# Patient Record
Sex: MALE | Race: BLACK OR AFRICAN AMERICAN | NOT HISPANIC OR LATINO | Employment: FULL TIME | URBAN - METROPOLITAN AREA
[De-identification: names, ages, dates, MRNs, and addresses within clinical notes are randomized per-mention and may not be internally consistent; named-entity substitution may affect disease eponyms.]

---

## 2017-01-31 DIAGNOSIS — G35 MS (MULTIPLE SCLEROSIS): ICD-10-CM

## 2017-02-02 RX ORDER — DIMETHYL FUMARATE 240 MG/1
CAPSULE ORAL
Qty: 60 CAPSULE | Refills: 1 | Status: CANCELLED | OUTPATIENT
Start: 2017-02-02

## 2017-02-04 ENCOUNTER — LAB VISIT (OUTPATIENT)
Dept: LAB | Facility: HOSPITAL | Age: 47
End: 2017-02-04
Attending: PSYCHIATRY & NEUROLOGY
Payer: COMMERCIAL

## 2017-02-04 DIAGNOSIS — G35 MULTIPLE SCLEROSIS: ICD-10-CM

## 2017-02-04 LAB
BASOPHILS # BLD AUTO: 0.03 K/UL
BASOPHILS NFR BLD: 1.2 %
DIFFERENTIAL METHOD: ABNORMAL
EOSINOPHIL # BLD AUTO: 0.2 K/UL
EOSINOPHIL NFR BLD: 5.8 %
ERYTHROCYTE [DISTWIDTH] IN BLOOD BY AUTOMATED COUNT: 12.5 %
HCT VFR BLD AUTO: 44.3 %
HGB BLD-MCNC: 14.6 G/DL
LYMPHOCYTES # BLD AUTO: 0.9 K/UL
LYMPHOCYTES NFR BLD: 33.6 %
MCH RBC QN AUTO: 29 PG
MCHC RBC AUTO-ENTMCNC: 33 %
MCV RBC AUTO: 88 FL
MONOCYTES # BLD AUTO: 0.4 K/UL
MONOCYTES NFR BLD: 15.1 %
NEUTROPHILS # BLD AUTO: 1.1 K/UL
NEUTROPHILS NFR BLD: 43.9 %
PLATELET # BLD AUTO: 265 K/UL
PMV BLD AUTO: 9.3 FL
RBC # BLD AUTO: 5.03 M/UL
WBC # BLD AUTO: 2.59 K/UL

## 2017-02-04 PROCEDURE — 36415 COLL VENOUS BLD VENIPUNCTURE: CPT

## 2017-02-04 PROCEDURE — 86360 T CELL ABSOLUTE COUNT/RATIO: CPT

## 2017-02-04 PROCEDURE — 86359 T CELLS TOTAL COUNT: CPT

## 2017-02-04 PROCEDURE — 85025 COMPLETE CBC W/AUTO DIFF WBC: CPT

## 2017-02-06 LAB
ABSOLUTE CD3: 501 CELLS/UL (ref 700–2100)
ABSOLUTE CD8: 129 CELLS/UL (ref 200–900)
CD3%: 56.6 % (ref 55–83)
CD3+CD4+ CELLS # BLD: 369 CELLS/UL (ref 300–1400)
CD3+CD4+ CELLS NFR BLD: 41.7 % (ref 28–57)
CD4/CD8 RATIO: 2.87 (ref 0.9–3.6)
CD8 % SUPPRESSOR T CELL: 14.6 % (ref 10–39)

## 2017-02-07 DIAGNOSIS — G35 MS (MULTIPLE SCLEROSIS): ICD-10-CM

## 2017-02-07 RX ORDER — DIMETHYL FUMARATE 240 MG/1
240 CAPSULE ORAL 2 TIMES DAILY
Qty: 180 CAPSULE | Refills: 0 | Status: SHIPPED | OUTPATIENT
Start: 2017-02-07 | End: 2017-05-02 | Stop reason: SDUPTHER

## 2017-02-07 NOTE — TELEPHONE ENCOUNTER
----- Message from Radha Silveira PA-C sent at 2/7/2017  8:49 AM CST -----  Tecfidera as DMT  CD8-129--was 120, 142  ALC-870--was 840, 790  Stable--continue to monitor q3

## 2017-02-10 DIAGNOSIS — G35 MS (MULTIPLE SCLEROSIS): ICD-10-CM

## 2017-02-10 RX ORDER — DIMETHYL FUMARATE 240 MG/1
CAPSULE ORAL
Qty: 60 CAPSULE | Refills: 1 | OUTPATIENT
Start: 2017-02-10

## 2017-03-03 ENCOUNTER — OFFICE VISIT (OUTPATIENT)
Dept: NEUROLOGY | Facility: CLINIC | Age: 47
End: 2017-03-03
Payer: COMMERCIAL

## 2017-03-03 VITALS
SYSTOLIC BLOOD PRESSURE: 113 MMHG | DIASTOLIC BLOOD PRESSURE: 74 MMHG | HEART RATE: 79 BPM | HEIGHT: 69 IN | WEIGHT: 150 LBS | BODY MASS INDEX: 22.22 KG/M2

## 2017-03-03 DIAGNOSIS — G35 MULTIPLE SCLEROSIS: Primary | ICD-10-CM

## 2017-03-03 DIAGNOSIS — R68.89 HEAT SENSITIVITY: ICD-10-CM

## 2017-03-03 DIAGNOSIS — R53.83 FATIGUE, UNSPECIFIED TYPE: ICD-10-CM

## 2017-03-03 DIAGNOSIS — Z71.89 COUNSELING REGARDING GOALS OF CARE: ICD-10-CM

## 2017-03-03 DIAGNOSIS — Z79.899 ENCOUNTER FOR LONG-TERM (CURRENT) USE OF HIGH-RISK MEDICATION: ICD-10-CM

## 2017-03-03 PROCEDURE — 1160F RVW MEDS BY RX/DR IN RCRD: CPT | Mod: S$GLB,,, | Performed by: PHYSICIAN ASSISTANT

## 2017-03-03 PROCEDURE — 99214 OFFICE O/P EST MOD 30 MIN: CPT | Mod: S$GLB,,, | Performed by: PHYSICIAN ASSISTANT

## 2017-03-03 PROCEDURE — 99999 PR PBB SHADOW E&M-EST. PATIENT-LVL III: CPT | Mod: PBBFAC,,, | Performed by: PHYSICIAN ASSISTANT

## 2017-03-03 NOTE — PROGRESS NOTES
Subjective:       Patient ID: Helio Nolasco is a 46 y.o. male who presents today for a routine clinic visit for MS.    MS HPI:  · DMT: Tecfidera  · Side effects from DMT? No  · Taking vitamin D3 as recommended? Yes -  Dose: 5,000 + IU Mon-Thu  · Feels great    SOCIAL HISTORY  Social History   Substance Use Topics    Smoking status: Current Some Day Smoker     Types: Cigars    Smokeless tobacco: None      Comment: smokes a cigar once a week    Alcohol use 0.0 oz/week     0 Standard drinks or equivalent per week      Comment: socially     Living arrangements - the patient lives with his family.  Employment [employer]--working well at night    MS ROS:  · Fatigue: takes B-12 and feels well  · Sleep Disturbance: No  · Bladder Dysfunction: No  · Bowel Dysfunction: No  · Spasticity: No--tends to get them more in August with the heat  · Visual Symptoms: Yes - just got new glasses  · Cognitive: No  · Mood Disorder: Yes - stable with Lexapro  · Gait Disturbance: No  · Falls: No  · Hand Dysfunction: No  · Pain: No  · Sexual Dysfunction: No  · Skin Breakdown: No  · Tremors: No  · Dysphagia:  No  · Dysarthria:  No  · Heat sensitivity:  Yes - as above  · Any un-met adaptive needs? No  · Copay Assist?  No--0$  · Clinical Trial candidate? No        Objective:        1. 25 foot timed walk:  Timed 25 Foot Walk: 9/7/2016 3/3/2017   Did patient wear an AFO? No No   Was assistive device used? No No   Time for 25 Foot Walk (seconds) 3.5 3.5   Time for 25 Foot Walk (seconds) 3.5 3.2       Neurologic Exam     Mental Status   Oriented to person, place, and time.   Follows 3 step commands.   Speech: speech is normal   Level of consciousness: alert  Normal comprehension.     Cranial Nerves     CN II   Visual acuity: normal with correction (20/20 OD; 20/25 OS with Snellen hand held chart at 6 ft)    CN III, IV, VI   Pupils are equal, round, and reactive to light.  Extraocular motions are normal.     CN V   Facial  sensation intact.     CN VII   Facial expression full, symmetric.     CN VIII   Hearing: intact (finger rub)    CN IX, X   Palate: symmetric    CN XI   CN XI normal.     CN XII   Tongue deviation: none    Motor Exam   Muscle bulk: normal  Overall muscle tone: normal    Strength   Right deltoid: 5/5  Left deltoid: 5/5  Right triceps: 5/5  Left triceps: 5/5  Right wrist extension: 5/5  Left wrist extension: 5/5  Right interossei: 5/5  Left interossei: 5/5  Right iliopsoas: 5/5  Left iliopsoas: 5/5  Right hamstrin/5  Left hamstrin/5  Right anterior tibial: 5/5  Left anterior tibial: 5/5  Right peroneal: 5/5  Left peroneal: 5/5    Sensory Exam   Light touch normal.   Right arm vibration: decreased from fingers  Left arm vibration: decreased from fingers  Right leg vibration: decreased from toes  Left leg vibration: decreased from toes    Gait, Coordination, and Reflexes     Gait  Gait: normal    Coordination   Finger to nose coordination: normal  Tandem walking coordination: normal    Tremor   Resting tremor: absent  Action tremor: absent    Reflexes   Right brachioradialis: 3+  Left brachioradialis: 3+  Right biceps: 3+  Left biceps: 3+  Right triceps: 3+  Left triceps: 3+  Right patellar: 3+  Left patellar: 3+  Right achilles: 3+  Left achilles: 3+  Left : 3+  Right plantar: normal  Left plantar: normal  Right ankle clonus: absent  Left ankle clonus: absent  Right pendular knee jerk: absent  Left pendular knee jerk: absent       Normal Heel/toe walk  Normal RSM         Imaging:   MRI unable to be done secondary to financial constraits    Labs:     Lab Results   Component Value Date    RKNAYBLR52CZ 82 11/15/2016    OMZRISKC46DB >96 (A) 2013    SUEMTFTO78FP 96 2013     No results found for: JCVINDEX, JCVANTIBODY  Lab Results   Component Value Date    GI7BWWDL 56.6 2017    ABSOLUTECD3 501 (L) 2017    QV0ZVUFV 14.6 2017    ABSOLUTECD8 129 (L) 2017    VZ7SYSCM 41.7 2017     ABSOLUTECD4 369 02/04/2017    LABCD48 2.87 02/04/2017     Lab Results   Component Value Date    WBC 2.59 (L) 02/04/2017    RBC 5.03 02/04/2017    HGB 14.6 02/04/2017    HCT 44.3 02/04/2017    MCV 88 02/04/2017    MCH 29.0 02/04/2017    MCHC 33.0 02/04/2017    RDW 12.5 02/04/2017     02/04/2017    MPV 9.3 02/04/2017    GRAN 1.1 (L) 02/04/2017    GRAN 43.9 02/04/2017    LYMPH 0.9 (L) 02/04/2017    LYMPH 33.6 02/04/2017    MONO 0.4 02/04/2017    MONO 15.1 (H) 02/04/2017    EOS 0.2 02/04/2017    BASO 0.03 02/04/2017    EOSINOPHIL 5.8 02/04/2017    BASOPHIL 1.2 02/04/2017     ALC-870  Diagnosis/Assessment/Plan:    1. Multiple Sclerosis  · Assessment:Patient is clinically stable on Tecfidera.   · Imaging:He has been unable to get MRI's done secondary to financial issues. He did not qualify for assistance through [employer]. I have advised him to seek assistance through [facility] Patient assistance fund---he states he will do this  · Disease Modifying Therapies: Continue Tecfidera and high dose Vit D(4 days a week). His labs show mild lymphopenia(Grade 1); however, CD8 count is stable. We will continue to monitor approximately every 3 months.     2. MS Symptom Assessment / Management  · Fatigue: Continue B-12 supplementation(5d/wk)-will check level with next Tecfidera labs  ·   Over 50% of this 30 minute visit was spent in direct face to face counseling of the patient regarding his current symptoms and management of same.  Follow up in 6 months with Dr. Junior  Patient agreed to POC today.    Attending, Dr. Junior, was available during today's encounter. Any change to plan along with cosign to appear in the EMR.     Radha Silveira PA-C  [facility]      Multiple sclerosis    Encounter for long-term (current) use of high-risk medication    Counseling regarding goals of care    Heat sensitivity    Fatigue, unspecified type

## 2017-03-07 ENCOUNTER — TELEPHONE (OUTPATIENT)
Dept: NEUROLOGY | Facility: CLINIC | Age: 47
End: 2017-03-07

## 2017-03-07 DIAGNOSIS — G35 MULTIPLE SCLEROSIS: Primary | ICD-10-CM

## 2017-03-07 NOTE — TELEPHONE ENCOUNTER
----- Message from Radha Silveira PA-C sent at 3/3/2017  2:34 PM CST -----  Lets continue to check labs every 3 months---next time please add B-12 to his labs  Thanks

## 2017-05-01 ENCOUNTER — LAB VISIT (OUTPATIENT)
Dept: LAB | Facility: HOSPITAL | Age: 47
End: 2017-05-01
Attending: PSYCHIATRY & NEUROLOGY
Payer: COMMERCIAL

## 2017-05-01 DIAGNOSIS — G35 MULTIPLE SCLEROSIS: ICD-10-CM

## 2017-05-01 LAB
ABSOLUTE CD3: 438 CELLS/UL (ref 700–2100)
ABSOLUTE CD8: 101 CELLS/UL (ref 200–900)
BASOPHILS # BLD AUTO: 0.02 K/UL
BASOPHILS NFR BLD: 0.9 %
CD3%: 55.7 % (ref 55–83)
CD3+CD4+ CELLS # BLD: 336 CELLS/UL (ref 300–1400)
CD3+CD4+ CELLS NFR BLD: 42.7 % (ref 28–57)
CD4/CD8 RATIO: 3.34 (ref 0.9–3.6)
CD8 % SUPPRESSOR T CELL: 12.8 % (ref 10–39)
DIFFERENTIAL METHOD: ABNORMAL
EOSINOPHIL # BLD AUTO: 0.1 K/UL
EOSINOPHIL NFR BLD: 2.6 %
ERYTHROCYTE [DISTWIDTH] IN BLOOD BY AUTOMATED COUNT: 12.2 %
HCT VFR BLD AUTO: 40.4 %
HGB BLD-MCNC: 13.9 G/DL
LYMPHOCYTES # BLD AUTO: 0.8 K/UL
LYMPHOCYTES NFR BLD: 32.6 %
MCH RBC QN AUTO: 29.8 PG
MCHC RBC AUTO-ENTMCNC: 34.4 %
MCV RBC AUTO: 87 FL
MONOCYTES # BLD AUTO: 0.2 K/UL
MONOCYTES NFR BLD: 10.4 %
NEUTROPHILS # BLD AUTO: 1.2 K/UL
NEUTROPHILS NFR BLD: 53.5 %
PLATELET # BLD AUTO: 208 K/UL
PMV BLD AUTO: 9.3 FL
RBC # BLD AUTO: 4.67 M/UL
VIT B12 SERPL-MCNC: 1653 PG/ML
WBC # BLD AUTO: 2.3 K/UL

## 2017-05-01 PROCEDURE — 85025 COMPLETE CBC W/AUTO DIFF WBC: CPT

## 2017-05-01 PROCEDURE — 86360 T CELL ABSOLUTE COUNT/RATIO: CPT

## 2017-05-01 PROCEDURE — 82607 VITAMIN B-12: CPT

## 2017-05-01 PROCEDURE — 86359 T CELLS TOTAL COUNT: CPT

## 2017-05-01 PROCEDURE — 36415 COLL VENOUS BLD VENIPUNCTURE: CPT

## 2017-05-02 DIAGNOSIS — G35 MS (MULTIPLE SCLEROSIS): ICD-10-CM

## 2017-05-03 ENCOUNTER — DOCUMENTATION ONLY (OUTPATIENT)
Dept: NEUROLOGY | Facility: CLINIC | Age: 47
End: 2017-05-03

## 2017-05-04 ENCOUNTER — TELEPHONE (OUTPATIENT)
Dept: NEUROLOGY | Facility: CLINIC | Age: 47
End: 2017-05-04

## 2017-05-04 DIAGNOSIS — G35 MULTIPLE SCLEROSIS: Primary | ICD-10-CM

## 2017-05-04 RX ORDER — DIMETHYL FUMARATE 240 MG/1
CAPSULE ORAL
Qty: 60 CAPSULE | Refills: 1 | Status: SHIPPED | OUTPATIENT
Start: 2017-05-04 | End: 2017-05-12 | Stop reason: SDUPTHER

## 2017-05-04 NOTE — TELEPHONE ENCOUNTER
----- Message from Radha Silveira PA-C sent at 5/4/2017 10:19 AM CDT -----  ALC-749  CD8-101  Would like to increase safety labs to monthly for the next 3 months

## 2017-05-04 NOTE — TELEPHONE ENCOUNTER
Pt aware of lab results. Informed patient we need monthly labs. Labs scheduled on 6/3, 7/1 and 7/29. [facility].

## 2017-05-11 ENCOUNTER — DOCUMENTATION ONLY (OUTPATIENT)
Dept: NEUROLOGY | Facility: CLINIC | Age: 47
End: 2017-05-11

## 2017-05-12 DIAGNOSIS — G35 MS (MULTIPLE SCLEROSIS): ICD-10-CM

## 2017-05-12 RX ORDER — DIMETHYL FUMARATE 240 MG/1
CAPSULE ORAL
Qty: 60 CAPSULE | Refills: 2 | Status: SHIPPED | OUTPATIENT
Start: 2017-05-12 | End: 2017-06-09 | Stop reason: SDUPTHER

## 2017-06-03 ENCOUNTER — LAB VISIT (OUTPATIENT)
Dept: LAB | Facility: HOSPITAL | Age: 47
End: 2017-06-03
Attending: PSYCHIATRY & NEUROLOGY
Payer: COMMERCIAL

## 2017-06-03 DIAGNOSIS — G35 MULTIPLE SCLEROSIS: ICD-10-CM

## 2017-06-03 LAB
BASOPHILS # BLD AUTO: 0.03 K/UL
BASOPHILS NFR BLD: 1.1 %
DIFFERENTIAL METHOD: ABNORMAL
EOSINOPHIL # BLD AUTO: 0.1 K/UL
EOSINOPHIL NFR BLD: 4 %
ERYTHROCYTE [DISTWIDTH] IN BLOOD BY AUTOMATED COUNT: 12.7 %
HCT VFR BLD AUTO: 44.7 %
HGB BLD-MCNC: 14.7 G/DL
LYMPHOCYTES # BLD AUTO: 0.9 K/UL
LYMPHOCYTES NFR BLD: 33.9 %
MCH RBC QN AUTO: 29.6 PG
MCHC RBC AUTO-ENTMCNC: 32.9 %
MCV RBC AUTO: 90 FL
MONOCYTES # BLD AUTO: 0.3 K/UL
MONOCYTES NFR BLD: 9.5 %
NEUTROPHILS # BLD AUTO: 1.4 K/UL
NEUTROPHILS NFR BLD: 51.5 %
PLATELET # BLD AUTO: 236 K/UL
PMV BLD AUTO: 9.5 FL
RBC # BLD AUTO: 4.97 M/UL
WBC # BLD AUTO: 2.74 K/UL

## 2017-06-03 PROCEDURE — 86359 T CELLS TOTAL COUNT: CPT

## 2017-06-03 PROCEDURE — 36415 COLL VENOUS BLD VENIPUNCTURE: CPT

## 2017-06-03 PROCEDURE — 86360 T CELL ABSOLUTE COUNT/RATIO: CPT

## 2017-06-03 PROCEDURE — 85025 COMPLETE CBC W/AUTO DIFF WBC: CPT

## 2017-06-05 LAB
ABSOLUTE CD3: 594 CELLS/UL (ref 700–2100)
ABSOLUTE CD8: 152 CELLS/UL (ref 200–900)
CD3%: 66 % (ref 55–83)
CD3+CD4+ CELLS # BLD: 442 CELLS/UL (ref 300–1400)
CD3+CD4+ CELLS NFR BLD: 49.1 % (ref 28–57)
CD4/CD8 RATIO: 2.9 (ref 0.9–3.6)
CD8 % SUPPRESSOR T CELL: 16.9 % (ref 10–39)

## 2017-06-07 ENCOUNTER — PATIENT MESSAGE (OUTPATIENT)
Dept: ORTHOPEDICS | Facility: CLINIC | Age: 47
End: 2017-06-07

## 2017-06-08 ENCOUNTER — PATIENT MESSAGE (OUTPATIENT)
Dept: NEUROLOGY | Facility: CLINIC | Age: 47
End: 2017-06-08

## 2017-06-09 DIAGNOSIS — G35 MS (MULTIPLE SCLEROSIS): ICD-10-CM

## 2017-06-09 RX ORDER — DIMETHYL FUMARATE 240 MG/1
240 CAPSULE ORAL 2 TIMES DAILY
Qty: 90 CAPSULE | Refills: 0 | Status: SHIPPED | OUTPATIENT
Start: 2017-06-09 | End: 2017-08-08 | Stop reason: SDUPTHER

## 2017-06-12 ENCOUNTER — TELEPHONE (OUTPATIENT)
Dept: NEUROLOGY | Facility: CLINIC | Age: 47
End: 2017-06-12

## 2017-06-12 NOTE — TELEPHONE ENCOUNTER
Spoke Eddie and clarified prescription should be for a qty of 180 capsules with zero refills. Eddie verbalized understanding.

## 2017-06-12 NOTE — TELEPHONE ENCOUNTER
----- Message from Karlee Linder sent at 6/12/2017 10:39 AM CDT -----  Contact: Jonathon/Heber Choudharyer is attempting to clarify the quanity for the following medication:    dimethyl fumarate (TECFIDERA) 240 mg CpDR    Please call [phone].    Reference # [id number]

## 2017-07-01 ENCOUNTER — LAB VISIT (OUTPATIENT)
Dept: LAB | Facility: HOSPITAL | Age: 47
End: 2017-07-01
Attending: PSYCHIATRY & NEUROLOGY
Payer: COMMERCIAL

## 2017-07-01 DIAGNOSIS — G35 MULTIPLE SCLEROSIS: ICD-10-CM

## 2017-07-01 LAB
BASOPHILS # BLD AUTO: 0.02 K/UL
BASOPHILS NFR BLD: 0.7 %
DIFFERENTIAL METHOD: ABNORMAL
EOSINOPHIL # BLD AUTO: 0.1 K/UL
EOSINOPHIL NFR BLD: 3.3 %
ERYTHROCYTE [DISTWIDTH] IN BLOOD BY AUTOMATED COUNT: 12.6 %
HCT VFR BLD AUTO: 45.1 %
HGB BLD-MCNC: 14.6 G/DL
LYMPHOCYTES # BLD AUTO: 0.8 K/UL
LYMPHOCYTES NFR BLD: 30.1 %
MCH RBC QN AUTO: 29 PG
MCHC RBC AUTO-ENTMCNC: 32.4 %
MCV RBC AUTO: 90 FL
MONOCYTES # BLD AUTO: 0.3 K/UL
MONOCYTES NFR BLD: 11.2 %
NEUTROPHILS # BLD AUTO: 1.5 K/UL
NEUTROPHILS NFR BLD: 54.7 %
PLATELET # BLD AUTO: 218 K/UL
PMV BLD AUTO: 9.7 FL
RBC # BLD AUTO: 5.03 M/UL
WBC # BLD AUTO: 2.76 K/UL

## 2017-07-01 PROCEDURE — 36415 COLL VENOUS BLD VENIPUNCTURE: CPT

## 2017-07-01 PROCEDURE — 86359 T CELLS TOTAL COUNT: CPT

## 2017-07-01 PROCEDURE — 85025 COMPLETE CBC W/AUTO DIFF WBC: CPT

## 2017-07-01 PROCEDURE — 86360 T CELL ABSOLUTE COUNT/RATIO: CPT

## 2017-07-03 ENCOUNTER — TELEPHONE (OUTPATIENT)
Dept: NEUROLOGY | Facility: CLINIC | Age: 47
End: 2017-07-03

## 2017-07-03 LAB
ABSOLUTE CD3: 501 CELLS/UL (ref 700–2100)
ABSOLUTE CD8: 130 CELLS/UL (ref 200–900)
CD3%: 58.3 % (ref 55–83)
CD3+CD4+ CELLS # BLD: 368 CELLS/UL (ref 300–1400)
CD3+CD4+ CELLS NFR BLD: 42.8 % (ref 28–57)
CD4/CD8 RATIO: 2.83 (ref 0.9–3.6)
CD8 % SUPPRESSOR T CELL: 15.1 % (ref 10–39)

## 2017-07-29 ENCOUNTER — LAB VISIT (OUTPATIENT)
Dept: LAB | Facility: HOSPITAL | Age: 47
End: 2017-07-29
Attending: PSYCHIATRY & NEUROLOGY
Payer: COMMERCIAL

## 2017-07-29 DIAGNOSIS — G35 MULTIPLE SCLEROSIS: ICD-10-CM

## 2017-07-29 LAB
BASOPHILS # BLD AUTO: 0.03 K/UL
BASOPHILS NFR BLD: 1.1 %
DIFFERENTIAL METHOD: ABNORMAL
EOSINOPHIL # BLD AUTO: 0.1 K/UL
EOSINOPHIL NFR BLD: 5.2 %
ERYTHROCYTE [DISTWIDTH] IN BLOOD BY AUTOMATED COUNT: 12.6 %
HCT VFR BLD AUTO: 43.1 %
HGB BLD-MCNC: 14.3 G/DL
LYMPHOCYTES # BLD AUTO: 0.9 K/UL
LYMPHOCYTES NFR BLD: 31.4 %
MCH RBC QN AUTO: 29.4 PG
MCHC RBC AUTO-ENTMCNC: 33.2 G/DL
MCV RBC AUTO: 89 FL
MONOCYTES # BLD AUTO: 0.3 K/UL
MONOCYTES NFR BLD: 12.2 %
NEUTROPHILS # BLD AUTO: 1.4 K/UL
NEUTROPHILS NFR BLD: 50.1 %
PLATELET # BLD AUTO: 240 K/UL
PMV BLD AUTO: 9.7 FL
RBC # BLD AUTO: 4.86 M/UL
WBC # BLD AUTO: 2.71 K/UL

## 2017-07-29 PROCEDURE — 86360 T CELL ABSOLUTE COUNT/RATIO: CPT

## 2017-07-29 PROCEDURE — 36415 COLL VENOUS BLD VENIPUNCTURE: CPT

## 2017-07-29 PROCEDURE — 86359 T CELLS TOTAL COUNT: CPT

## 2017-07-29 PROCEDURE — 85025 COMPLETE CBC W/AUTO DIFF WBC: CPT

## 2017-07-31 LAB
ABSOLUTE CD3: 562 CELLS/UL (ref 700–2100)
ABSOLUTE CD8: 144 CELLS/UL (ref 200–900)
CD3%: 59.1 % (ref 55–83)
CD3+CD4+ CELLS # BLD: 414 CELLS/UL (ref 300–1400)
CD3+CD4+ CELLS NFR BLD: 43.5 % (ref 28–57)
CD4/CD8 RATIO: 2.87 (ref 0.9–3.6)
CD8 % SUPPRESSOR T CELL: 15.2 % (ref 10–39)

## 2017-08-08 DIAGNOSIS — G35 MS (MULTIPLE SCLEROSIS): ICD-10-CM

## 2017-08-08 RX ORDER — DIMETHYL FUMARATE 240 MG/1
240 CAPSULE ORAL 2 TIMES DAILY
Qty: 60 CAPSULE | Refills: 2 | Status: SHIPPED | OUTPATIENT
Start: 2017-08-08 | End: 2017-08-18 | Stop reason: SDUPTHER

## 2017-08-08 RX ORDER — DIMETHYL FUMARATE 240 MG/1
CAPSULE ORAL
Qty: 60 CAPSULE | Refills: 1 | Status: SHIPPED | OUTPATIENT
Start: 2017-08-08 | End: 2017-08-08 | Stop reason: SDUPTHER

## 2017-08-18 DIAGNOSIS — G35 MS (MULTIPLE SCLEROSIS): ICD-10-CM

## 2017-08-18 RX ORDER — DIMETHYL FUMARATE 240 MG/1
CAPSULE ORAL
Qty: 60 CAPSULE | Refills: 1 | Status: SHIPPED | OUTPATIENT
Start: 2017-08-18 | End: 2017-10-04 | Stop reason: SDUPTHER

## 2017-09-07 ENCOUNTER — PATIENT MESSAGE (OUTPATIENT)
Dept: NEUROLOGY | Facility: CLINIC | Age: 47
End: 2017-09-07

## 2017-10-04 ENCOUNTER — OFFICE VISIT (OUTPATIENT)
Dept: NEUROLOGY | Facility: CLINIC | Age: 47
End: 2017-10-04
Payer: COMMERCIAL

## 2017-10-04 ENCOUNTER — LAB VISIT (OUTPATIENT)
Dept: LAB | Facility: HOSPITAL | Age: 47
End: 2017-10-04
Attending: PSYCHIATRY & NEUROLOGY
Payer: COMMERCIAL

## 2017-10-04 ENCOUNTER — TELEPHONE (OUTPATIENT)
Dept: NEUROLOGY | Facility: CLINIC | Age: 47
End: 2017-10-04

## 2017-10-04 VITALS
WEIGHT: 147.13 LBS | SYSTOLIC BLOOD PRESSURE: 129 MMHG | HEIGHT: 69 IN | DIASTOLIC BLOOD PRESSURE: 78 MMHG | HEART RATE: 84 BPM | BODY MASS INDEX: 21.79 KG/M2

## 2017-10-04 DIAGNOSIS — Z29.89 PROPHYLACTIC IMMUNOTHERAPY: ICD-10-CM

## 2017-10-04 DIAGNOSIS — G35 MS (MULTIPLE SCLEROSIS): ICD-10-CM

## 2017-10-04 DIAGNOSIS — Z79.899 ENCOUNTER FOR LONG-TERM (CURRENT) USE OF HIGH-RISK MEDICATION: ICD-10-CM

## 2017-10-04 DIAGNOSIS — G35 MS (MULTIPLE SCLEROSIS): Primary | ICD-10-CM

## 2017-10-04 DIAGNOSIS — Z71.89 COUNSELING REGARDING GOALS OF CARE: ICD-10-CM

## 2017-10-04 LAB
BASOPHILS # BLD AUTO: 0.02 K/UL
BASOPHILS NFR BLD: 0.4 %
DIFFERENTIAL METHOD: NORMAL
EOSINOPHIL # BLD AUTO: 0.1 K/UL
EOSINOPHIL NFR BLD: 1.1 %
ERYTHROCYTE [DISTWIDTH] IN BLOOD BY AUTOMATED COUNT: 12.6 %
HCT VFR BLD AUTO: 42.7 %
HGB BLD-MCNC: 14.3 G/DL
LYMPHOCYTES # BLD AUTO: 1.2 K/UL
LYMPHOCYTES NFR BLD: 25.5 %
MCH RBC QN AUTO: 29 PG
MCHC RBC AUTO-ENTMCNC: 33.5 G/DL
MCV RBC AUTO: 87 FL
MONOCYTES # BLD AUTO: 0.4 K/UL
MONOCYTES NFR BLD: 8.1 %
NEUTROPHILS # BLD AUTO: 3.1 K/UL
NEUTROPHILS NFR BLD: 64.7 %
PLATELET # BLD AUTO: 243 K/UL
PMV BLD AUTO: 9.3 FL
RBC # BLD AUTO: 4.93 M/UL
WBC # BLD AUTO: 4.71 K/UL

## 2017-10-04 PROCEDURE — 86359 T CELLS TOTAL COUNT: CPT

## 2017-10-04 PROCEDURE — 36415 COLL VENOUS BLD VENIPUNCTURE: CPT

## 2017-10-04 PROCEDURE — 99215 OFFICE O/P EST HI 40 MIN: CPT | Mod: S$GLB,,, | Performed by: PSYCHIATRY & NEUROLOGY

## 2017-10-04 PROCEDURE — 99999 PR PBB SHADOW E&M-EST. PATIENT-LVL II: CPT | Mod: PBBFAC,,, | Performed by: PSYCHIATRY & NEUROLOGY

## 2017-10-04 PROCEDURE — 85025 COMPLETE CBC W/AUTO DIFF WBC: CPT

## 2017-10-04 PROCEDURE — 86360 T CELL ABSOLUTE COUNT/RATIO: CPT

## 2017-10-04 NOTE — Clinical Note
FYI, if pt does not get MRI in next couple of months, then we'll have to change DMT; don't feel comfortable continuing Ty without MRI x 2 years;   I informed him today and ordered MRI

## 2017-10-04 NOTE — Clinical Note
ARTURO, pt concerned about cost of MRI; reluctant to pay deductible $500.  No MRI in 2 years; makes to much to qualify for MSAA. He gets copay assist from [employer] so I'm wondering if deductible has already been met.    I advised him to call insurance and ask :). In meantime I ordered MRI.  Nothing for you to do actually--just a head up;

## 2017-10-04 NOTE — PROGRESS NOTES
Subjective:       Patient ID: Helio Nolasco is a 46 y.o. male who presents today for a routine clinic visit for MS.    MS HPI:  · DMT: Tecfidera  · Side effects from DMT? No  · Taking vitamin D3 as recommended? Yes -  Dose: 5,000 IU daily  · Overall he is felling stable; no new neurologic symptoms;     SOCIAL HISTORY  Social History   Substance Use Topics    Smoking status: Current Some Day Smoker     Types: Cigars    Smokeless tobacco: Not on file      Comment: smokes a cigar once a week    Alcohol use 0.0 oz/week      Comment: socially       Employment [employer]--full time    MS ROS:  · Fatigue: No  · Sleep Disturbance: No  · Bladder Dysfunction: No  · Bowel Dysfunction: No  · Spasticity: No  · Visual Symptoms: No  · Cognitive: No  · Mood Disorder: Yes - on Lexapro; stable;  · Gait Disturbance: No  · Falls: No  · Hand Dysfunction: No  · Pain: No  · Sexual Dysfunction: No  · Skin Breakdown: No  · Tremors: No  · Dysphagia:  No  · Dysarthria:  No  · Heat sensitivity:  Yes - tries to limit outdoor exposure  · Any un-met adaptive needs? No  · Copay Assist?  Yes - $0  ·       Objective:       25 foot timed walk:  3.0 seconds without assist;   Timed 25 Foot Walk: 9/7/2016 3/3/2017   Did patient wear an AFO? No No   Was assistive device used? No No   Time for 25 Foot Walk (seconds) 3.5 3.5   Time for 25 Foot Walk (seconds) 3.5 3.2     Neurologic Exam    MENTAL STATUS:  Grossly intact.                                                                                                                             CRANIAL NERVE EXAM:  His extraocular muscles are intact.  Pupils are equal,  round and reactive to light.  No facial asymmetry.                                                                                                          MOTOR EXAM:  Shows some increased tone in the lower extremities and slow      rapid sequential movements in the right hand.                                                                                                                REFLEXES:  3+ and symmetric throughout.  Toes are silent.                                                                                                   SENSORY EXAM:  Shows no evidence of vibration loss.                                                                                                         COORDINATION:  Shows mild dystaxia on finger-to-nose.                                                                                                       GAIT:  Slightly wide based, but steady.        Imaging:     Results for orders placed during the hospital encounter of 04/06/15   MRI Brain W WO Contrast    Impression Findings consistent with patient's history of multiple sclerosis with multiple T2/flair signal abnormalities involving the supratentorial periventricular white matter and right cerebellar hemisphere.  The overall plaque burden is mild and largely stable   although there is a new lesion in the left frontal white matter.  There are no enhancing lesions to suggest active demyelination.       ______________________________________     Electronically signed by resident: Alejandro Hines  Date:     04/07/15  Time:    17:58          As the supervising and teaching physician, I personally reviewed the images and resident's interpretation and I agree with the findings.        Electronically signed by: REMBERTO LIU MD  Date:     04/07/15  Time:    18:03      Results for orders placed during the hospital encounter of 04/06/15   MRI Cervical Spine W WO Cont    Impression Stable findings in this patient with multiple sclerosis.  There are two discrete areas of abnormal cord signal located at C2-3 on the left and C6 posteriorly.  No new lesions are seen, and there is no evidence of enhancement to suggest an active plaque     Mild degenerative cervical spondylosis       Electronically signed by: REMBERTO LIU MD  Date:      04/07/15  Time:    13:33      Results for orders placed in visit on 10/03/11   MRI Thoracic Spine W WO Cont         Labs:       Lab Results   Component Value Date    TNVKWZOO68GV 82 11/15/2016    XZVZLYMY01PN >96 (A) 12/11/2013    RQHQEICA96HS 96 06/26/2013     No results found for: JCVINDEX, JCVANTIBODY  Lab Results   Component Value Date    QT5DULSO 62.5 10/04/2017    ABSOLUTECD3 697 (L) 10/04/2017    KC4JOQZS 16.0 10/04/2017    ABSOLUTECD8 178 (L) 10/04/2017    MG6GQINS 45.9 10/04/2017    ABSOLUTECD4 512 10/04/2017    LABCD48 2.88 10/04/2017     Lab Results   Component Value Date    WBC 4.71 10/04/2017    HGB 14.3 10/04/2017    HCT 42.7 10/04/2017    MCV 87 10/04/2017     10/04/2017     Sodium   Date Value Ref Range Status   09/28/2014 143 136 - 145 mmol/L Final     Potassium   Date Value Ref Range Status   09/28/2014 4.2 3.5 - 5.1 mmol/L Final     Chloride   Date Value Ref Range Status   09/28/2014 109 95 - 110 mmol/L Final     CO2   Date Value Ref Range Status   09/28/2014 25 23 - 29 mmol/L Final     Glucose   Date Value Ref Range Status   09/28/2014 100 70 - 110 mg/dL Final     BUN, Bld   Date Value Ref Range Status   09/28/2014 15 6 - 20 mg/dL Final     Creatinine   Date Value Ref Range Status   09/28/2014 0.9 0.5 - 1.4 mg/dL Final     Calcium   Date Value Ref Range Status   09/28/2014 9.5 8.7 - 10.5 mg/dL Final     Total Protein   Date Value Ref Range Status   06/13/2014 8.0 6.0 - 8.4 g/dL Final     Albumin   Date Value Ref Range Status   06/13/2014 4.2 3.5 - 5.2 g/dL Final     Total Bilirubin   Date Value Ref Range Status   06/13/2014 0.4 0.1 - 1.0 mg/dL Final     Comment:     For infants and newborns, interpretation of results should be based  on gestational age, weight and in agreement with clinical  observations.  Premature Infant recommended reference ranges:  Up to 24 hours.............<8.0 mg/dL  Up to 48 hours............<12.0 mg/dL  3-5 days..................<15.0 mg/dL  6-29  days.................<15.0 mg/dL       Alkaline Phosphatase   Date Value Ref Range Status   06/13/2014 74 55 - 135 U/L Final     AST   Date Value Ref Range Status   06/13/2014 28 10 - 40 U/L Final     ALT   Date Value Ref Range Status   06/13/2014 29 10 - 44 U/L Final     Anion Gap   Date Value Ref Range Status   09/28/2014 9 8 - 16 mmol/L Final     eGFR if    Date Value Ref Range Status   09/28/2014 >60 >60 mL/min/1.73 m^2 Final     eGFR if non    Date Value Ref Range Status   09/28/2014 >60 >60 mL/min/1.73 m^2 Final     Comment:     Calculation used to obtain the estimated glomerular filtration  rate (eGFR) is the CKD-EPI equation. Since race is unknown   in our information system, the eGFR values for   -American and Non--American patients are given   for each creatinine result.               Diagnosis/Assessment/Plan:    1. Multiple Sclerosis  · Assessment: Pt is clinically stable on Tecfidera;   · Imaging: MRI brain planned now; I explained to him that if he cannot get brain MRI (and we will do whatever we can to help him find financial support for this) we will have to change his DMT medication; Tecfidera carries remote risk of PML so periodic MRI is standard of care; pt expressed understanding  · Disease Modifying Therapies: continue Tecfidera for now; will check labs today to screen for lymphopenia;     2. MS Symptom Assessment / Management  · No other changes to regimen described in ROS above      Over 50% of this 40 minute visit was spent in direct face to face counseling of the patient about MS, DMT considerations, and MS symptom management.     MS (multiple sclerosis)  -     MRI Brain W WO Contrast; Future; Expected date: 10/04/2017  -     CBC auto differential; Future; Expected date: 10/04/2017  -     Bullard-Suppressor Ratio; Future; Expected date: 10/04/2017

## 2017-10-04 NOTE — TELEPHONE ENCOUNTER
----- Message from Carmenza Junior MD sent at 10/4/2017  3:14 PM CDT -----  FYI, if pt does not get MRI in next couple of months, then we'll have to change DMT; don't feel comfortable continuing Ty without MRI x 2 years;   I informed him today and ordered MRI

## 2017-10-05 LAB
ABSOLUTE CD3: 697 CELLS/UL (ref 700–2100)
ABSOLUTE CD8: 178 CELLS/UL (ref 200–900)
CD3%: 62.5 % (ref 55–83)
CD3+CD4+ CELLS # BLD: 512 CELLS/UL (ref 300–1400)
CD3+CD4+ CELLS NFR BLD: 45.9 % (ref 28–57)
CD4/CD8 RATIO: 2.88 (ref 0.9–3.6)
CD8 % SUPPRESSOR T CELL: 16 % (ref 10–39)

## 2017-10-05 RX ORDER — DIMETHYL FUMARATE 240 MG/1
CAPSULE ORAL
Qty: 60 CAPSULE | Refills: 3 | Status: SHIPPED | OUTPATIENT
Start: 2017-10-05 | End: 2017-10-14 | Stop reason: SDUPTHER

## 2017-10-12 ENCOUNTER — TELEPHONE (OUTPATIENT)
Dept: NEUROLOGY | Facility: CLINIC | Age: 47
End: 2017-10-12

## 2017-10-12 NOTE — TELEPHONE ENCOUNTER
----- Message from Carmenza Junior MD sent at 10/4/2017  3:13 PM CDT -----  FYI, pt concerned about cost of MRI; reluctant to pay deductible $500.  No MRI in 2 years; makes to much to qualify for MSAA. He gets copay assist from [employer] so I'm wondering if deductible has already been met.     I advised him to call insurance and ask :). In meantime I ordered MRI.  Nothing for you to do actually--just a head up;

## 2017-10-12 NOTE — TELEPHONE ENCOUNTER
Phoned pt to check in regarding MRI issues and any needs.  Left voicemail offering assistance and providing my contact information.

## 2017-10-14 DIAGNOSIS — G35 MS (MULTIPLE SCLEROSIS): ICD-10-CM

## 2017-10-17 RX ORDER — DIMETHYL FUMARATE 240 MG/1
CAPSULE ORAL
Qty: 60 CAPSULE | Refills: 1 | Status: SHIPPED | OUTPATIENT
Start: 2017-10-17 | End: 2017-12-13 | Stop reason: SDUPTHER

## 2017-11-02 ENCOUNTER — HOSPITAL ENCOUNTER (OUTPATIENT)
Dept: RADIOLOGY | Facility: HOSPITAL | Age: 47
Discharge: HOME | End: 2017-11-02
Attending: PSYCHIATRY & NEUROLOGY
Payer: COMMERCIAL

## 2017-11-02 DIAGNOSIS — G35 MS (MULTIPLE SCLEROSIS): ICD-10-CM

## 2017-11-02 PROCEDURE — 25500020 PHARM REV CODE 255: Performed by: PSYCHIATRY & NEUROLOGY

## 2017-11-02 PROCEDURE — 70553 MRI BRAIN STEM W/O & W/DYE: CPT | Mod: TC

## 2017-11-02 PROCEDURE — A9585 GADOBUTROL INJECTION: HCPCS | Performed by: PSYCHIATRY & NEUROLOGY

## 2017-11-02 PROCEDURE — 70553 MRI BRAIN STEM W/O & W/DYE: CPT | Mod: 26,,, | Performed by: RADIOLOGY

## 2017-11-02 RX ORDER — GADOBUTROL 604.72 MG/ML
7 INJECTION INTRAVENOUS
Status: COMPLETED | OUTPATIENT
Start: 2017-11-02 | End: 2017-11-02

## 2017-11-02 RX ADMIN — GADOBUTROL 7 ML: 604.72 INJECTION INTRAVENOUS at 08:11

## 2017-11-08 ENCOUNTER — DOCUMENTATION ONLY (OUTPATIENT)
Dept: NEUROLOGY | Facility: CLINIC | Age: 47
End: 2017-11-08

## 2017-12-13 DIAGNOSIS — G35 MS (MULTIPLE SCLEROSIS): ICD-10-CM

## 2017-12-13 RX ORDER — DIMETHYL FUMARATE 240 MG/1
CAPSULE ORAL
Qty: 60 CAPSULE | Refills: 3 | Status: SHIPPED | OUTPATIENT
Start: 2017-12-13 | End: 2018-04-10 | Stop reason: SDUPTHER

## 2017-12-15 ENCOUNTER — TELEPHONE (OUTPATIENT)
Dept: NEUROLOGY | Facility: CLINIC | Age: 47
End: 2017-12-15

## 2017-12-15 NOTE — TELEPHONE ENCOUNTER
----- Message from Luis Eduardo Herron sent at 12/15/2017  9:44 AM CST -----  Contact: Razia from [employer]  X_  1st Request  _  2nd Request  _  3rd Request        Who: Razia from [facility]    Why: Following up on fax sent over for patient's TECFIDERA 240 mg CpDR    What Number to Call Back: [phone]    When to Expect a call back: (Within 24 hours)    Please return the call at earliest convenience. Thanks!

## 2018-04-10 DIAGNOSIS — G35 MS (MULTIPLE SCLEROSIS): ICD-10-CM

## 2018-04-10 RX ORDER — DIMETHYL FUMARATE 240 MG/1
CAPSULE ORAL
Qty: 60 CAPSULE | Refills: 0 | Status: SHIPPED | OUTPATIENT
Start: 2018-04-10 | End: 2018-05-11 | Stop reason: DRUGHIGH

## 2018-04-10 NOTE — TELEPHONE ENCOUNTER
Pt has scheduled himself for 5/1/18 , no labs in the system to schedule, so I'm guessing labs will be done day of visit?

## 2018-05-01 ENCOUNTER — LAB VISIT (OUTPATIENT)
Dept: LAB | Facility: HOSPITAL | Age: 48
End: 2018-05-01
Attending: PSYCHIATRY & NEUROLOGY
Payer: COMMERCIAL

## 2018-05-01 ENCOUNTER — TELEPHONE (OUTPATIENT)
Dept: RESEARCH | Facility: HOSPITAL | Age: 48
End: 2018-05-01

## 2018-05-01 ENCOUNTER — OFFICE VISIT (OUTPATIENT)
Dept: NEUROLOGY | Facility: CLINIC | Age: 48
End: 2018-05-01
Payer: COMMERCIAL

## 2018-05-01 VITALS
HEART RATE: 58 BPM | SYSTOLIC BLOOD PRESSURE: 115 MMHG | HEIGHT: 69 IN | DIASTOLIC BLOOD PRESSURE: 75 MMHG | WEIGHT: 148 LBS | BODY MASS INDEX: 21.92 KG/M2

## 2018-05-01 DIAGNOSIS — R53.82 CHRONIC FATIGUE: ICD-10-CM

## 2018-05-01 DIAGNOSIS — G35 MULTIPLE SCLEROSIS: ICD-10-CM

## 2018-05-01 DIAGNOSIS — Z00.6 RESEARCH EXAM: Primary | ICD-10-CM

## 2018-05-01 DIAGNOSIS — Z00.6 RESEARCH EXAM: ICD-10-CM

## 2018-05-01 DIAGNOSIS — Z71.89 COUNSELING REGARDING GOALS OF CARE: ICD-10-CM

## 2018-05-01 DIAGNOSIS — G35 MULTIPLE SCLEROSIS: Primary | ICD-10-CM

## 2018-05-01 DIAGNOSIS — E55.9 VITAMIN D INSUFFICIENCY: ICD-10-CM

## 2018-05-01 DIAGNOSIS — Z79.899 ENCOUNTER FOR LONG-TERM (CURRENT) USE OF HIGH-RISK MEDICATION: ICD-10-CM

## 2018-05-01 LAB — RESEARCH LAB: NORMAL

## 2018-05-01 PROCEDURE — 99999 PR PBB SHADOW E&M-EST. PATIENT-LVL III: CPT | Mod: PBBFAC,,, | Performed by: PHYSICIAN ASSISTANT

## 2018-05-01 PROCEDURE — 99214 OFFICE O/P EST MOD 30 MIN: CPT | Mod: S$GLB,,, | Performed by: PHYSICIAN ASSISTANT

## 2018-05-01 NOTE — TELEPHONE ENCOUNTER
Control of Clement-Barr Virus in Multiple Sclerosis  PI: Carmenza Junior MD  Sub-I: Radha Silveira PA-C    IRB #: [id number]  IRB approval date: 11/9/2017    Helio Nolasco was approached about study today in clinic after routine visit with Radha Silveira PA-C.  He wishes to participate.       Informed Consent Process:  Present for discussion: Subject  Is LAR Consenting for Subject: no     Prior to the Informed Consent (IC) being signed, or any protocol required testing, procedure, or intervention being performed, the following was done or discussed:  · Purpose of the Study, Qualifications to Participate: yes  · Study Design, Schedule and Procedures: yes  · Risks, Benefits, Alternative Treatments, Compensation and Costs: yes  · Confidentiality and HIPAA Authorization for Release of Medical Records for the research trial/subject's right/study related injury: yes  · Study related contact information: yes  · Voluntary Participation and Withdrawal from the research trial at any time: yes  · Optional samples/procedures (if applicable): yes  · Patient has been offered the opportunity to ask questions regarding the study and all questions were answered satisfactorily: yes  · Patient verbalizes understanding of the study/procedures and agrees to participate: yes  · CRC and PI contact information given to patient: yes  · Signed copy given to patient: yes  · Copy in patient's chart and original uploaded to Epic: yes     Person Obtaining Consent: LUNA Marquez    Inclusion / Exclusion:     MS Participants (Arm A) Control Participants (Arm B)   Inclusion Criteria · Age 18 - 60  · Gender: male or female  · Meet the 2010 [place] Criteria for diagnosis of MS · Age 18 - 60  · Gender: male or female  · Scheduled lumbar puncture   Exclusion Criteria · Current or recent (within 2 weeks) use of oral antiplatelet or anticoagulant agents  · Pregnancy  · History of cognitive dysfunction from MS that might impair capacity to  consent  · Diagnosis of dementia, delirium or confusional state  · Suspected alcohol or drug toxicity acutely  · Suspected increased intracranial pressure  · HIV infection · Diagnosis of MS  · Current or recent (within 2 weeks) use of oral antiplatelet or anticoagulant agents  · Pregnancy  · Diagnosis of dementia, delirium or confusional state  · Suspected alcohol or drug toxicity acutely  · Suspected increased intracranial pressure  · HIV infection     The subject meets all of the inclusion criteria and none of the exclusion criteria for the MS participant group.    Verified by PI, Dr. Junior, on 1 MAY 2018    For MS participants only:  Date of MS diagnosis: 2006    Current DMT: Tecfidera    Study Procedures:   Blood Draw: yes  Performed by: [facility]  Date: 5/1/2018  Amount collected: 3 tubes    Lumbar Puncture: no      Collected sample(s) were immediately processed and frozen per protocol.  The samples will remain frozen in liquid nitrogen until shipment on dry ice to lab collaborators at [facility] in [place], [facility].    Compensation:  The subject was issued a [employer] and will receive $10 for his donation of blood.    Helio Nolasco was thanked for his participation and reminded to call CRC should he have any questions.

## 2018-05-01 NOTE — PROGRESS NOTES
Subjective:       Patient ID: Helio Nolasco is a 47 y.o. male who presents today for a routine clinic visit for MS.  Last seen October 2017 by Dr. Junior    MS HPI:  · DMT: Tecfidera  · Side effects from DMT? No  · Taking vitamin D3 as recommended? Yes - 5,000IU 4x/wk Dose:   · Feeling well    SOCIAL HISTORY  Social History   Substance Use Topics    Smoking status: Current Some Day Smoker     Types: Cigars    Smokeless tobacco: Not on file      Comment: smokes a cigar once a week    Alcohol use 0.0 oz/week      Comment: socially     Employment [employer]--full time    MS ROS:  · Clinical Trial candidate? Yes - EBV  · Fatigue: No--B12 help  · Sleep Disturbance: No  · Bladder Dysfunction: No  · Bowel Dysfunction: No  · Spasticity: No  · Visual Symptoms: No  · Cognitive: No  · Mood Disorder: Yes - on Lexapro; stable;  · Gait Disturbance: No  · Falls: No  · Hand Dysfunction: No  · Pain: No  · Sexual Dysfunction: No  · Skin Breakdown: No  · Tremors: No  · Dysphagia:  No  · Dysarthria:  No  · Heat sensitivity:  Yes - tries to limit outdoor exposure  · Any un-met adaptive needs? No  · Copay Assist?  Yes - $0        Objective:        1. 25 foot timed walk:3.4s today without assist  Timed 25 Foot Walk: 9/7/2016 3/3/2017   Did patient wear an AFO? No No   Was assistive device used? No No   Time for 25 Foot Walk (seconds) 3.5 3.5   Time for 25 Foot Walk (seconds) 3.5 3.2       Neurologic Exam     Mental Status   Oriented to person, place, and time.   Follows 3 step commands.   Speech: speech is normal   Level of consciousness: alert  Normal comprehension.     Cranial Nerves     CN II   Visual acuity: (20/30  OD; 20/20 OS with Snellen hand held chart at 6 ft)    CN III, IV, VI   Pupils are equal, round, and reactive to light.  Extraocular motions are normal.     CN V   Facial sensation intact.     CN VII   Facial expression full, symmetric.     CN VIII   Hearing: intact (finger rub)    CN IX, X   Palate:  symmetric    CN XI   CN XI normal.     CN XII   Tongue deviation: none    Motor Exam   Muscle bulk: normal  Overall muscle tone: normal    Strength   Right deltoid: 5/5  Left deltoid: 5/5  Right triceps: 5/5  Left triceps: 5/5  Right wrist extension: 5/5  Left wrist extension: 5/5  Right interossei: 5/5  Left interossei: 5/5  Right iliopsoas: 5/5  Left iliopsoas: 5/5  Right hamstrin/5  Left hamstrin/5  Right anterior tibial: 5/5  Left anterior tibial: 5/5  Right peroneal: 5/5  Left peroneal: 5/5    Sensory Exam   Light touch normal.   Right arm vibration: normal  Left arm vibration: normal  Right leg vibration: decreased from toes  Left leg vibration: decreased from toes    Gait, Coordination, and Reflexes     Gait  Gait: normal    Coordination   Finger to nose coordination: normal  Tandem walking coordination: normal (no loss of balance, but slightly unsteady)    Tremor   Resting tremor: absent  Action tremor: absent    Reflexes   Right brachioradialis: 3+  Left brachioradialis: 3+  Right biceps: 3+  Left biceps: 3+  Right triceps: 3+  Left triceps: 3+  Right patellar: 3+  Left patellar: 3+  Right achilles: 3+  Left achilles: 3+  Right plantar: normal  Left plantar: normal  Right ankle clonus: absent  Left ankle clonus: absent  Right pendular knee jerk: absent  Left pendular knee jerk: absentNormal Heel/toe walk  Normal RSM         Imaging:     Results for orders placed during the hospital encounter of 17   MRI Brain W WO Contrast    Impression No significant change from prior. Continued few scattered foci of T2 flair signal hyperintense lesions supratentorial parenchyma remains most compatible with mild degree of prior demyelinating plaque burden.    There is no definite new lesion or enhancing lesion to suggest interval or active demyelination.     Clinical correlation and continued followup advised      Electronically signed by: JOSÉ CARSON DO  Date:     17  Time:    09:06      Results for  orders placed during the hospital encounter of 04/06/15   MRI Cervical Spine W WO Cont    Impression Stable findings in this patient with multiple sclerosis.  There are two discrete areas of abnormal cord signal located at C2-3 on the left and C6 posteriorly.  No new lesions are seen, and there is no evidence of enhancement to suggest an active plaque     Mild degenerative cervical spondylosis       Electronically signed by: REMBERTO LIU MD  Date:     04/07/15  Time:    13:33      Results for orders placed in visit on 10/03/11   MRI Thoracic Spine W WO Cont         Labs:     Lab Results   Component Value Date    DGBSIIIJ68UQ 51 05/01/2018    OVYKAIZI76SY 82 11/15/2016    GZUFOXUQ42SR >96 (A) 12/11/2013     No results found for: JCVINDEX, JCVANTIBODY  Lab Results   Component Value Date    KL7GWPVF 62.5 10/04/2017    ABSOLUTECD3 697 (L) 10/04/2017    MW6PYWJA 16.0 10/04/2017    ABSOLUTECD8 178 (L) 10/04/2017    KJ2NYCOW 45.9 10/04/2017    ABSOLUTECD4 512 10/04/2017    LABCD48 2.88 10/04/2017     Lab Results   Component Value Date    WBC 2.30 (L) 05/01/2018    RBC 4.76 05/01/2018    HGB 14.1 05/01/2018    HCT 43.1 05/01/2018    MCV 91 05/01/2018    MCH 29.6 05/01/2018    MCHC 32.7 05/01/2018    RDW 12.5 05/01/2018     05/01/2018    MPV 9.3 05/01/2018    GRAN 1.2 (L) 05/01/2018    GRAN 51.0 05/01/2018    LYMPH 0.8 (L) 05/01/2018    LYMPH 32.6 05/01/2018    MONO 0.2 (L) 05/01/2018    MONO 10.4 05/01/2018    EOS 0.1 05/01/2018    BASO 0.03 05/01/2018    EOSINOPHIL 4.3 05/01/2018    BASOPHIL 1.3 05/01/2018     Sodium   Date Value Ref Range Status   09/28/2014 143 136 - 145 mmol/L Final     Potassium   Date Value Ref Range Status   09/28/2014 4.2 3.5 - 5.1 mmol/L Final     Chloride   Date Value Ref Range Status   09/28/2014 109 95 - 110 mmol/L Final     CO2   Date Value Ref Range Status   09/28/2014 25 23 - 29 mmol/L Final     Glucose   Date Value Ref Range Status   09/28/2014 100 70 - 110 mg/dL Final     BUN, Bld    Date Value Ref Range Status   09/28/2014 15 6 - 20 mg/dL Final     Creatinine   Date Value Ref Range Status   09/28/2014 0.9 0.5 - 1.4 mg/dL Final     Calcium   Date Value Ref Range Status   09/28/2014 9.5 8.7 - 10.5 mg/dL Final     Total Protein   Date Value Ref Range Status   06/13/2014 8.0 6.0 - 8.4 g/dL Final     Albumin   Date Value Ref Range Status   06/13/2014 4.2 3.5 - 5.2 g/dL Final     Total Bilirubin   Date Value Ref Range Status   06/13/2014 0.4 0.1 - 1.0 mg/dL Final     Comment:     For infants and newborns, interpretation of results should be based  on gestational age, weight and in agreement with clinical  observations.  Premature Infant recommended reference ranges:  Up to 24 hours.............<8.0 mg/dL  Up to 48 hours............<12.0 mg/dL  3-5 days..................<15.0 mg/dL  6-29 days.................<15.0 mg/dL       Alkaline Phosphatase   Date Value Ref Range Status   06/13/2014 74 55 - 135 U/L Final     AST   Date Value Ref Range Status   06/13/2014 28 10 - 40 U/L Final     ALT   Date Value Ref Range Status   06/13/2014 29 10 - 44 U/L Final     Anion Gap   Date Value Ref Range Status   09/28/2014 9 8 - 16 mmol/L Final     eGFR if    Date Value Ref Range Status   09/28/2014 >60 >60 mL/min/1.73 m^2 Final     eGFR if non    Date Value Ref Range Status   09/28/2014 >60 >60 mL/min/1.73 m^2 Final     Comment:     Calculation used to obtain the estimated glomerular filtration  rate (eGFR) is the CKD-EPI equation. Since race is unknown   in our information system, the eGFR values for   -American and Non--American patients are given   for each creatinine result.         Diagnosis/Assessment/Plan:    1. Multiple Sclerosis  · Assessment: Patient is clinically stable on Tecfidera  · Imaging: MRI done fall of 2017 and stable --MRI are cost prohibitive for patient but he understands with PML risk that they are necessary--requests every other year.    · Disease Modifying Therapies: Continue Tecfidera and high dose Vit D3. Will check safety labs today and then refill Tecfidera if acceptable.  Will check Vit D level today as well.     2. MS Symptom Assessment / Management  · Clinical Trial: Patient has agreed to participate in EBV clinical trial and has met with [facility]- Stacy Morrell for consent and serum collection.        Over 50% of this 30 minute visit was spent in direct face to face counseling of the patient about MS, DMT considerations, and MS symptom management.   Follow-up in about 6 months (around 11/1/2018). with me  Patient agreed to POC today.    Attending, Dr. Junior, was available during today's encounter. Any change to plan along with cosign to appear in the EMR.     Radha Silveira PA-C  [facility]        Multiple sclerosis  -     CBC auto differential; Future; Expected date: 05/01/2018  -     Winthrop-Suppressor Ratio; Future; Expected date: 05/01/2018  -     Vitamin D; Future    Counseling regarding goals of care    Encounter for long-term (current) use of high-risk medication  -     CBC auto differential; Future; Expected date: 05/01/2018  -     Winthrop-Suppressor Ratio; Future; Expected date: 05/01/2018    Vitamin D insufficiency  -     Vitamin D; Future    Chronic fatigue

## 2018-05-09 ENCOUNTER — PATIENT MESSAGE (OUTPATIENT)
Dept: NEUROLOGY | Facility: CLINIC | Age: 48
End: 2018-05-09

## 2018-05-11 DIAGNOSIS — G35 MULTIPLE SCLEROSIS: Primary | ICD-10-CM

## 2018-05-11 RX ORDER — DIMETHYL FUMARATE 120 MG/1
120 CAPSULE ORAL 2 TIMES DAILY
Qty: 180 CAPSULE | Refills: 0 | Status: SHIPPED | OUTPATIENT
Start: 2018-05-11 | End: 2018-08-07 | Stop reason: SDUPTHER

## 2018-05-15 DIAGNOSIS — G35 MS (MULTIPLE SCLEROSIS): ICD-10-CM

## 2018-05-21 ENCOUNTER — PATIENT MESSAGE (OUTPATIENT)
Dept: NEUROLOGY | Facility: CLINIC | Age: 48
End: 2018-05-21

## 2018-05-21 RX ORDER — DIMETHYL FUMARATE 240 MG/1
CAPSULE ORAL
Qty: 60 CAPSULE | Refills: 0 | OUTPATIENT
Start: 2018-05-21

## 2018-05-25 DIAGNOSIS — G35 MS (MULTIPLE SCLEROSIS): ICD-10-CM

## 2018-05-28 RX ORDER — DIMETHYL FUMARATE 240 MG/1
CAPSULE ORAL
Qty: 60 CAPSULE | Refills: 0 | OUTPATIENT
Start: 2018-05-28

## 2018-08-07 DIAGNOSIS — G35 MULTIPLE SCLEROSIS: ICD-10-CM

## 2018-08-07 DIAGNOSIS — Z79.899 ENCOUNTER FOR LONG-TERM (CURRENT) USE OF HIGH-RISK MEDICATION: ICD-10-CM

## 2018-08-07 DIAGNOSIS — Z79.899 ENCOUNTER FOR EYE EXAM DUE TO HIGH RISK MEDICATION: Primary | ICD-10-CM

## 2018-08-09 DIAGNOSIS — G35 MULTIPLE SCLEROSIS: Primary | ICD-10-CM

## 2018-08-11 ENCOUNTER — LAB VISIT (OUTPATIENT)
Dept: LAB | Facility: HOSPITAL | Age: 48
End: 2018-08-11
Attending: FAMILY MEDICINE
Payer: COMMERCIAL

## 2018-08-11 DIAGNOSIS — Z79.899 ENCOUNTER FOR LONG-TERM (CURRENT) USE OF HIGH-RISK MEDICATION: ICD-10-CM

## 2018-08-11 DIAGNOSIS — G35 MULTIPLE SCLEROSIS: ICD-10-CM

## 2018-08-11 LAB
ALBUMIN SERPL BCP-MCNC: 3.9 G/DL
ALP SERPL-CCNC: 63 U/L
ALT SERPL W/O P-5'-P-CCNC: 24 U/L
AST SERPL-CCNC: 19 U/L
BASOPHILS # BLD AUTO: 0.03 K/UL
BASOPHILS NFR BLD: 1.1 %
BILIRUB DIRECT SERPL-MCNC: 0.2 MG/DL
BILIRUB SERPL-MCNC: 0.5 MG/DL
DIFFERENTIAL METHOD: ABNORMAL
EOSINOPHIL # BLD AUTO: 0.2 K/UL
EOSINOPHIL NFR BLD: 7 %
ERYTHROCYTE [DISTWIDTH] IN BLOOD BY AUTOMATED COUNT: 12.2 %
HCT VFR BLD AUTO: 44 %
HGB BLD-MCNC: 14.6 G/DL
LYMPHOCYTES # BLD AUTO: 1 K/UL
LYMPHOCYTES NFR BLD: 34.9 %
MCH RBC QN AUTO: 29.7 PG
MCHC RBC AUTO-ENTMCNC: 33.2 G/DL
MCV RBC AUTO: 90 FL
MONOCYTES # BLD AUTO: 0.3 K/UL
MONOCYTES NFR BLD: 9.9 %
NEUTROPHILS # BLD AUTO: 1.3 K/UL
NEUTROPHILS NFR BLD: 46.7 %
PLATELET # BLD AUTO: 252 K/UL
PMV BLD AUTO: 9.4 FL
PROT SERPL-MCNC: 6.8 G/DL
RBC # BLD AUTO: 4.91 M/UL
WBC # BLD AUTO: 2.84 K/UL

## 2018-08-11 PROCEDURE — 36415 COLL VENOUS BLD VENIPUNCTURE: CPT

## 2018-08-11 PROCEDURE — 86359 T CELLS TOTAL COUNT: CPT

## 2018-08-11 PROCEDURE — 86360 T CELL ABSOLUTE COUNT/RATIO: CPT

## 2018-08-11 PROCEDURE — 85025 COMPLETE CBC W/AUTO DIFF WBC: CPT

## 2018-08-11 PROCEDURE — 80076 HEPATIC FUNCTION PANEL: CPT

## 2018-08-13 LAB
ABSOLUTE CD3: 663 CELLS/UL (ref 700–2100)
ABSOLUTE CD8: 152 CELLS/UL (ref 200–900)
CD3%: 63.2 % (ref 55–83)
CD3+CD4+ CELLS # BLD: 501 CELLS/UL (ref 300–1400)
CD3+CD4+ CELLS NFR BLD: 47.7 % (ref 28–57)
CD4/CD8 RATIO: 3.31 (ref 0.9–3.6)
CD8 % SUPPRESSOR T CELL: 14.4 % (ref 10–39)

## 2018-09-07 ENCOUNTER — PATIENT MESSAGE (OUTPATIENT)
Dept: NEUROLOGY | Facility: CLINIC | Age: 48
End: 2018-09-07

## 2018-09-07 RX ORDER — DIMETHYL FUMARATE 120 MG/1
CAPSULE ORAL
Qty: 180 CAPSULE | Refills: 1 | Status: SHIPPED | OUTPATIENT
Start: 2018-09-07 | End: 2019-01-29 | Stop reason: SDUPTHER

## 2019-01-08 ENCOUNTER — PATIENT MESSAGE (OUTPATIENT)
Dept: NEUROLOGY | Facility: CLINIC | Age: 49
End: 2019-01-08

## 2019-01-08 DIAGNOSIS — G35 MULTIPLE SCLEROSIS: Primary | ICD-10-CM

## 2019-01-08 DIAGNOSIS — Z79.899 HIGH RISK MEDICATION USE: ICD-10-CM

## 2019-01-19 ENCOUNTER — LAB VISIT (OUTPATIENT)
Dept: LAB | Facility: HOSPITAL | Age: 49
End: 2019-01-19
Payer: COMMERCIAL

## 2019-01-19 DIAGNOSIS — G35 MULTIPLE SCLEROSIS: ICD-10-CM

## 2019-01-19 DIAGNOSIS — Z79.899 HIGH RISK MEDICATION USE: ICD-10-CM

## 2019-01-19 LAB
ALBUMIN SERPL BCP-MCNC: 3.8 G/DL
ALP SERPL-CCNC: 62 U/L
ALT SERPL W/O P-5'-P-CCNC: 25 U/L
AST SERPL-CCNC: 22 U/L
BASOPHILS # BLD AUTO: 0.02 K/UL
BASOPHILS NFR BLD: 0.6 %
BILIRUB DIRECT SERPL-MCNC: 0.2 MG/DL
BILIRUB SERPL-MCNC: 0.4 MG/DL
DIFFERENTIAL METHOD: ABNORMAL
EOSINOPHIL # BLD AUTO: 0.2 K/UL
EOSINOPHIL NFR BLD: 5.7 %
ERYTHROCYTE [DISTWIDTH] IN BLOOD BY AUTOMATED COUNT: 12.6 %
HCT VFR BLD AUTO: 44 %
HGB BLD-MCNC: 13.9 G/DL
LYMPHOCYTES # BLD AUTO: 0.9 K/UL
LYMPHOCYTES NFR BLD: 27.1 %
MCH RBC QN AUTO: 28.8 PG
MCHC RBC AUTO-ENTMCNC: 31.6 G/DL
MCV RBC AUTO: 91 FL
MONOCYTES # BLD AUTO: 0.4 K/UL
MONOCYTES NFR BLD: 10.5 %
NEUTROPHILS # BLD AUTO: 1.9 K/UL
NEUTROPHILS NFR BLD: 56.1 %
PLATELET # BLD AUTO: 245 K/UL
PMV BLD AUTO: 9.4 FL
PROT SERPL-MCNC: 6.7 G/DL
RBC # BLD AUTO: 4.83 M/UL
WBC # BLD AUTO: 3.32 K/UL

## 2019-01-19 PROCEDURE — 85025 COMPLETE CBC W/AUTO DIFF WBC: CPT

## 2019-01-19 PROCEDURE — 80076 HEPATIC FUNCTION PANEL: CPT

## 2019-01-19 PROCEDURE — 36415 COLL VENOUS BLD VENIPUNCTURE: CPT

## 2019-01-21 ENCOUNTER — LAB VISIT (OUTPATIENT)
Dept: LAB | Facility: HOSPITAL | Age: 49
End: 2019-01-21
Attending: PHYSICIAN ASSISTANT
Payer: COMMERCIAL

## 2019-01-21 DIAGNOSIS — Z79.899 HIGH RISK MEDICATION USE: ICD-10-CM

## 2019-01-21 DIAGNOSIS — G35 MULTIPLE SCLEROSIS: ICD-10-CM

## 2019-01-21 PROCEDURE — 86356 MONONUCLEAR CELL ANTIGEN: CPT

## 2019-01-21 PROCEDURE — 36415 COLL VENOUS BLD VENIPUNCTURE: CPT

## 2019-01-22 ENCOUNTER — TELEPHONE (OUTPATIENT)
Dept: NEUROLOGY | Facility: CLINIC | Age: 49
End: 2019-01-22

## 2019-01-22 DIAGNOSIS — G35 MULTIPLE SCLEROSIS: ICD-10-CM

## 2019-01-22 DIAGNOSIS — Z79.899 ENCOUNTER FOR LONG-TERM (CURRENT) USE OF HIGH-RISK MEDICATION: ICD-10-CM

## 2019-01-22 DIAGNOSIS — G35 MULTIPLE SCLEROSIS: Primary | ICD-10-CM

## 2019-01-22 NOTE — TELEPHONE ENCOUNTER
----- Message from Radha Silveira PA-C sent at 1/22/2019 10:52 AM CST -----  Tecfidera-120mg dosing BID  LFT's normal  CBC-WBC-3.32, ALC-899.72

## 2019-01-25 LAB — CD20 CELLS NFR SPEC: NORMAL %

## 2019-01-26 ENCOUNTER — LAB VISIT (OUTPATIENT)
Dept: LAB | Facility: HOSPITAL | Age: 49
End: 2019-01-26
Payer: COMMERCIAL

## 2019-01-26 DIAGNOSIS — Z79.899 ENCOUNTER FOR LONG-TERM (CURRENT) USE OF HIGH-RISK MEDICATION: ICD-10-CM

## 2019-01-26 DIAGNOSIS — G35 MULTIPLE SCLEROSIS: ICD-10-CM

## 2019-01-26 PROCEDURE — 86360 T CELL ABSOLUTE COUNT/RATIO: CPT

## 2019-01-26 PROCEDURE — 86359 T CELLS TOTAL COUNT: CPT

## 2019-01-28 LAB
ABSOLUTE CD3: 600 CELLS/UL (ref 700–2100)
ABSOLUTE CD8: 142 CELLS/UL (ref 200–900)
CD3%: 60.8 % (ref 55–83)
CD3+CD4+ CELLS # BLD: 450 CELLS/UL (ref 300–1400)
CD3+CD4+ CELLS NFR BLD: 45.6 % (ref 28–57)
CD4/CD8 RATIO: 3.18 (ref 0.9–3.6)
CD8 % SUPPRESSOR T CELL: 14.4 % (ref 10–39)

## 2019-01-29 RX ORDER — DIMETHYL FUMARATE 120 MG/1
1 CAPSULE ORAL 2 TIMES DAILY
Qty: 180 CAPSULE | Refills: 1 | Status: SHIPPED | OUTPATIENT
Start: 2019-01-29 | End: 2019-06-26 | Stop reason: SDUPTHER

## 2019-02-19 ENCOUNTER — PATIENT MESSAGE (OUTPATIENT)
Dept: NEUROLOGY | Facility: CLINIC | Age: 49
End: 2019-02-19

## 2019-05-07 ENCOUNTER — OFFICE VISIT (OUTPATIENT)
Dept: NEUROLOGY | Facility: CLINIC | Age: 49
End: 2019-05-07
Payer: COMMERCIAL

## 2019-05-07 VITALS
HEIGHT: 69 IN | SYSTOLIC BLOOD PRESSURE: 114 MMHG | BODY MASS INDEX: 23.22 KG/M2 | HEART RATE: 53 BPM | WEIGHT: 156.75 LBS | DIASTOLIC BLOOD PRESSURE: 77 MMHG

## 2019-05-07 DIAGNOSIS — Z29.89 PROPHYLACTIC IMMUNOTHERAPY: ICD-10-CM

## 2019-05-07 DIAGNOSIS — Z79.899 HIGH RISK MEDICATION USE: ICD-10-CM

## 2019-05-07 DIAGNOSIS — Z71.89 COUNSELING REGARDING GOALS OF CARE: ICD-10-CM

## 2019-05-07 DIAGNOSIS — G35 MULTIPLE SCLEROSIS: Primary | ICD-10-CM

## 2019-05-07 DIAGNOSIS — R68.89 HEAT SENSITIVITY: ICD-10-CM

## 2019-05-07 PROCEDURE — 99999 PR PBB SHADOW E&M-EST. PATIENT-LVL III: CPT | Mod: PBBFAC,,, | Performed by: CLINICAL NURSE SPECIALIST

## 2019-05-07 PROCEDURE — 99214 PR OFFICE/OUTPT VISIT, EST, LEVL IV, 30-39 MIN: ICD-10-PCS | Mod: S$GLB,,, | Performed by: CLINICAL NURSE SPECIALIST

## 2019-05-07 PROCEDURE — 99214 OFFICE O/P EST MOD 30 MIN: CPT | Mod: S$GLB,,, | Performed by: CLINICAL NURSE SPECIALIST

## 2019-05-07 PROCEDURE — 99999 PR PBB SHADOW E&M-EST. PATIENT-LVL III: ICD-10-PCS | Mod: PBBFAC,,, | Performed by: CLINICAL NURSE SPECIALIST

## 2019-05-07 RX ORDER — LATANOPROST 50 UG/ML
1 SOLUTION/ DROPS OPHTHALMIC NIGHTLY
COMMUNITY

## 2019-05-07 NOTE — PROGRESS NOTES
"Subjective:       Patient ID: Helio Nolasco is a 48 y.o. male who presents today for a routine clinic visit for MS.  The history has been provided by the patient. He was last seen by DEVIN Espinoza in May 2018.     MS HPI:  · DMT: Tecfidera   · Side effects from DMT? No  · Taking vitamin D3 as recommended? Yes -  Dose: 5000 units 4 times a week   · He denies any significant new or different symptoms.   · He has seen his eye doctor recently and has been prescribed latanaprost drops once daily--his eye pressures were elevated.   · He does not do any formal exercise, but he has a very active job.     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     MS ROS:  · Fatigue: No-Energy level has been good, but his fatigue gets worse when it is hot.   · Sleep Disturbance: No  · Bladder Dysfunction: He has some urgency, so he tries to limit his caffeine intake, which helps. He denies any UTIs.   · Bowel Dysfunction: No  · Spasticity: None recently--he has some cramping and stiffness in the hands when it is hot outside  · Visual Symptoms: As above; he has a new prescription for his glasses--has bifocals now  · Cognitive: Nothing major; might forget why he walks into a room   · Mood Disorder: Yes - Mood has been "real good; " takes Lexapro  · Gait Disturbance: No issues lately; when he gets fatigue, he may feel off balance  · Falls: No  · Hand Dysfunction: Hands feel a bit tight lately--mostly at the end of the day  · Pain: No; occasional aches and pains   · Sexual Dysfunction: " No  · Skin Breakdown: No  · Tremors: Comes and goes; very intermittent   · Dysphagia:  No  · Dysarthria:  No  · Heat sensitivity:  Yes - tries to limit outdoor exposure; he uses cooling towels as needed   · Any un-met adaptive needs? No  · Copay Assist?  Yes - $0        Objective:        1. 25 foot timed walk: 3.5 seconds today without assist; was 3.4 seconds at last visit without assist   Timed 25 Foot Walk: 2016 3/3/2017   Did patient wear an AFO? No No   Was assistive device used? No No   Time for 25 Foot Walk (seconds) 3.5 3.5   Time for 25 Foot Walk (seconds) 3.5 3.2       Neurologic Exam     Mental Status   Oriented to person, place, and time.   Follows 3 step commands.   Attention: normal. Concentration: normal.   Speech: speech is normal   Level of consciousness: alert  Normal comprehension.     Cranial Nerves     CN III, IV, VI   Pupils are equal, round, and reactive to light.  Extraocular motions are normal.     CN V   Facial sensation intact.     CN VII   Facial expression full, symmetric.     CN VIII   Hearing: intact (finger rub)    CN IX, X   Palate: symmetric    CN XI   CN XI normal.     CN XII   Tongue deviation: none    Motor Exam   Muscle bulk: normal  Overall muscle tone: normal    Strength   Right neck flexion: 5/5  Left neck flexion: 5/5  Right neck extension: 5/5  Left neck extension: 5/5  Right deltoid: 5/5  Left deltoid: 5/5  Right biceps: 5/5  Left biceps: 5/5  Right triceps: 5/5  Left triceps: 5/5  Right wrist flexion: 5/5  Left wrist flexion: 5/5  Right wrist extension: 5/5  Left wrist extension: 5/5  Right interossei: 5/5  Left interossei: 5/5  Right iliopsoas: 5/5  Left iliopsoas: 5/5  Right quadriceps: 5/5  Left quadriceps: 5/5  Right hamstrin/5  Left hamstrin/5  Right anterior tibial: 5/5  Left anterior tibial: 5/5  Right peroneal: 5/5  Left peroneal: 5/5    Sensory Exam   Light touch normal.   Right arm vibration: normal  Left arm vibration: normal  Right leg vibration:  decreased from toes  Left leg vibration: decreased from toes    Gait, Coordination, and Reflexes     Gait  Gait: normal    Coordination   Romberg: negative  Finger to nose coordination: normal  Heel to shin coordination: normal  Tandem walking coordination: normal (no loss of balance, but slightly unsteady)    Tremor   Resting tremor: absent  Action tremor: absent    Reflexes   Right brachioradialis: 3+  Left brachioradialis: 3+  Right biceps: 3+  Left biceps: 3+  Right triceps: 3+  Left triceps: 3+  Right patellar: 3+  Left patellar: 3+  Right achilles: 3+  Left achilles: 3+  Right plantar: normal  Left plantar: normal  Right ankle clonus: absent  Left ankle clonus: absent  Right pendular knee jerk: absent  Left pendular knee jerk: absent  Normal Heel/toe walk  Normal RSM in upper and lower extremities  He can hop on each foot ten times             Imaging:     No new imaging to review today.     Labs:     Lab Results   Component Value Date    HOCMAQYA35YL 51 05/01/2018    COPQTNXC73UG 82 11/15/2016    ATUTTUKQ29RH >96 (A) 12/11/2013     Lab Results   Component Value Date    EF9EYYKV 60.8 01/26/2019    ABSOLUTECD3 600 (L) 01/26/2019    OC8LIBPC 14.4 01/26/2019    ABSOLUTECD8 142 (L) 01/26/2019    RI2YGURO 45.6 01/26/2019    ABSOLUTECD4 450 01/26/2019    LABCD48 3.18 01/26/2019    LABCD48 3.31 08/11/2018    LABCD48 3.36 05/01/2018     Lab Results   Component Value Date    WBC 3.32 (L) 01/19/2019    RBC 4.83 01/19/2019    HGB 13.9 (L) 01/19/2019    HCT 44.0 01/19/2019    MCV 91 01/19/2019    MCH 28.8 01/19/2019    MCHC 31.6 (L) 01/19/2019    RDW 12.6 01/19/2019     01/19/2019    MPV 9.4 01/19/2019    GRAN 1.9 01/19/2019    GRAN 56.1 01/19/2019    LYMPH 0.9 (L) 01/19/2019    LYMPH 27.1 01/19/2019    MONO 0.4 01/19/2019    MONO 10.5 01/19/2019    EOS 0.2 01/19/2019    BASO 0.02 01/19/2019    EOSINOPHIL 5.7 01/19/2019    BASOPHIL 0.6 01/19/2019     Sodium   Date Value Ref Range Status   09/28/2014 143 136 - 728  mmol/L Final     Potassium   Date Value Ref Range Status   09/28/2014 4.2 3.5 - 5.1 mmol/L Final     Chloride   Date Value Ref Range Status   09/28/2014 109 95 - 110 mmol/L Final     CO2   Date Value Ref Range Status   09/28/2014 25 23 - 29 mmol/L Final     Glucose   Date Value Ref Range Status   09/28/2014 100 70 - 110 mg/dL Final     BUN, Bld   Date Value Ref Range Status   09/28/2014 15 6 - 20 mg/dL Final     Creatinine   Date Value Ref Range Status   09/28/2014 0.9 0.5 - 1.4 mg/dL Final     Calcium   Date Value Ref Range Status   09/28/2014 9.5 8.7 - 10.5 mg/dL Final     Total Protein   Date Value Ref Range Status   01/19/2019 6.7 6.0 - 8.4 g/dL Final     Albumin   Date Value Ref Range Status   01/19/2019 3.8 3.5 - 5.2 g/dL Final     Total Bilirubin   Date Value Ref Range Status   01/19/2019 0.4 0.1 - 1.0 mg/dL Final     Comment:     For infants and newborns, interpretation of results should be based  on gestational age, weight and in agreement with clinical  observations.  Premature Infant recommended reference ranges:  Up to 24 hours.............<8.0 mg/dL  Up to 48 hours............<12.0 mg/dL  3-5 days..................<15.0 mg/dL  6-29 days.................<15.0 mg/dL       Alkaline Phosphatase   Date Value Ref Range Status   01/19/2019 62 55 - 135 U/L Final     AST   Date Value Ref Range Status   01/19/2019 22 10 - 40 U/L Final     ALT   Date Value Ref Range Status   01/19/2019 25 10 - 44 U/L Final     Anion Gap   Date Value Ref Range Status   09/28/2014 9 8 - 16 mmol/L Final     eGFR if    Date Value Ref Range Status   09/28/2014 >60 >60 mL/min/1.73 m^2 Final     eGFR if non    Date Value Ref Range Status   09/28/2014 >60 >60 mL/min/1.73 m^2 Final     Comment:     Calculation used to obtain the estimated glomerular filtration  rate (eGFR) is the CKD-EPI equation. Since race is unknown   in our information system, the eGFR values for   -American and  Non--American patients are given   for each creatinine result.           Diagnosis/Assessment/Plan:    1. Multiple Sclerosis  · Assessment: Helio is clinically stable today on Tecfidera.   · Imaging: MRI brain due this fall; ordered today  · Disease Modifying Therapies: Continue Tecfidera and Vitamin D. Will check CBC, Cd8, LFT, and Vitamin D on a Saturday in July.     2. MS Symptom Assessment / Management  · No change to symptom management plan today.     Over 50% of this 30 minute visit was spent in direct face to face counseling of the patient about MS, DMT considerations, and MS symptom management. The patient agrees with the plan of care. He will follow up with Dr. Junior after MRI in November.     Kendy Clark, [facility] HospitalNS-BC, MSCN      There are no diagnoses linked to this encounter.

## 2019-05-24 ENCOUNTER — PATIENT MESSAGE (OUTPATIENT)
Dept: NEUROLOGY | Facility: CLINIC | Age: 49
End: 2019-05-24

## 2019-05-28 ENCOUNTER — TELEPHONE (OUTPATIENT)
Dept: NEUROLOGY | Facility: CLINIC | Age: 49
End: 2019-05-28

## 2019-05-28 NOTE — TELEPHONE ENCOUNTER
----- Message from Chino Ross sent at 5/28/2019 12:31 PM CDT -----  Contact: [facility] @ [phone]   Authorization for (dimethyl fumarate (TECFIDERA) 120 mg CpDR ) was approved. The approval # is:  [id number]

## 2019-06-26 DIAGNOSIS — G35 MULTIPLE SCLEROSIS: ICD-10-CM

## 2019-06-27 RX ORDER — DIMETHYL FUMARATE 120 MG/1
CAPSULE ORAL
Qty: 60 CAPSULE | Refills: 0 | Status: SHIPPED | OUTPATIENT
Start: 2019-06-27 | End: 2019-11-26 | Stop reason: ALTCHOICE

## 2019-07-11 ENCOUNTER — PATIENT MESSAGE (OUTPATIENT)
Dept: NEUROLOGY | Facility: CLINIC | Age: 49
End: 2019-07-11

## 2019-07-20 ENCOUNTER — LAB VISIT (OUTPATIENT)
Dept: LAB | Facility: HOSPITAL | Age: 49
End: 2019-07-20
Payer: COMMERCIAL

## 2019-07-20 DIAGNOSIS — G35 MULTIPLE SCLEROSIS: ICD-10-CM

## 2019-07-20 LAB
25(OH)D3+25(OH)D2 SERPL-MCNC: 54 NG/ML (ref 30–96)
ALBUMIN SERPL BCP-MCNC: 4 G/DL (ref 3.5–5.2)
ALP SERPL-CCNC: 71 U/L (ref 55–135)
ALT SERPL W/O P-5'-P-CCNC: 24 U/L (ref 10–44)
AST SERPL-CCNC: 22 U/L (ref 10–40)
BASOPHILS # BLD AUTO: 0.04 K/UL (ref 0–0.2)
BASOPHILS NFR BLD: 1.5 % (ref 0–1.9)
BILIRUB DIRECT SERPL-MCNC: 0.2 MG/DL (ref 0.1–0.3)
BILIRUB SERPL-MCNC: 0.3 MG/DL (ref 0.1–1)
DIFFERENTIAL METHOD: ABNORMAL
EOSINOPHIL # BLD AUTO: 0.1 K/UL (ref 0–0.5)
EOSINOPHIL NFR BLD: 3.7 % (ref 0–8)
ERYTHROCYTE [DISTWIDTH] IN BLOOD BY AUTOMATED COUNT: 12.6 % (ref 11.5–14.5)
HCT VFR BLD AUTO: 43.1 % (ref 40–54)
HGB BLD-MCNC: 14.2 G/DL (ref 14–18)
LYMPHOCYTES # BLD AUTO: 0.7 K/UL (ref 1–4.8)
LYMPHOCYTES NFR BLD: 26.7 % (ref 18–48)
MCH RBC QN AUTO: 29.6 PG (ref 27–31)
MCHC RBC AUTO-ENTMCNC: 32.9 G/DL (ref 32–36)
MCV RBC AUTO: 90 FL (ref 82–98)
MONOCYTES # BLD AUTO: 0.4 K/UL (ref 0.3–1)
MONOCYTES NFR BLD: 13.9 % (ref 4–15)
NEUTROPHILS # BLD AUTO: 1.5 K/UL (ref 1.8–7.7)
NEUTROPHILS NFR BLD: 54.2 % (ref 38–73)
PLATELET # BLD AUTO: 239 K/UL (ref 150–350)
PMV BLD AUTO: 9.2 FL (ref 9.2–12.9)
PROT SERPL-MCNC: 6.9 G/DL (ref 6–8.4)
RBC # BLD AUTO: 4.8 M/UL (ref 4.6–6.2)
WBC # BLD AUTO: 2.73 K/UL (ref 3.9–12.7)

## 2019-07-20 PROCEDURE — 82306 VITAMIN D 25 HYDROXY: CPT

## 2019-07-20 PROCEDURE — 86359 T CELLS TOTAL COUNT: CPT

## 2019-07-20 PROCEDURE — 36415 COLL VENOUS BLD VENIPUNCTURE: CPT

## 2019-07-20 PROCEDURE — 86360 T CELL ABSOLUTE COUNT/RATIO: CPT

## 2019-07-20 PROCEDURE — 80076 HEPATIC FUNCTION PANEL: CPT

## 2019-07-20 PROCEDURE — 85025 COMPLETE CBC W/AUTO DIFF WBC: CPT

## 2019-07-22 ENCOUNTER — PATIENT MESSAGE (OUTPATIENT)
Dept: NEUROLOGY | Facility: CLINIC | Age: 49
End: 2019-07-22

## 2019-07-22 LAB
ABSOLUTE CD3: 433 CELLS/UL (ref 700–2100)
ABSOLUTE CD8: 103 CELLS/UL (ref 200–900)
CD3%: 56.1 % (ref 55–83)
CD3+CD4+ CELLS # BLD: 320 CELLS/UL (ref 300–1400)
CD3+CD4+ CELLS NFR BLD: 41.5 % (ref 28–57)
CD4/CD8 RATIO: 3.1 (ref 0.9–3.6)
CD8 % SUPPRESSOR T CELL: 13.4 % (ref 10–39)

## 2019-07-24 ENCOUNTER — PATIENT MESSAGE (OUTPATIENT)
Dept: NEUROLOGY | Facility: CLINIC | Age: 49
End: 2019-07-24

## 2019-08-09 ENCOUNTER — OFFICE VISIT (OUTPATIENT)
Dept: NEUROLOGY | Facility: CLINIC | Age: 49
End: 2019-08-09
Payer: COMMERCIAL

## 2019-08-09 ENCOUNTER — PATIENT MESSAGE (OUTPATIENT)
Dept: NEUROLOGY | Facility: CLINIC | Age: 49
End: 2019-08-09

## 2019-08-09 ENCOUNTER — TELEPHONE (OUTPATIENT)
Dept: NEUROLOGY | Facility: CLINIC | Age: 49
End: 2019-08-09

## 2019-08-09 VITALS
SYSTOLIC BLOOD PRESSURE: 108 MMHG | HEIGHT: 69 IN | WEIGHT: 155.13 LBS | BODY MASS INDEX: 22.98 KG/M2 | DIASTOLIC BLOOD PRESSURE: 71 MMHG | HEART RATE: 72 BPM

## 2019-08-09 DIAGNOSIS — Z79.899 ENCOUNTER FOR LONG-TERM (CURRENT) USE OF HIGH-RISK MEDICATION: ICD-10-CM

## 2019-08-09 DIAGNOSIS — G35 MULTIPLE SCLEROSIS: Primary | ICD-10-CM

## 2019-08-09 DIAGNOSIS — D72.810 LYMPHOPENIA: ICD-10-CM

## 2019-08-09 DIAGNOSIS — D84.9 IMMUNOSUPPRESSION: ICD-10-CM

## 2019-08-09 DIAGNOSIS — Z71.89 COUNSELING REGARDING GOALS OF CARE: ICD-10-CM

## 2019-08-09 PROCEDURE — 99999 PR PBB SHADOW E&M-EST. PATIENT-LVL III: CPT | Mod: PBBFAC,,, | Performed by: PHYSICIAN ASSISTANT

## 2019-08-09 PROCEDURE — 99215 PR OFFICE/OUTPT VISIT, EST, LEVL V, 40-54 MIN: ICD-10-PCS | Mod: S$GLB,,, | Performed by: PHYSICIAN ASSISTANT

## 2019-08-09 PROCEDURE — 99999 PR PBB SHADOW E&M-EST. PATIENT-LVL III: ICD-10-PCS | Mod: PBBFAC,,, | Performed by: PHYSICIAN ASSISTANT

## 2019-08-09 PROCEDURE — 99215 OFFICE O/P EST HI 40 MIN: CPT | Mod: S$GLB,,, | Performed by: PHYSICIAN ASSISTANT

## 2019-08-09 NOTE — Clinical Note
Switching to [employer]. He has stopped Tecfidera--hope is to switch in 4-6 weeks. His work is switching insurance very soon, so will need to wait to submit until he has new number.

## 2019-08-09 NOTE — PROGRESS NOTES
Subjective:       Patient ID: Helio Nolasco is a 48 y.o. male who presents today with his wife for a fit-in clinic visit discuss change in DMT      MS HPI:  · DMT: Tecfidera(stopped 2 weeks ago approx)  · Side effects from DMT? Yes - lymphopenia  · Taking vitamin D3 as recommended? Yes - 5,000IU 4x/wk   · Feeling well-no new neurological symptoms    SOCIAL HISTORY  Social History     Tobacco Use    Smoking status: Current Some Day Smoker     Types: Cigars    Smokeless tobacco: Never Used    Tobacco comment: smokes a cigar once a week   Substance Use Topics    Alcohol use: Yes     Alcohol/week: 0.0 oz     Comment: socially    Drug use: No     Living arrangements - the patient lives with their family.      MS ROS:  ·     MS REVIEW OF SYMPTOMS 8/6/2019   Do you feel abnormally tired on most days? No   Do you feel you generally sleep well? Yes   Do you have difficulty controlling your bladder?  No   Do you have difficulty controlling your bowels?  No   Do you have frequent muscle cramps, tightness or spasms in your limbs?  No   Do you have new visual symptoms?  No   Do you have worsening difficulty with your memory or thinking? No   Do you have worsening symptoms of anxiety or depression?  No   For patients who walk, Do you have more difficulty walking?  No   Have you fallen since your last visit?  No   For patients who use wheelchairs: Do you have any skin wounds or breakdown? Not Applicable   Do you have difficulty using your hands?  No   Do you have shooting or burning pain? No   Do you have difficulty with sexual function?  No   If you are sexually active, are you using birth control? Y/N  N/A Not Applicable   Do you often choke when swallowing liquids or solid food?  No   Do you experience worsening symptoms when overheated? Yes   Do you need any new equipment such as a wheelchair, walker or shower chair? No   Do you receive co-pay financial assistance for your principal MS medicine? Yes   Would you be  interested in participating in an MS research trial in the future? No   Do you feel you have adequate family/friend support?  Yes   Do you have health insurance?   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  Not Applicable   Do you use marijuana or cannabis products? No   Have you been diagnosed with a urinary tract infection since your last visit here? No   Have you been diagnosed with a respiratory tract infection since your last visit here? No   Have you been to the emergency room since your last visit here? No   Have you been hospitalized since your last visit here?  No     FSS SCORE & INTERPRETATION 8/6/2019   FSS SCORE  10   FSS SCORE INTERPRETATION May not be suffering from fatigue     MS AUDRA-D SCORE & INTERPRETATION 8/6/2019   AUDRA-D SCORE  12   AUDRA-D INTERPRETATION  No indication of Depression     MS RICARDO-7 SCORE & INTERPRETATION 8/6/2019   RICARDO-7 SCORE  0   RICARDO-7 SCORE INTERPRETATION Normal     PEQ MS MOS PAIN EFFECTS SCORE & INTERPRETATION 8/6/2019   PES SCORE 6   PES SCORE INTERPRETATION Scores can range from 6-30.  Items are scaled so that higher scores indicate a greater impact of pain on a patients mood and behavior.     PEQ MS SEXUAL SATISFACTION SCORE & INTERPRETATION 8/6/2019   SSS SCORE  13   SSS SCORE INTERPRETATION Scores can range from 4-24.  Higher scores indicate greater problems with sexual satisfaction.     MS BLADDER CONTROL SCORE & INTERPRETATION 8/6/2019   BLCS SCORE 0   BLCS SCORE INTERPRETATION  Scores can range from 0-22, with higher scores indicating greater bladder control problems.     MS BOWEL CONTROL SCORE & INTERPRETATION 8/6/2019   BWCS SCORE 0   BWCS SCORE INTERPRETATION Scores can range from 0-26, with higher scores indicating greater bowel control problems.     PEQ MS IMPACT OF VISUAL IMPAIRMENT SCORE & INTERPRETATION 8/6/2019   MANNY SCALE SCORE  0   MANNY SCORE INTERPRETATION Scores can range from 0-15, with higher  scores indicating greater impact of visual problems on daily activites.     MS PDQ SCORE & INTERPRETATION 8/6/2019   PDQ RETROSPECTIVE MEMORY SUBSCALE 6   PDQ ATTENTION/CONCENTRATION SUBSCALE 5   PDQ PROSPECTIVE MEMORY SUBSCALE 3   PDQ PLANNING/ORGANIZATION SUBSCALE 4   PDQ TOTAL SCORE 18   PDQ SCORE INTERPRETATION Scores can range from 0-80, with higher scores indicating greater perceived cognitive impairment.     MSSS SCORE & INTERPRETATION 8/6/2019   MSSS TANGIBLE SUPPORT SUBSCALE 93.75   MSSS EMOTIONAL/INFORMATIONAL SUPPORT SUBSCALE 100   MSSS AFFECTIONATE SUPPORT SUBSCALE 100   MSSS POSITIVE SOCIAL INTERACTION SUBSCALE 100   MSSS TOTAL SCORE 98.44   MSSS SCORE INTERPRETATION Scores can range from 0-100, with higher scores indicating greater perceived support.             Objective:        1. 25 foot timed walk:  Timed 25 Foot Walk: 9/7/2016 3/3/2017   Did patient wear an AFO? No No   Was assistive device used? No No   Time for 25 Foot Walk (seconds) 3.5 3.5   Time for 25 Foot Walk (seconds) 3.5 3.2     Neurologic Exam      Deferred today    Imaging:       Results for orders placed during the hospital encounter of 11/02/17   MRI Brain W WO Contrast    Narrative Procedure: MRI the brain with andwithout contrast.    Technique: Sagittal flair T2, axial T1, axial gradient and axial diffusion imaging of the whole brain pre-contrast.  Post contrast axial T2, axial T1, axial T2 flair and coronal thin sections oral gradient imaging of the whole brain. 7 mL of gadavist contrast was injected intravenously.            Comparison: 04/06/2015    Findings: Continue to several small sized foci of T2 flair signal hyperintensity within the supratentorial parenchyma most concentrated in the periventricular white matter along the margin of the lateral ventricles. There is a small lesion in the left anterior body of the corpus callosum. There is no corresponding diffusion restriction or enhancement these foci. There is no midline  shift or mass effect.    The ventricles are stable without hydrocephalus. There is no midline shift or mass effect. There is slight diffusion hyperintensity associated with the lesions within the margin of the splenium of the corpus callosum which are unchanged.    The ventricles are stable without hydrocephalus. There is no abnormal parenchymal susceptibility to suggest rectal hemorrhage.    There is no definite new lesion. No definite infratentorial lesions. Continued T2 flair signal hyperintensities along the undersurface of the splenium of the corpus callosum    The major intracranial T2 flow voids are present. No significant T1 signal hypointensity associated with T2 flair lesions.    Impression No significant change from prior. Continued few scattered foci of T2 flair signal hyperintense lesions supratentorial parenchyma remains most compatible with mild degree of prior demyelinating plaque burden.    There is no definite new lesion or enhancing lesion to suggest interval or active demyelination.     Clinical correlation and continued followup advised      Electronically signed by: JOSÉ CARSON DO  Date:     11/02/17  Time:    09:06        Labs:     Lab Results   Component Value Date    UCVHNEVC62EY 54 07/20/2019    TCNSUGHP90RI 51 05/01/2018    JUJCOVGQ45WK 82 11/15/2016     No results found for: JCVINDEX, JCVANTIBODY  Lab Results   Component Value Date    XG9BQCJP 56.1 07/20/2019    ABSOLUTECD3 433 (L) 07/20/2019    NH1XQRUV 13.4 07/20/2019    ABSOLUTECD8 103 (L) 07/20/2019    VA8ZCZLY 41.5 07/20/2019    ABSOLUTECD4 320 07/20/2019    LABCD48 3.10 07/20/2019     Lab Results   Component Value Date    WBC 2.73 (L) 07/20/2019    RBC 4.80 07/20/2019    HGB 14.2 07/20/2019    HCT 43.1 07/20/2019    MCV 90 07/20/2019    MCH 29.6 07/20/2019    MCHC 32.9 07/20/2019    RDW 12.6 07/20/2019     07/20/2019    MPV 9.2 07/20/2019    GRAN 1.5 (L) 07/20/2019    GRAN 54.2 07/20/2019    LYMPH 0.7 (L) 07/20/2019    LYMPH  26.7 07/20/2019    MONO 0.4 07/20/2019    MONO 13.9 07/20/2019    EOS 0.1 07/20/2019    BASO 0.04 07/20/2019    EOSINOPHIL 3.7 07/20/2019    BASOPHIL 1.5 07/20/2019     Sodium   Date Value Ref Range Status   09/28/2014 143 136 - 145 mmol/L Final     Potassium   Date Value Ref Range Status   09/28/2014 4.2 3.5 - 5.1 mmol/L Final     Chloride   Date Value Ref Range Status   09/28/2014 109 95 - 110 mmol/L Final     CO2   Date Value Ref Range Status   09/28/2014 25 23 - 29 mmol/L Final     Glucose   Date Value Ref Range Status   09/28/2014 100 70 - 110 mg/dL Final     BUN, Bld   Date Value Ref Range Status   09/28/2014 15 6 - 20 mg/dL Final     Creatinine   Date Value Ref Range Status   09/28/2014 0.9 0.5 - 1.4 mg/dL Final     Calcium   Date Value Ref Range Status   09/28/2014 9.5 8.7 - 10.5 mg/dL Final     Total Protein   Date Value Ref Range Status   07/20/2019 6.9 6.0 - 8.4 g/dL Final     Albumin   Date Value Ref Range Status   07/20/2019 4.0 3.5 - 5.2 g/dL Final     Total Bilirubin   Date Value Ref Range Status   07/20/2019 0.3 0.1 - 1.0 mg/dL Final     Comment:     For infants and newborns, interpretation of results should be based  on gestational age, weight and in agreement with clinical  observations.  Premature Infant recommended reference ranges:  Up to 24 hours.............<8.0 mg/dL  Up to 48 hours............<12.0 mg/dL  3-5 days..................<15.0 mg/dL  6-29 days.................<15.0 mg/dL       Alkaline Phosphatase   Date Value Ref Range Status   07/20/2019 71 55 - 135 U/L Final     AST   Date Value Ref Range Status   07/20/2019 22 10 - 40 U/L Final     ALT   Date Value Ref Range Status   07/20/2019 24 10 - 44 U/L Final     Anion Gap   Date Value Ref Range Status   09/28/2014 9 8 - 16 mmol/L Final     eGFR if    Date Value Ref Range Status   09/28/2014 >60 >60 mL/min/1.73 m^2 Final     eGFR if non    Date Value Ref Range Status   09/28/2014 >60 >60 mL/min/1.73 m^2  Final     Comment:     Calculation used to obtain the estimated glomerular filtration  rate (eGFR) is the CKD-EPI equation. Since race is unknown   in our information system, the eGFR values for   -American and Non--American patients are given   for each creatinine result.         Diagnosis/Assessment/Plan:    1. Multiple Sclerosis  · Assessment: Patient and his wife present today to discuss change in DMT due to lymphopenia(ALC-728, CD8-103)  · Imaging: will plan for 6 month interval MRI on new DMT   · Disease Modifying Therapies: Patient has been off DMT for two weeks now. Although clinically stable he does have lymphopenia and due to known PML risk we have discontinued Tecfidera. He was previously on Rebif which was discontinued due to necrosis of UE with injection. Would like to change to Ocrevus, a PARQ discussion was held concerning use of Ocrevus for RRMS and all questions/concerns were addressed to patient's and wife's  satisfaction. Screening labs ordered, and referral placed for ID to evaluate for appropriate immunizations. All forms signed today in clinic.       Our visit today lasted 40 minutes, and 100% of this time was spent face to face with the patient. Over 50% of this visit included discussion of the treatment plan/medication changes/symptom management/exam findings/imaging results/coordination of care. The patient agrees with the plan of care.     Follow up in about 3 months (around 11/9/2019) for follow up with me.  Patient agreed to POC today.    Attending, Dr. Junior, was available during today's encounter.     Radha Silveira PA-C  [facility]      Problem List Items Addressed This Visit        Neuro    Multiple sclerosis - Primary    Relevant Orders    CBC auto differential    Comprehensive metabolic panel    [place]-Suppressor Ratio    Hepatitis B core antibody, total    Hepatitis B surface antibody    Hepatitis B surface antigen    Hepatitis C antibody    HIV 1/2 Ag/Ab (4th Gen)     Quantiferon Gold TB    Varicella zoster antibody, IgG    RPR    Hepatitis A antibody, IgG    STRONGYLOIDES IGG ANTIBODIES    Ambulatory Referral to Infectious Disease       Other    Encounter for long-term (current) use of high-risk medication    Relevant Orders    CBC auto differential    Comprehensive metabolic panel    [place]-Suppressor Ratio    Hepatitis B core antibody, total    Hepatitis B surface antibody    Hepatitis B surface antigen    Hepatitis C antibody    HIV 1/2 Ag/Ab (4th Gen)    Quantiferon Gold TB    Varicella zoster antibody, IgG    RPR    Hepatitis A antibody, IgG    STRONGYLOIDES IGG ANTIBODIES    Ambulatory Referral to Infectious Disease    Counseling regarding goals of care      Other Visit Diagnoses     Immunosuppression        Relevant Orders    CBC auto differential    Comprehensive metabolic panel    [place]-Suppressor Ratio    Hepatitis B core antibody, total    Hepatitis B surface antibody    Hepatitis B surface antigen    Hepatitis C antibody    HIV 1/2 Ag/Ab (4th Gen)    Quantiferon Gold TB    Varicella zoster antibody, IgG    RPR    Hepatitis A antibody, IgG    STRONGYLOIDES IGG ANTIBODIES    Ambulatory Referral to Infectious Disease    Lymphopenia

## 2019-08-09 NOTE — TELEPHONE ENCOUNTER
----- Message from Radha Silveira PA-C sent at 8/9/2019  8:39 AM CDT -----  Switching to Ocrevus. He has stopped Tecfidera--hope is to switch in 4-6 weeks. His work is switching insurance very soon, so will need to wait to submit until he has new number.

## 2019-08-12 ENCOUNTER — LAB VISIT (OUTPATIENT)
Dept: LAB | Facility: HOSPITAL | Age: 49
End: 2019-08-12
Payer: COMMERCIAL

## 2019-08-12 DIAGNOSIS — D84.9 IMMUNOSUPPRESSION: ICD-10-CM

## 2019-08-12 DIAGNOSIS — Z79.899 ENCOUNTER FOR LONG-TERM (CURRENT) USE OF HIGH-RISK MEDICATION: ICD-10-CM

## 2019-08-12 DIAGNOSIS — G35 MULTIPLE SCLEROSIS: ICD-10-CM

## 2019-08-12 LAB
ALBUMIN SERPL BCP-MCNC: 4.2 G/DL (ref 3.5–5.2)
ALP SERPL-CCNC: 65 U/L (ref 55–135)
ALT SERPL W/O P-5'-P-CCNC: 23 U/L (ref 10–44)
ANION GAP SERPL CALC-SCNC: 9 MMOL/L (ref 8–16)
AST SERPL-CCNC: 21 U/L (ref 10–40)
BASOPHILS # BLD AUTO: 0.01 K/UL (ref 0–0.2)
BASOPHILS NFR BLD: 0.4 % (ref 0–1.9)
BILIRUB SERPL-MCNC: 0.7 MG/DL (ref 0.1–1)
BUN SERPL-MCNC: 14 MG/DL (ref 6–20)
CALCIUM SERPL-MCNC: 9.3 MG/DL (ref 8.7–10.5)
CHLORIDE SERPL-SCNC: 105 MMOL/L (ref 95–110)
CO2 SERPL-SCNC: 26 MMOL/L (ref 23–29)
CREAT SERPL-MCNC: 1 MG/DL (ref 0.5–1.4)
DIFFERENTIAL METHOD: ABNORMAL
EOSINOPHIL # BLD AUTO: 0.1 K/UL (ref 0–0.5)
EOSINOPHIL NFR BLD: 3.8 % (ref 0–8)
ERYTHROCYTE [DISTWIDTH] IN BLOOD BY AUTOMATED COUNT: 12.2 % (ref 11.5–14.5)
EST. GFR  (AFRICAN AMERICAN): >60 ML/MIN/1.73 M^2
EST. GFR  (NON AFRICAN AMERICAN): >60 ML/MIN/1.73 M^2
GLUCOSE SERPL-MCNC: 102 MG/DL (ref 70–110)
HBV CORE AB SERPL QL IA: NEGATIVE
HBV SURFACE AB SER-ACNC: NEGATIVE M[IU]/ML
HBV SURFACE AG SERPL QL IA: NEGATIVE
HCT VFR BLD AUTO: 45.1 % (ref 40–54)
HCV AB SERPL QL IA: NEGATIVE
HEPATITIS A ANTIBODY, IGG: NEGATIVE
HGB BLD-MCNC: 14.9 G/DL (ref 14–18)
HIV 1+2 AB+HIV1 P24 AG SERPL QL IA: NEGATIVE
LYMPHOCYTES # BLD AUTO: 1 K/UL (ref 1–4.8)
LYMPHOCYTES NFR BLD: 36.5 % (ref 18–48)
MCH RBC QN AUTO: 29.1 PG (ref 27–31)
MCHC RBC AUTO-ENTMCNC: 33 G/DL (ref 32–36)
MCV RBC AUTO: 88 FL (ref 82–98)
MONOCYTES # BLD AUTO: 0.2 K/UL (ref 0.3–1)
MONOCYTES NFR BLD: 8 % (ref 4–15)
NEUTROPHILS # BLD AUTO: 1.3 K/UL (ref 1.8–7.7)
NEUTROPHILS NFR BLD: 51.3 % (ref 38–73)
PLATELET # BLD AUTO: 249 K/UL (ref 150–350)
PMV BLD AUTO: 9.4 FL (ref 9.2–12.9)
POTASSIUM SERPL-SCNC: 4.1 MMOL/L (ref 3.5–5.1)
PROT SERPL-MCNC: 7.2 G/DL (ref 6–8.4)
RBC # BLD AUTO: 5.12 M/UL (ref 4.6–6.2)
SODIUM SERPL-SCNC: 140 MMOL/L (ref 136–145)
WBC # BLD AUTO: 2.63 K/UL (ref 3.9–12.7)

## 2019-08-12 PROCEDURE — 86706 HEP B SURFACE ANTIBODY: CPT

## 2019-08-12 PROCEDURE — 86787 VARICELLA-ZOSTER ANTIBODY: CPT

## 2019-08-12 PROCEDURE — 86359 T CELLS TOTAL COUNT: CPT

## 2019-08-12 PROCEDURE — 36415 COLL VENOUS BLD VENIPUNCTURE: CPT

## 2019-08-12 PROCEDURE — 86703 HIV-1/HIV-2 1 RESULT ANTBDY: CPT

## 2019-08-12 PROCEDURE — 85025 COMPLETE CBC W/AUTO DIFF WBC: CPT

## 2019-08-12 PROCEDURE — 86704 HEP B CORE ANTIBODY TOTAL: CPT

## 2019-08-12 PROCEDURE — 86790 VIRUS ANTIBODY NOS: CPT

## 2019-08-12 PROCEDURE — 86682 HELMINTH ANTIBODY: CPT

## 2019-08-12 PROCEDURE — 86360 T CELL ABSOLUTE COUNT/RATIO: CPT

## 2019-08-12 PROCEDURE — 86803 HEPATITIS C AB TEST: CPT

## 2019-08-12 PROCEDURE — 86592 SYPHILIS TEST NON-TREP QUAL: CPT

## 2019-08-12 PROCEDURE — 80053 COMPREHEN METABOLIC PANEL: CPT

## 2019-08-12 PROCEDURE — 87340 HEPATITIS B SURFACE AG IA: CPT

## 2019-08-13 LAB — RPR SER QL: NORMAL

## 2019-08-14 LAB
ABSOLUTE CD3: 546 CELLS/UL (ref 700–2100)
ABSOLUTE CD8: 153 CELLS/UL (ref 200–900)
CD3%: 55.1 % (ref 55–83)
CD3+CD4+ CELLS # BLD: 389 CELLS/UL (ref 300–1400)
CD3+CD4+ CELLS NFR BLD: 39.2 % (ref 28–57)
CD4/CD8 RATIO: 2.54 (ref 0.9–3.6)
CD8 % SUPPRESSOR T CELL: 15.4 % (ref 10–39)
STRONGYLOIDES ANTIBODY IGG: NEGATIVE
VARICELLA INTERPRETATION: POSITIVE
VARICELLA ZOSTER IGG: 3.52 ISR (ref 0–0.9)

## 2019-08-14 NOTE — TELEPHONE ENCOUNTER
----- Message from Radha Silveira PA-C sent at 8/14/2019 12:29 PM CDT -----  Switching to Ocrevus-off Tecfidera  Hep B core, total-negative  Hep B surface antibody-negative  Hep B surface antigen-negative  CD8 improved-153  ALC-1000(improved), ANC -1300  CMP normal  All other screening negative or still pending  ID appt 8/20/2019

## 2019-08-20 ENCOUNTER — OFFICE VISIT (OUTPATIENT)
Dept: INFECTIOUS DISEASES | Facility: CLINIC | Age: 49
End: 2019-08-20
Payer: COMMERCIAL

## 2019-08-20 ENCOUNTER — CLINICAL SUPPORT (OUTPATIENT)
Dept: INFECTIOUS DISEASES | Facility: CLINIC | Age: 49
End: 2019-08-20
Payer: COMMERCIAL

## 2019-08-20 VITALS
HEART RATE: 80 BPM | BODY MASS INDEX: 23.05 KG/M2 | SYSTOLIC BLOOD PRESSURE: 111 MMHG | TEMPERATURE: 99 F | DIASTOLIC BLOOD PRESSURE: 75 MMHG | HEIGHT: 69 IN | WEIGHT: 155.63 LBS

## 2019-08-20 DIAGNOSIS — Z79.899 LONG TERM CURRENT USE OF IMMUNOSUPPRESSIVE DRUG: Primary | ICD-10-CM

## 2019-08-20 DIAGNOSIS — Z79.899 LONG TERM CURRENT USE OF IMMUNOSUPPRESSIVE DRUG: ICD-10-CM

## 2019-08-20 PROCEDURE — 90472 IMMUNIZATION ADMIN EACH ADD: CPT | Mod: PBBFAC

## 2019-08-20 PROCEDURE — 90632 HEPA VACCINE ADULT IM: CPT | Mod: PBBFAC

## 2019-08-20 PROCEDURE — 90471 IMMUNIZATION ADMIN: CPT | Mod: PBBFAC

## 2019-08-20 PROCEDURE — 99204 OFFICE O/P NEW MOD 45 MIN: CPT | Mod: S$GLB,,, | Performed by: PHYSICIAN ASSISTANT

## 2019-08-20 PROCEDURE — 99999 PR PBB SHADOW E&M-EST. PATIENT-LVL III: CPT | Mod: PBBFAC,,, | Performed by: PHYSICIAN ASSISTANT

## 2019-08-20 PROCEDURE — 99999 PR PBB SHADOW E&M-EST. PATIENT-LVL III: ICD-10-PCS | Mod: PBBFAC,,, | Performed by: PHYSICIAN ASSISTANT

## 2019-08-20 PROCEDURE — 99204 PR OFFICE/OUTPT VISIT, NEW, LEVL IV, 45-59 MIN: ICD-10-PCS | Mod: S$GLB,,, | Performed by: PHYSICIAN ASSISTANT

## 2019-08-20 NOTE — PROGRESS NOTES
Mr. Nolasco received Prevnar 13, Hepatitis A, and Heplisav vaccines   Tolerated well  Left unit in NAD

## 2019-08-20 NOTE — LETTER
August 20, 2019      Radha Silveira PA-C  [address]  [facility] [address]           [facility]  [address]  [facility] [address]  Phone: [phone]  Fax: [phone]          Patient: eHlio Nolasco   MR Number: [id number]   YOB: 1970   Date of Visit: 8/20/2019       Dear Radha Silveira:    Thank you for referring Helio Nolasco to me for evaluation. Attached you will find relevant portions of my assessment and plan of care.    If you have questions, please do not hesitate to call me. I look forward to following Helio Nolasco along with you.    Sincerely,    Elidia Ashraf PA-C    Enclosure  CC:  No Recipients    If you would like to receive this communication electronically, please contact [email or web address] or [phone] to request more information on [facility] Link access.    For providers and/or their staff who would like to refer a patient to [facility], please contact us through our one-stop-shop provider referral line, [facility], at [phone].    If you feel you have received this communication in error or would no longer like to receive these types of communications, please e-mail [email or web address]

## 2019-08-20 NOTE — PROGRESS NOTES
Pre Biologic Response Modifier Therapy Consult  BMR Recipient Evaluation      Reason for Visit:    Chief Complaint   Patient presents with    Immunizations     History of Present Illness  Helio Nolasco is a 48 y.o. year old Black or  male with advanced Multiple Sclerosis currently being evaluated for prior to starting biologic response modifer (BRM) therapy.  He is switching from Tecfidera due to lymphopenia onto Ocrevus. Patient denies any recent fever, chills, or infective infective illnesses.    1) Do you have a history of:    YES NO   Diabetes                 []       [x]   Diabetic Foot Infection/Bone Infection  []  [x]   Surgical Removal of Spleen    []  [x]      2) Have you had recurrent infections involving:        YES NO  Sinus infections  []  [x]  Sore Throat   []  [x]                             Prostate Infections  []  [x]  .                         Bladder Infections  []  [x]                                 Kidney Infections  []  [x]        Intestinal Infections  []  [x]   Skin Infections   []  [x]                   Reproductive Infections               Periodontal Disease                   3)Have you ever had: YES     NO UNKNOWN      Chicken Pox   [x]  []  []                 Shingles   []  [x]  []                Orolabial Herpes             []  [x]  []       Genital Herpes  []  [x]  []           Cytomegalovirus  []  [x]  []               Clement-Barr Virus  []  [x]  []              Genital Warts   []  [x]  []                Hepatitis A   []  [x]  []             Hepatitis B   []  [x]  []                Hepatitis C   []  [x]  []                 Syphilis   []  [x]  []                Gonorrhea   []  [x]  []                 Pelvic Inflammatory  []  [x]  []   Disease   []  [x]  []                    Chlamydia Infection  []  [x]  []                Intestinal parasites        or worms   []  [x]  []                 Fungal Infections  []  [x]  []                Blood Infections  []  [x]  []      Comment:     Reports having necrosis of his LUE 2/2 MS medication (Rebif). Underwent I&D of the left arm in 2014.       4) Have you ever been exposed   YES NO  to someone with tuberculosis?  []  [x]   If yes, what treatment did you receive:     5) What states have you lived in? [place], [place]     6) What countries have you visited for more than 2 weeks?  none                         YES NO  7) Did you have any associated infections?     []  [x]     8) Are you planning to travel outside the           [place] after starting BRMs?    []   [x]           Household                  YES NO  9 Do you have pets living in your house?   [x]   []             If yes, describe:  1 indoor cat     Do you spend time or live on a farm or                 have livestock or other farm animals?   []  [x]   If yes, which ones:    Do you have a fish tank?     []  [x]                       Do you have a litter box?     []  [x]                        Do you fish or hunt?      []  [x]                       Do you clean or skin fish or animals?   []  [x]                      Do you consume raw or undercooked                meat, fish, or shellfish?     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 .                                     12)Do you garden or otherwise  YES NO   work in the soil?    []  [x]   13)Do you hike, camp, or spend   time in wooded areas?   []  [x]                        14) The patient's immunization history was reviewed.   Have you ever received:  YES NO UNKNOWN DATES  Routine Childhood vaccines  [x]  []  []                Influenza vaccine   [x]  []  []         Pneumonia    []  [x]  []      Tetanus-diptheria   [x]  []  []   Hepatitis A vaccine series       []  [x]  []       Hepatitis B vaccine series       []  [x]  []        Meningitis vaccine   []  [x]  []     Varicella vaccine   []  [x]  []                  Based on the patients immunization history and serologies,  immunizations were ordered:         Ordered  Not Ordered  Influenza Vaccine     []    [x]   Hepatitis A series at 0, 6 months   [x]    []   Hepatitis B High Dose series 0,1, and 6 months [x]    []   Prevnar      [x]    []   Pneumovax      []    [x]   TDap       []    [x]   Zoster       []    [x]   Menactra      []    [x]   HiB       []    []               The patient was encouraged to contact us about any problems that may develop after immunization and possible side effects were reviewed.       Allergies: Patient has no known allergies.  Immunization History   Administered Date(s) Administered    Hepatitis A, Adult 08/20/2019    Hepatitis B (recombinant) Adjuvanted, 2 dose 08/20/2019    Pneumococcal Conjugate - 13 Valent 08/20/2019    Td - PF (ADULT) 05/20/2015     Past Medical History:   Diagnosis Date    Multiple sclerosis      Past Surgical History:   Procedure Laterality Date    DEBRIDEMENT-WOUND Left Arm Left 11/20/2014    Performed by Bro Horner MD at [facility] OR 2ND [facility]    DEBRIDEMENT-WOUND/Removal of Lesion Left  Arm Left 10/6/2014    Performed by Bro Horner MD at [facility] OR 1ST [facility]LR    HAND SURGERY      2 screws and a plate in hand    tissue surgery  10/2014    necrosis in left arm due rebif injections      Social History     Socioeconomic History    Marital status:      Spouse name: Not on file    Number of children: Not on file    Years of education: Not on file    Highest education level: Not on file   Occupational History    Not on file   Social Needs    Financial resource strain: Not on file    Food insecurity:     Worry: Not on file     Inability: Not on file    Transportation needs:     Medical: Not on file     Non-medical: Not on file   Tobacco Use    Smoking status: Current Some Day Smoker     Types: Cigars    Smokeless tobacco: Never Used    Tobacco comment: smokes a cigar once a week   Substance and Sexual Activity    Alcohol use: Yes     Alcohol/week: 0.0  oz     Comment: socially    Drug use: No    Sexual activity: Not Currently   Lifestyle    Physical activity:     Days per week: Not on file     Minutes per session: Not on file    Stress: Not on file   Relationships    Social connections:     Talks on phone: Not on file     Gets together: Not on file     Attends Sabianism service: Not on file     Active member of club or organization: Not on file     Attends meetings of clubs or organizations: Not on file     Relationship status: Not on file   Other Topics Concern    Not on file   Social History Narrative    Lives at home with his wife and son. Work full-time [employer] as a  6 days a week with 12 hour shifts.        Review of Systems   Constitution: Negative for fever, malaise/fatigue and night sweats.   Cardiovascular: Negative for chest pain.   Respiratory: Negative for shortness of breath.    Gastrointestinal: Negative for abdominal pain, diarrhea, nausea and vomiting.   Genitourinary: Negative for dysuria.   Neurological: Negative for dizziness.     Physical Exam   Constitutional: He appears well-developed and well-nourished. No distress.   HENT:   Head: Normocephalic.   Eyes: Pupils are equal, round, and reactive to light.   Cardiovascular: Normal rate and regular rhythm.   No murmur heard.  Pulmonary/Chest: Effort normal. No respiratory distress.   Abdominal: Soft. He exhibits no distension.   Musculoskeletal: Normal range of motion. He exhibits no edema, tenderness or deformity.   Neurological: He is alert.   Skin: Skin is warm and dry. No rash noted. He is not diaphoretic. No erythema.   Psychiatric: He has a normal mood and affect.   Vitals reviewed.    Diagnostics:   RPR   Date Value Ref Range Status   08/12/2019 Non-reactive Non-reactive Final     No results found for: CMVANTIBODIE  No results found for: HIV1X2  No results found for: HTLVIIIANTIB  Hepatitis B Surface Ag   Date Value Ref Range Status   08/12/2019 Negative   Final     Hep B Core Total Ab   Date Value Ref Range Status   08/12/2019 Negative  Final     Hepatitis C Ab   Date Value Ref Range Status   08/12/2019 Negative  Final     No results found for: TOXOIGG  No components found for: TOXOIGGINTER  No results found for: UPX2IMZ  No results found for: FIM9GEL  Varicella Zoster IgG   Date Value Ref Range Status   08/12/2019 3.52 (H) 0.00 - 0.90 ISR Final     Varicella Interpretation   Date Value Ref Range Status   08/12/2019 Positive (A) Negative Final     Comment:     <or=0.90     Negative        No detectable IgG antibody to Varicella zoster  by the SAJAN test. Such individuals are presumed to be   uninfected with Varicella zoster and to be susceptible to   primary infection.  0.91-1.09    Equivocal  >or=1.10     Positive        Indicates presence of detectable IgG antibody to Varicella   zoster by the SAJAN test. Indicative of previous or current   infection.       Strongyloides Ab IgG   Date Value Ref Range Status   08/12/2019 Negative Negative Final     Comment:     No detectable levels of IgG antibodies to Strongyloides.  Repeat testing in 1-2 weeks if clinically indicated.  Test Performed by:  [facility]  [address] [id number]       No results found for: EPSTEINBARRV  Hep B S Ab   Date Value Ref Range Status   08/12/2019 Negative  Final     No results found for: QUANTIFERON  No results found for: HEPAIGM  No results found for: PPD  No results found for this or any previous visit.        BRM - Candidacy    Biologic Response Modifier Candidacy: Based on available information, there are no identified significant barriers to BRMs from an infectious disease standpoint pending acceptable serologies.  Final determination of BRM candidacy will be made once evaluation is complete and reviewed.    quantiferon gold TB test today    Vaccines today  Hepatitis A vaccine today, 6 months  Heplisav b vaccine today 1  month  prevnar vaccine  Pneumovax vaccine in 2 months   Offered shingrix vaccine. Pt will think about it.     Elidia Ashraf PA-C        Counseling:I discussed with Helio the risk for increased susceptibility to infections following BRM therapy including increased risk for infection.  The patients has been counseled on the importance of vaccinations including but not limited to a yearly flu vaccine.Specific guidance has been provided to the patient regarding the patients occupation, hobbies and activities to avoid future infectious complications including but not limited to avoiding undercooked meats and seafood, proper hygiene, and contact with animals.

## 2019-08-27 ENCOUNTER — PATIENT MESSAGE (OUTPATIENT)
Dept: NEUROLOGY | Facility: CLINIC | Age: 49
End: 2019-08-27

## 2019-09-10 ENCOUNTER — PATIENT MESSAGE (OUTPATIENT)
Dept: NEUROLOGY | Facility: CLINIC | Age: 49
End: 2019-09-10

## 2019-09-14 RX ORDER — ACETAMINOPHEN 325 MG/1
1000 TABLET ORAL
Status: CANCELLED | OUTPATIENT
Start: 2019-09-15

## 2019-09-14 RX ORDER — HEPARIN 100 UNIT/ML
500 SYRINGE INTRAVENOUS
Status: CANCELLED | OUTPATIENT
Start: 2019-09-15

## 2019-09-14 RX ORDER — SODIUM CHLORIDE 0.9 % (FLUSH) 0.9 %
10 SYRINGE (ML) INJECTION
Status: CANCELLED | OUTPATIENT
Start: 2019-09-15

## 2019-09-14 RX ORDER — DIPHENHYDRAMINE HYDROCHLORIDE 50 MG/ML
50 INJECTION INTRAMUSCULAR; INTRAVENOUS
Status: CANCELLED | OUTPATIENT
Start: 2019-09-15

## 2019-09-14 RX ORDER — EPINEPHRINE 0.3 MG/.3ML
0.3 INJECTION SUBCUTANEOUS
Status: CANCELLED | OUTPATIENT
Start: 2019-09-15

## 2019-09-14 RX ORDER — FAMOTIDINE 10 MG/ML
20 INJECTION INTRAVENOUS
Status: CANCELLED | OUTPATIENT
Start: 2019-09-15

## 2019-09-17 ENCOUNTER — CLINICAL SUPPORT (OUTPATIENT)
Dept: INFECTIOUS DISEASES | Facility: CLINIC | Age: 49
End: 2019-09-17
Payer: COMMERCIAL

## 2019-09-17 DIAGNOSIS — Z79.899 LONG TERM CURRENT USE OF IMMUNOSUPPRESSIVE DRUG: ICD-10-CM

## 2019-09-17 PROCEDURE — 90739 HEPB VACC 2/4 DOSE ADULT IM: CPT | Mod: S$GLB,,, | Performed by: INTERNAL MEDICINE

## 2019-09-17 PROCEDURE — 90471 HEPATITIS B (RECOMBINANT) ADJUVANTED, 2 DOSE: ICD-10-PCS | Mod: S$GLB,,, | Performed by: INTERNAL MEDICINE

## 2019-09-17 PROCEDURE — 90739 HEPATITIS B (RECOMBINANT) ADJUVANTED, 2 DOSE: ICD-10-PCS | Mod: S$GLB,,, | Performed by: INTERNAL MEDICINE

## 2019-09-17 PROCEDURE — 90471 IMMUNIZATION ADMIN: CPT | Mod: S$GLB,,, | Performed by: INTERNAL MEDICINE

## 2019-09-27 ENCOUNTER — INFUSION (OUTPATIENT)
Dept: INFUSION THERAPY | Facility: HOSPITAL | Age: 49
End: 2019-09-27
Attending: PHYSICIAN ASSISTANT
Payer: COMMERCIAL

## 2019-09-27 VITALS
RESPIRATION RATE: 17 BRPM | OXYGEN SATURATION: 98 % | SYSTOLIC BLOOD PRESSURE: 123 MMHG | DIASTOLIC BLOOD PRESSURE: 58 MMHG | TEMPERATURE: 98 F | HEART RATE: 75 BPM

## 2019-09-27 DIAGNOSIS — G35 MS (MULTIPLE SCLEROSIS): Primary | ICD-10-CM

## 2019-09-27 PROCEDURE — 96413 CHEMO IV INFUSION 1 HR: CPT

## 2019-09-27 PROCEDURE — 96415 CHEMO IV INFUSION ADDL HR: CPT

## 2019-09-27 PROCEDURE — 25000003 PHARM REV CODE 250: Performed by: PHYSICIAN ASSISTANT

## 2019-09-27 PROCEDURE — 96375 TX/PRO/DX INJ NEW DRUG ADDON: CPT

## 2019-09-27 PROCEDURE — 96367 TX/PROPH/DG ADDL SEQ IV INF: CPT

## 2019-09-27 PROCEDURE — 63600175 PHARM REV CODE 636 W HCPCS: Performed by: PHYSICIAN ASSISTANT

## 2019-09-27 PROCEDURE — S0028 INJECTION, FAMOTIDINE, 20 MG: HCPCS | Performed by: PHYSICIAN ASSISTANT

## 2019-09-27 RX ORDER — EPINEPHRINE 0.3 MG/.3ML
0.3 INJECTION SUBCUTANEOUS
Status: CANCELLED | OUTPATIENT
Start: 2019-10-11

## 2019-09-27 RX ORDER — DIPHENHYDRAMINE HYDROCHLORIDE 50 MG/ML
50 INJECTION INTRAMUSCULAR; INTRAVENOUS
Status: CANCELLED | OUTPATIENT
Start: 2019-10-11

## 2019-09-27 RX ORDER — FAMOTIDINE 10 MG/ML
20 INJECTION INTRAVENOUS
Status: CANCELLED | OUTPATIENT
Start: 2019-10-11

## 2019-09-27 RX ORDER — FAMOTIDINE 10 MG/ML
20 INJECTION INTRAVENOUS
Status: COMPLETED | OUTPATIENT
Start: 2019-09-27 | End: 2019-09-27

## 2019-09-27 RX ORDER — HEPARIN 100 UNIT/ML
500 SYRINGE INTRAVENOUS
Status: CANCELLED | OUTPATIENT
Start: 2019-10-11

## 2019-09-27 RX ORDER — SODIUM CHLORIDE 0.9 % (FLUSH) 0.9 %
10 SYRINGE (ML) INJECTION
Status: CANCELLED | OUTPATIENT
Start: 2019-10-11

## 2019-09-27 RX ORDER — ACETAMINOPHEN 500 MG
1000 TABLET ORAL
Status: COMPLETED | OUTPATIENT
Start: 2019-09-27 | End: 2019-09-27

## 2019-09-27 RX ORDER — ACETAMINOPHEN 500 MG
1000 TABLET ORAL
Status: CANCELLED | OUTPATIENT
Start: 2019-10-11

## 2019-09-27 RX ADMIN — OCRELIZUMAB 300 MG: 300 INJECTION INTRAVENOUS at 10:09

## 2019-09-27 RX ADMIN — DIPHENHYDRAMINE HYDROCHLORIDE 50 MG: 50 INJECTION INTRAMUSCULAR; INTRAVENOUS at 08:09

## 2019-09-27 RX ADMIN — DEXTROSE: 50 INJECTION, SOLUTION INTRAVENOUS at 09:09

## 2019-09-27 RX ADMIN — ACETAMINOPHEN 1000 MG: 500 TABLET ORAL at 08:09

## 2019-09-27 RX ADMIN — FAMOTIDINE 20 MG: 10 INJECTION INTRAVENOUS at 09:09

## 2019-09-27 NOTE — PLAN OF CARE
Pt presented for first dose of Ocrevus 300mg. Pt and pt's wife oriented to unit, staff, and infusion procedures. Consent verified in media. Appropriate labwork verified in pt's chart. Pt reported fatigue and heat intolerance. Premedicated for Ocrevus with PO Tylenol, IVPB Benadryl, IVP Pepcid, and IVPB Solumedrol. Ocrevus titrated per order. Vitals monitored per order. VSS throughout treatment. Monitored for 1 hr post infusion with no reactions noted. Lunch provided. Ambulated independently into and out of unit. Distress screening tool completed. Accompanied by wife. Future appt information reviewed with pt.

## 2019-10-11 ENCOUNTER — INFUSION (OUTPATIENT)
Dept: INFUSION THERAPY | Facility: HOSPITAL | Age: 49
End: 2019-10-11
Attending: PHYSICIAN ASSISTANT
Payer: COMMERCIAL

## 2019-10-11 VITALS
SYSTOLIC BLOOD PRESSURE: 110 MMHG | TEMPERATURE: 98 F | DIASTOLIC BLOOD PRESSURE: 56 MMHG | OXYGEN SATURATION: 97 % | RESPIRATION RATE: 17 BRPM | HEART RATE: 84 BPM

## 2019-10-11 DIAGNOSIS — G35 MS (MULTIPLE SCLEROSIS): Primary | ICD-10-CM

## 2019-10-11 PROCEDURE — 96375 TX/PRO/DX INJ NEW DRUG ADDON: CPT

## 2019-10-11 PROCEDURE — 25000003 PHARM REV CODE 250: Performed by: PHYSICIAN ASSISTANT

## 2019-10-11 PROCEDURE — S0028 INJECTION, FAMOTIDINE, 20 MG: HCPCS | Performed by: PHYSICIAN ASSISTANT

## 2019-10-11 PROCEDURE — 63600175 PHARM REV CODE 636 W HCPCS: Performed by: PHYSICIAN ASSISTANT

## 2019-10-11 PROCEDURE — 96415 CHEMO IV INFUSION ADDL HR: CPT

## 2019-10-11 PROCEDURE — 96367 TX/PROPH/DG ADDL SEQ IV INF: CPT

## 2019-10-11 PROCEDURE — 96413 CHEMO IV INFUSION 1 HR: CPT

## 2019-10-11 RX ORDER — HEPARIN 100 UNIT/ML
500 SYRINGE INTRAVENOUS
Status: CANCELLED | OUTPATIENT
Start: 2020-03-13

## 2019-10-11 RX ORDER — EPINEPHRINE 0.3 MG/.3ML
0.3 INJECTION SUBCUTANEOUS
Status: CANCELLED | OUTPATIENT
Start: 2020-03-13

## 2019-10-11 RX ORDER — FAMOTIDINE 10 MG/ML
20 INJECTION INTRAVENOUS
Status: CANCELLED | OUTPATIENT
Start: 2020-03-13

## 2019-10-11 RX ORDER — DIPHENHYDRAMINE HYDROCHLORIDE 50 MG/ML
50 INJECTION INTRAMUSCULAR; INTRAVENOUS
Status: CANCELLED | OUTPATIENT
Start: 2020-03-13

## 2019-10-11 RX ORDER — SODIUM CHLORIDE 0.9 % (FLUSH) 0.9 %
10 SYRINGE (ML) INJECTION
Status: CANCELLED | OUTPATIENT
Start: 2020-03-13

## 2019-10-11 RX ORDER — ACETAMINOPHEN 500 MG
1000 TABLET ORAL
Status: CANCELLED | OUTPATIENT
Start: 2020-03-13

## 2019-10-11 RX ORDER — FAMOTIDINE 10 MG/ML
20 INJECTION INTRAVENOUS
Status: COMPLETED | OUTPATIENT
Start: 2019-10-11 | End: 2019-10-11

## 2019-10-11 RX ORDER — ACETAMINOPHEN 500 MG
1000 TABLET ORAL
Status: COMPLETED | OUTPATIENT
Start: 2019-10-11 | End: 2019-10-11

## 2019-10-11 RX ADMIN — FAMOTIDINE 20 MG: 10 INJECTION INTRAVENOUS at 09:10

## 2019-10-11 RX ADMIN — DEXTROSE: 50 INJECTION, SOLUTION INTRAVENOUS at 09:10

## 2019-10-11 RX ADMIN — DIPHENHYDRAMINE HYDROCHLORIDE 50 MG: 50 INJECTION INTRAMUSCULAR; INTRAVENOUS at 08:10

## 2019-10-11 RX ADMIN — ACETAMINOPHEN 1000 MG: 500 TABLET ORAL at 08:10

## 2019-10-11 RX ADMIN — OCRELIZUMAB 300 MG: 300 INJECTION INTRAVENOUS at 09:10

## 2019-10-11 NOTE — PLAN OF CARE
Pt presented for 300 mg Ocrevus infusion 2/2. VSS throughout treatment. Pt reported fatigue and heat intolerance. Pt denied any adverse effects after first dose of Ocrevus; only complaint after last infusion was tiredness from the Benadryl. Premedicated with PO Tylenol, IVPB Benadryl, IVP Pepcid, and IVPB Solumedrol. Ocrevus titrated per order, without issue. Tolerated Ocrevus. Monitored for 1 hr post infusion with no reactions noted. Accompanied by wife. Breakfast and lunch provided. Distress screening tool completed. Ambulated independently into and out of unit. Future appt information reviewed with pt.

## 2019-10-21 ENCOUNTER — CLINICAL SUPPORT (OUTPATIENT)
Dept: INFECTIOUS DISEASES | Facility: CLINIC | Age: 49
End: 2019-10-21
Payer: COMMERCIAL

## 2019-10-21 DIAGNOSIS — Z79.899 LONG TERM CURRENT USE OF IMMUNOSUPPRESSIVE DRUG: ICD-10-CM

## 2019-10-21 PROCEDURE — 99999 PR PBB SHADOW E&M-EST. PATIENT-LVL II: ICD-10-PCS | Mod: PBBFAC,,,

## 2019-10-21 PROCEDURE — 90471 PNEUMOCOCCAL POLYSACCHARIDE VACCINE 23-VALENT =>2YO SQ IM: ICD-10-PCS | Mod: S$GLB,,, | Performed by: INTERNAL MEDICINE

## 2019-10-21 PROCEDURE — 99999 PR PBB SHADOW E&M-EST. PATIENT-LVL II: CPT | Mod: PBBFAC,,,

## 2019-10-21 PROCEDURE — 90732 PNEUMOCOCCAL POLYSACCHARIDE VACCINE 23-VALENT =>2YO SQ IM: ICD-10-PCS | Mod: S$GLB,,, | Performed by: INTERNAL MEDICINE

## 2019-10-21 PROCEDURE — 90732 PPSV23 VACC 2 YRS+ SUBQ/IM: CPT | Mod: S$GLB,,, | Performed by: INTERNAL MEDICINE

## 2019-10-21 PROCEDURE — 90471 IMMUNIZATION ADMIN: CPT | Mod: S$GLB,,, | Performed by: INTERNAL MEDICINE

## 2019-10-21 NOTE — PROGRESS NOTES
Patient received Pneumovax 23 vaccine tolerated well. Instructed to remain in facility for 15 minutes and return if feelings of reaction occur. VIS sheet given. Left in NAD

## 2019-11-26 ENCOUNTER — OFFICE VISIT (OUTPATIENT)
Dept: NEUROLOGY | Facility: CLINIC | Age: 49
End: 2019-11-26
Payer: COMMERCIAL

## 2019-11-26 VITALS
WEIGHT: 159.63 LBS | HEART RATE: 69 BPM | HEIGHT: 69 IN | BODY MASS INDEX: 23.64 KG/M2 | SYSTOLIC BLOOD PRESSURE: 115 MMHG | DIASTOLIC BLOOD PRESSURE: 82 MMHG

## 2019-11-26 DIAGNOSIS — Z71.89 COUNSELING REGARDING GOALS OF CARE: ICD-10-CM

## 2019-11-26 DIAGNOSIS — Z79.899 ENCOUNTER FOR LONG-TERM (CURRENT) USE OF HIGH-RISK MEDICATION: ICD-10-CM

## 2019-11-26 DIAGNOSIS — G35 MULTIPLE SCLEROSIS: Primary | ICD-10-CM

## 2019-11-26 DIAGNOSIS — D84.9 IMMUNOSUPPRESSION: ICD-10-CM

## 2019-11-26 DIAGNOSIS — E55.9 VITAMIN D INSUFFICIENCY: ICD-10-CM

## 2019-11-26 PROCEDURE — 99999 PR PBB SHADOW E&M-EST. PATIENT-LVL III: ICD-10-PCS | Mod: PBBFAC,,, | Performed by: PHYSICIAN ASSISTANT

## 2019-11-26 PROCEDURE — 99999 PR PBB SHADOW E&M-EST. PATIENT-LVL III: CPT | Mod: PBBFAC,,, | Performed by: PHYSICIAN ASSISTANT

## 2019-11-26 PROCEDURE — 3008F BODY MASS INDEX DOCD: CPT | Mod: CPTII,S$GLB,, | Performed by: PHYSICIAN ASSISTANT

## 2019-11-26 PROCEDURE — 99214 OFFICE O/P EST MOD 30 MIN: CPT | Mod: S$GLB,,, | Performed by: PHYSICIAN ASSISTANT

## 2019-11-26 PROCEDURE — 3008F PR BODY MASS INDEX (BMI) DOCUMENTED: ICD-10-PCS | Mod: CPTII,S$GLB,, | Performed by: PHYSICIAN ASSISTANT

## 2019-11-26 PROCEDURE — 99214 PR OFFICE/OUTPT VISIT, EST, LEVL IV, 30-39 MIN: ICD-10-PCS | Mod: S$GLB,,, | Performed by: PHYSICIAN ASSISTANT

## 2019-11-26 RX ORDER — KETOCONAZOLE 20 MG/ML
SHAMPOO, SUSPENSION TOPICAL
Refills: 4 | COMMUNITY
Start: 2019-10-29 | End: 2023-11-28

## 2019-11-26 NOTE — PROGRESS NOTES
Subjective:        Patient ID: Helio Nolasco is a 49 y.o. male who presents today for a routine clinic visit for MS.      MS HPI:  · DMT: Ocrevus(first two doses completed)-9/27 and 10/11/2019  · Side effects from DMT? No  · Taking vitamin D3 as recommended? Yes - 2,000IU/d   · Patient states he did very well with the infusion, but he states was very sleepy after the benadryl.  · Mee has seen ID and in process of completing all vaccines     Medications:  Current Outpatient Medications   Medication Sig    calcium carbonate-vit D3-min 600 mg calcium- 400 unit Tab Take 1 tablet by mouth once daily.    cetirizine (ZYRTEC) 10 MG tablet Take 10 mg by mouth as needed.     cholecalciferol, vitamin D3, (VITAMIN D3) 5,000 unit Tab Take 5,000 Units by mouth continuous prn. Take 5,000 units 4x a week    cyanocobalamin 2000 MCG tablet Take 1,000 mcg by mouth once daily.     escitalopram (LEXAPRO) 10 MG tablet Take 10 mg by mouth every evening.     FOLIC ACID/MV,FE,OTHER MIN (CENTRUM ORAL) Take by mouth once daily.     latanoprost 0.005 % ophthalmic solution Place 1 drop into both eyes every evening.    TECFIDERA 120 mg CpDR TAKE 1 CAPSULE BY MOUTH TWICE DAILY. (Patient not taking: Reported on 9/27/2019)     No current facility-administered medications for this visit.        SOCIAL HISTORY  Social History     Tobacco Use    Smoking status: Current Some Day Smoker     Types: Cigars    Smokeless tobacco: Never Used    Tobacco comment: smokes a cigar once a week   Substance Use Topics    Alcohol use: Yes     Alcohol/week: 0.0 standard drinks     Comment: socially    Drug use: No       Living arrangements - the patient lives with their spouse.    MS ROS:    MS REVIEW OF SYMPTOMS 11/19/2019   Do you feel abnormally tired on most days? No   Do you feel you generally sleep well? Yes   Do you have difficulty controlling your bladder?  No   Do you have difficulty controlling your bowels?  No   Do you have frequent  muscle cramps, tightness or spasms in your limbs?  No   Do you have new visual symptoms?  No   Do you have worsening difficulty with your memory or thinking? No   Do you have worsening symptoms of anxiety or depression?  No   For patients who walk, Do you have more difficulty walking?  No   Have you fallen since your last visit?  No   For patients who use wheelchairs: Do you have any skin wounds or breakdown? Not Applicable   Do you have difficulty using your hands?  No   Do you have shooting or burning pain? No   Do you have difficulty with sexual function?  No   If you are sexually active, are you using birth control? Y/N  N/A Not Applicable   Do you often choke when swallowing liquids or solid food?  No   Do you experience worsening symptoms when overheated? Yes   Do you need any new equipment such as a wheelchair, walker or shower chair? No   Do you receive co-pay financial assistance for your principal MS medicine? Yes   Would you be interested in participating in an MS research trial in the future? No   Do you feel you have adequate family/friend support?  Yes   Do you have health insurance?   Yes   Are you currently employed? Yes   Do you receive SSDI/SSI?  Not Applicable   Do you use marijuana or cannabis products? No   Have you been diagnosed with a urinary tract infection since your last visit here? No   Have you been diagnosed with a respiratory tract infection since your last visit here? No   Have you been to the emergency room since your last visit here? No   Have you been hospitalized since your last visit here?  No     FSS SCORE & INTERPRETATION 8/6/2019   FSS SCORE  10   FSS SCORE INTERPRETATION May not be suffering from fatigue     MS AUDRA-D SCORE & INTERPRETATION 8/6/2019   AUDRA-D SCORE  12   AUDRA-D INTERPRETATION  No indication of Depression     MS RICARDO-7 SCORE & INTERPRETATION 8/6/2019   RICARDO-7 SCORE  0   RICARDO-7 SCORE INTERPRETATION Normal     PEQ MS MOS PAIN EFFECTS SCORE & INTERPRETATION 8/6/2019    PES SCORE 6   PES SCORE INTERPRETATION Scores can range from 6-30.  Items are scaled so that higher scores indicate a greater impact of pain on a patients mood and behavior.     PEQ MS SEXUAL SATISFACTION SCORE & INTERPRETATION 8/6/2019   SSS SCORE  13   SSS SCORE INTERPRETATION Scores can range from 4-24.  Higher scores indicate greater problems with sexual satisfaction.     MS BLADDER CONTROL SCORE & INTERPRETATION 8/6/2019   BLCS SCORE 0   BLCS SCORE INTERPRETATION  Scores can range from 0-22, with higher scores indicating greater bladder control problems.     MS BOWEL CONTROL SCORE & INTERPRETATION 8/6/2019   BWCS SCORE 0   BWCS SCORE INTERPRETATION Scores can range from 0-26, with higher scores indicating greater bowel control problems.     PEQ MS IMPACT OF VISUAL IMPAIRMENT SCORE & INTERPRETATION 8/6/2019   MANNY SCALE SCORE  0   MANNY SCORE INTERPRETATION Scores can range from 0-15, with higher scores indicating greater impact of visual problems on daily activites.     MS PDQ SCORE & INTERPRETATION 8/6/2019   PDQ RETROSPECTIVE MEMORY SUBSCALE 6   PDQ ATTENTION/CONCENTRATION SUBSCALE 5   PDQ PROSPECTIVE MEMORY SUBSCALE 3   PDQ PLANNING/ORGANIZATION SUBSCALE 4   PDQ TOTAL SCORE 18   PDQ SCORE INTERPRETATION Scores can range from 0-80, with higher scores indicating greater perceived cognitive impairment.     MSSS SCORE & INTERPRETATION 8/6/2019   MSSS TANGIBLE SUPPORT SUBSCALE 93.75   MSSS EMOTIONAL/INFORMATIONAL SUPPORT SUBSCALE 100   MSSS AFFECTIONATE SUPPORT SUBSCALE 100   MSSS POSITIVE SOCIAL INTERACTION SUBSCALE 100   MSSS TOTAL SCORE 98.44   MSSS SCORE INTERPRETATION Scores can range from 0-100, with higher scores indicating greater perceived support.                Objective:        1. 25 foot timed walk:  Timed 25 Foot Walk: 3/3/2017 11/26/2019   Did patient wear an AFO? No No   Was assistive device used? No No   Time for 25 Foot Walk (seconds) 3.5 3.5   Time for 25 Foot Walk (seconds) 3.2 3.5          Neurologic Exam     Mental Status   Oriented to person, place, and time.   Follows 3 step commands.   Speech: speech is normal   Level of consciousness: alert  Normal comprehension.     Cranial Nerves     CN II   Visual acuity: (20/30  OD; 20/25OS with Snellen hand held chart at 6 ft)    CN III, IV, VI   Pupils are equal, round, and reactive to light.  Extraocular motions are normal.     CN V   Facial sensation intact.     CN VII   Facial expression full, symmetric.     CN VIII   Hearing: intact (finger rub)    CN IX, X   Palate: symmetric    CN XI   CN XI normal.     CN XII   Tongue deviation: none    Motor Exam   Muscle bulk: normal  Right arm tone: normal  Left arm tone: normal  Right leg tone: increased  Left leg tone: increased    Strength   Right deltoid: 5/5  Left deltoid: 5/5  Right triceps: 5/5  Left triceps: 5/5  Right wrist extension: 5/5  Left wrist extension: 5/5  Right interossei: 5/5  Left interossei: 5/5  Right iliopsoas: 5/5  Left iliopsoas: 5/5  Right hamstrin/5  Left hamstrin/5  Right anterior tibial: 5/5  Left anterior tibial: 5/5  Right peroneal: 5/5  Left peroneal: 5/5    Sensory Exam   Light touch normal.   Right arm vibration: normal  Left arm vibration: normal  Right leg vibration: decreased from toes  Left leg vibration: decreased from toes    Gait, Coordination, and Reflexes     Gait  Gait: normal    Coordination   Finger to nose coordination: normal  Tandem walking coordination: normal (no loss of balance, but slightly unsteady)    Tremor   Resting tremor: absent  Action tremor: absent    Reflexes   Right brachioradialis: 3+  Left brachioradialis: 3+  Right biceps: 3+  Left biceps: 3+  Right triceps: 3+  Left triceps: 3+  Right patellar: 3+  Left patellar: 3+  Right achilles: 3+  Left achilles: 3+  Right plantar: normal  Left plantar: normal  Right ankle clonus: absent  Left ankle clonus: absent  Right pendular knee jerk: absent  Left pendular knee jerk: absentNormal  Heel/toe walk  Normal RSM         Imaging:     No results found for this or any previous visit.  No results found for this or any previous visit.  No results found for this or any previous visit.  No results found for this or any previous visit.  No results found for this or any previous visit.  No results found for this or any previous visit.  Results for orders placed during the hospital encounter of 11/02/17   MRI Brain W WO Contrast    Narrative Procedure: MRI the brain with andwithout contrast.    Technique: Sagittal flair T2, axial T1, axial gradient and axial diffusion imaging of the whole brain pre-contrast.  Post contrast axial T2, axial T1, axial T2 flair and coronal thin sections oral gradient imaging of the whole brain. 7 mL of gadavist contrast was injected intravenously.            Comparison: 04/06/2015    Findings: Continue to several small sized foci of T2 flair signal hyperintensity within the supratentorial parenchyma most concentrated in the periventricular white matter along the margin of the lateral ventricles. There is a small lesion in the left anterior body of the corpus callosum. There is no corresponding diffusion restriction or enhancement these foci. There is no midline shift or mass effect.    The ventricles are stable without hydrocephalus. There is no midline shift or mass effect. There is slight diffusion hyperintensity associated with the lesions within the margin of the splenium of the corpus callosum which are unchanged.    The ventricles are stable without hydrocephalus. There is no abnormal parenchymal susceptibility to suggest rectal hemorrhage.    There is no definite new lesion. No definite infratentorial lesions. Continued T2 flair signal hyperintensities along the undersurface of the splenium of the corpus callosum    The major intracranial T2 flow voids are present. No significant T1 signal hypointensity associated with T2 flair lesions.    Impression No significant change  from prior. Continued few scattered foci of T2 flair signal hyperintense lesions supratentorial parenchyma remains most compatible with mild degree of prior demyelinating plaque burden.    There is no definite new lesion or enhancing lesion to suggest interval or active demyelination.     Clinical correlation and continued followup advised      Electronically signed by: JOSÉ CARSON DO  Date:     11/02/17  Time:    09:06          Labs:     Lab Results   Component Value Date    TTSCOEZW98TU 54 07/20/2019    RMASGOZX82VM 51 05/01/2018    RPIIDSJL28NU 82 11/15/2016     No results found for: JCVINDEX, JCVANTIBODY  Lab Results   Component Value Date    FL6CADBW 55.1 08/12/2019    ABSOLUTECD3 546 (L) 08/12/2019    QD9GFWOC 15.4 08/12/2019    ABSOLUTECD8 153 (L) 08/12/2019    XM9QMBIG 39.2 08/12/2019    ABSOLUTECD4 389 08/12/2019    LABCD48 2.54 08/12/2019     Lab Results   Component Value Date    WBC 2.63 (L) 08/12/2019    RBC 5.12 08/12/2019    HGB 14.9 08/12/2019    HCT 45.1 08/12/2019    MCV 88 08/12/2019    MCH 29.1 08/12/2019    MCHC 33.0 08/12/2019    RDW 12.2 08/12/2019     08/12/2019    MPV 9.4 08/12/2019    GRAN 1.3 (L) 08/12/2019    GRAN 51.3 08/12/2019    LYMPH 1.0 08/12/2019    LYMPH 36.5 08/12/2019    MONO 0.2 (L) 08/12/2019    MONO 8.0 08/12/2019    EOS 0.1 08/12/2019    BASO 0.01 08/12/2019    EOSINOPHIL 3.8 08/12/2019    BASOPHIL 0.4 08/12/2019     Sodium   Date Value Ref Range Status   08/12/2019 140 136 - 145 mmol/L Final     Potassium   Date Value Ref Range Status   08/12/2019 4.1 3.5 - 5.1 mmol/L Final     Chloride   Date Value Ref Range Status   08/12/2019 105 95 - 110 mmol/L Final     CO2   Date Value Ref Range Status   08/12/2019 26 23 - 29 mmol/L Final     Glucose   Date Value Ref Range Status   08/12/2019 102 70 - 110 mg/dL Final     BUN, Bld   Date Value Ref Range Status   08/12/2019 14 6 - 20 mg/dL Final     Creatinine   Date Value Ref Range Status   08/12/2019 1.0 0.5 - 1.4 mg/dL Final      Calcium   Date Value Ref Range Status   08/12/2019 9.3 8.7 - 10.5 mg/dL Final     Total Protein   Date Value Ref Range Status   08/12/2019 7.2 6.0 - 8.4 g/dL Final     Albumin   Date Value Ref Range Status   08/12/2019 4.2 3.5 - 5.2 g/dL Final     Total Bilirubin   Date Value Ref Range Status   08/12/2019 0.7 0.1 - 1.0 mg/dL Final     Comment:     For infants and newborns, interpretation of results should be based  on gestational age, weight and in agreement with clinical  observations.  Premature Infant recommended reference ranges:  Up to 24 hours.............<8.0 mg/dL  Up to 48 hours............<12.0 mg/dL  3-5 days..................<15.0 mg/dL  6-29 days.................<15.0 mg/dL       Alkaline Phosphatase   Date Value Ref Range Status   08/12/2019 65 55 - 135 U/L Final     AST   Date Value Ref Range Status   08/12/2019 21 10 - 40 U/L Final     ALT   Date Value Ref Range Status   08/12/2019 23 10 - 44 U/L Final     Anion Gap   Date Value Ref Range Status   08/12/2019 9 8 - 16 mmol/L Final     eGFR if    Date Value Ref Range Status   08/12/2019 >60 >60 mL/min/1.73 m^2 Final     eGFR if non    Date Value Ref Range Status   08/12/2019 >60 >60 mL/min/1.73 m^2 Final     Comment:     Calculation used to obtain the estimated glomerular filtration  rate (eGFR) is the CKD-EPI equation.           Lab Results   Component Value Date    HEPBSAG Negative 08/12/2019    HEPBSAB Negative 08/12/2019    HEPBCAB Negative 08/12/2019         Impression:       Labs reviewed: N/A  Imaging tests reviewed: N/A    Diagnosis of MS: Yes            Diagnosed in 2006 and initially placed on Rebif, then Tecfidera(discontinued due to lymphopenia), now recently completed first two doses of Ocrevus        MS Diagnosis based on:  Clinical attacks (relapses): 1  Objective Clinical Evidence of lesions: >2  MRI distribution in space: MS typical regions with >1 T2 lesion:  Periventricular, Juxtacortical and  Spinal Cord  CSF: Positive            2006      Progression:  Number of relapses in the past year:  0  Clinical Progression:  Clinically Stable            Last MRI in 2017, unable to complete Brain MRI earlier this year due to financial constraints(did not qualify for assist through [facility])    Plan:            DMT changed to Ocrevus in light of lymphopenia on Tecfidera(stopped end of July 2019). Just completed first two doses of Ocrevus.    Monitoring labs ordered?:  Yes            Screening labs for Ocrevus ordered today-due in March 2020   Imaging labs ordered?:  Yes            Will reschedule previously ordered MRI Brain for March 2020    Additional Impression:    In process of completing all appropriate immunizations with ID.       Over 50% of this 30 minute visit was spent in direct face to face counseling of the patient about MS, DMT considerations, and MS symptom management.     Problem List Items Addressed This Visit        Neuro    Multiple sclerosis - Primary    Relevant Orders    CBC auto differential    Hepatitis B core antibody, total    Hepatitis B surface antibody    Hepatitis B surface antigen    Rituxan Sensitivity       Other    Encounter for long-term (current) use of high-risk medication    Relevant Orders    CBC auto differential    Hepatitis B core antibody, total    Hepatitis B surface antibody    Hepatitis B surface antigen    Rituxan Sensitivity    Counseling regarding goals of care      Other Visit Diagnoses     Immunosuppression        Vitamin D insufficiency        2,000IU/d        Follow up in about 4 months (around 3/26/2020) for follow up with Dr. Junior.  Patient agreed to POC today.    Attending, Dr. Junior, was available during today's encounter.     Radha Silveira PA-C  [facility]

## 2020-02-12 ENCOUNTER — TELEPHONE (OUTPATIENT)
Dept: NEUROLOGY | Facility: CLINIC | Age: 50
End: 2020-02-12

## 2020-02-20 ENCOUNTER — CLINICAL SUPPORT (OUTPATIENT)
Dept: INFECTIOUS DISEASES | Facility: CLINIC | Age: 50
End: 2020-02-20
Payer: COMMERCIAL

## 2020-02-20 DIAGNOSIS — Z79.899 LONG TERM CURRENT USE OF IMMUNOSUPPRESSIVE DRUG: ICD-10-CM

## 2020-02-20 PROCEDURE — 90632 HEPATITIS A VACCINE ADULT IM: ICD-10-PCS | Mod: S$GLB,,, | Performed by: INTERNAL MEDICINE

## 2020-02-20 PROCEDURE — 90471 HEPATITIS A VACCINE ADULT IM: ICD-10-PCS | Mod: S$GLB,,, | Performed by: INTERNAL MEDICINE

## 2020-02-20 PROCEDURE — 90471 IMMUNIZATION ADMIN: CPT | Mod: S$GLB,,, | Performed by: INTERNAL MEDICINE

## 2020-02-20 PROCEDURE — 90632 HEPA VACCINE ADULT IM: CPT | Mod: S$GLB,,, | Performed by: INTERNAL MEDICINE

## 2020-03-09 ENCOUNTER — LAB VISIT (OUTPATIENT)
Dept: LAB | Facility: HOSPITAL | Age: 50
End: 2020-03-09
Payer: COMMERCIAL

## 2020-03-09 DIAGNOSIS — G35 MULTIPLE SCLEROSIS: ICD-10-CM

## 2020-03-09 DIAGNOSIS — Z79.899 ENCOUNTER FOR LONG-TERM (CURRENT) USE OF HIGH-RISK MEDICATION: ICD-10-CM

## 2020-03-09 LAB
BASOPHILS # BLD AUTO: 0.02 K/UL (ref 0–0.2)
BASOPHILS NFR BLD: 0.7 % (ref 0–1.9)
DIFFERENTIAL METHOD: ABNORMAL
EOSINOPHIL # BLD AUTO: 0.2 K/UL (ref 0–0.5)
EOSINOPHIL NFR BLD: 6.4 % (ref 0–8)
ERYTHROCYTE [DISTWIDTH] IN BLOOD BY AUTOMATED COUNT: 11.7 % (ref 11.5–14.5)
HBV CORE AB SERPL QL IA: NEGATIVE
HBV SURFACE AB SER-ACNC: POSITIVE M[IU]/ML
HBV SURFACE AG SERPL QL IA: NEGATIVE
HCT VFR BLD AUTO: 46.1 % (ref 40–54)
HGB BLD-MCNC: 14.7 G/DL (ref 14–18)
IMM GRANULOCYTES # BLD AUTO: 0.01 K/UL (ref 0–0.04)
IMM GRANULOCYTES NFR BLD AUTO: 0.4 % (ref 0–0.5)
LYMPHOCYTES # BLD AUTO: 0.9 K/UL (ref 1–4.8)
LYMPHOCYTES NFR BLD: 33.3 % (ref 18–48)
MCH RBC QN AUTO: 28.9 PG (ref 27–31)
MCHC RBC AUTO-ENTMCNC: 31.9 G/DL (ref 32–36)
MCV RBC AUTO: 91 FL (ref 82–98)
MONOCYTES # BLD AUTO: 0.3 K/UL (ref 0.3–1)
MONOCYTES NFR BLD: 9.6 % (ref 4–15)
NEUTROPHILS # BLD AUTO: 1.4 K/UL (ref 1.8–7.7)
NEUTROPHILS NFR BLD: 49.6 % (ref 38–73)
NRBC BLD-RTO: 0 /100 WBC
PLATELET # BLD AUTO: 256 K/UL (ref 150–350)
PMV BLD AUTO: 9.6 FL (ref 9.2–12.9)
RBC # BLD AUTO: 5.08 M/UL (ref 4.6–6.2)
WBC # BLD AUTO: 2.82 K/UL (ref 3.9–12.7)

## 2020-03-09 PROCEDURE — 36415 COLL VENOUS BLD VENIPUNCTURE: CPT

## 2020-03-09 PROCEDURE — 87340 HEPATITIS B SURFACE AG IA: CPT

## 2020-03-09 PROCEDURE — 86704 HEP B CORE ANTIBODY TOTAL: CPT

## 2020-03-09 PROCEDURE — 86706 HEP B SURFACE ANTIBODY: CPT

## 2020-03-09 PROCEDURE — 85025 COMPLETE CBC W/AUTO DIFF WBC: CPT

## 2020-03-09 PROCEDURE — 86356 MONONUCLEAR CELL ANTIGEN: CPT

## 2020-03-13 ENCOUNTER — HOSPITAL ENCOUNTER (OUTPATIENT)
Dept: RADIOLOGY | Facility: HOSPITAL | Age: 50
Discharge: HOME | End: 2020-03-13
Attending: CLINICAL NURSE SPECIALIST
Payer: COMMERCIAL

## 2020-03-13 DIAGNOSIS — G35 MULTIPLE SCLEROSIS: ICD-10-CM

## 2020-03-13 LAB — CD20 CELLS NFR SPEC: NORMAL %

## 2020-03-13 PROCEDURE — A9585 GADOBUTROL INJECTION: HCPCS | Performed by: CLINICAL NURSE SPECIALIST

## 2020-03-13 PROCEDURE — 70553 MRI BRAIN STEM W/O & W/DYE: CPT | Mod: 26,,, | Performed by: RADIOLOGY

## 2020-03-13 PROCEDURE — 70553 MRI BRAIN STEM W/O & W/DYE: CPT | Mod: TC

## 2020-03-13 PROCEDURE — 25500020 PHARM REV CODE 255: Performed by: CLINICAL NURSE SPECIALIST

## 2020-03-13 PROCEDURE — 70553 MRI BRAIN DEMYELINATING W/ WO CONTRAST: ICD-10-PCS | Mod: 26,,, | Performed by: RADIOLOGY

## 2020-03-13 RX ORDER — GADOBUTROL 604.72 MG/ML
8 INJECTION INTRAVENOUS
Status: COMPLETED | OUTPATIENT
Start: 2020-03-13 | End: 2020-03-13

## 2020-03-13 RX ADMIN — GADOBUTROL 8 ML: 604.72 INJECTION INTRAVENOUS at 12:03

## 2020-03-15 ENCOUNTER — TELEPHONE (OUTPATIENT)
Dept: NEUROLOGY | Facility: CLINIC | Age: 50
End: 2020-03-15

## 2020-03-16 NOTE — TELEPHONE ENCOUNTER
----- Message from Radha Silveira PA-C sent at 3/13/2020 11:12 AM CDT -----  Hep B core, total-negative  Hep B surface antibody-positive  Hep B surface antigen-negative  0 % lymphocytes express CD19/20  ALC-900, ANC-1400, WBC-2.82  Ocrevus due in April-has upcoming Appt with Dr. Junoir 3/31

## 2020-03-19 ENCOUNTER — PATIENT MESSAGE (OUTPATIENT)
Dept: NEUROLOGY | Facility: CLINIC | Age: 50
End: 2020-03-19

## 2020-03-31 ENCOUNTER — TELEPHONE (OUTPATIENT)
Dept: NEUROLOGY | Facility: CLINIC | Age: 50
End: 2020-03-31

## 2020-03-31 ENCOUNTER — OFFICE VISIT (OUTPATIENT)
Dept: NEUROLOGY | Facility: CLINIC | Age: 50
End: 2020-03-31
Payer: COMMERCIAL

## 2020-03-31 DIAGNOSIS — G35 MULTIPLE SCLEROSIS: Primary | ICD-10-CM

## 2020-03-31 DIAGNOSIS — Z71.89 COUNSELING REGARDING GOALS OF CARE: ICD-10-CM

## 2020-03-31 DIAGNOSIS — D84.9 IMMUNOSUPPRESSION: ICD-10-CM

## 2020-03-31 PROCEDURE — 99214 PR OFFICE/OUTPT VISIT, EST, LEVL IV, 30-39 MIN: ICD-10-PCS | Mod: GT,,, | Performed by: PSYCHIATRY & NEUROLOGY

## 2020-03-31 PROCEDURE — 99214 OFFICE O/P EST MOD 30 MIN: CPT | Mod: GT,,, | Performed by: PSYCHIATRY & NEUROLOGY

## 2020-03-31 NOTE — PROGRESS NOTES
Subjective:          Patient ID: Helio Nolasco is a 49 y.o. male who presents today for a routine video visit for MS.      MS HPI:  · DMT: ocrelizumab--October /April  · Side effects from DMT? No  · Taking vitamin D3 as recommended?   · Feeling stable  No sense of worsening;   Not working right now (COVID)  but being paid;   Practicing social distancing    Medications:  Current Outpatient Medications   Medication Sig    calcium carbonate-vit D3-min 600 mg calcium- 400 unit Tab Take 1 tablet by mouth once daily.    cetirizine (ZYRTEC) 10 MG tablet Take 10 mg by mouth as needed.     cholecalciferol, vitamin D3, (VITAMIN D3) 5,000 unit Tab Take 2,000 Units by mouth once daily.     cyanocobalamin 2000 MCG tablet Take 1,000 mcg by mouth once daily.     escitalopram (LEXAPRO) 10 MG tablet Take 10 mg by mouth every evening.     FOLIC ACID/MV,FE,OTHER MIN (CENTRUM ORAL) Take by mouth once daily.     ketoconazole (NIZORAL) 2 % shampoo USE TO WASH BODY IN SHOWER WEEKLY    latanoprost 0.005 % ophthalmic solution Place 1 drop into both eyes every evening.     No current facility-administered medications for this visit.        SOCIAL HISTORY  Social History     Tobacco Use    Smoking status: Current Some Day Smoker     Types: Cigars    Smokeless tobacco: Never Used    Tobacco comment: smokes a cigar once a week   Substance Use Topics    Alcohol use: Yes     Alcohol/week: 0.0 standard drinks     Comment: socially    Drug use: No       Living arrangements - the patient lives with their family.      REVIEW OF SYMPTOMS 3/24/2020   Do you feel abnormally tired on most days? No   Do you feel you generally sleep well? Yes   Do you have difficulty controlling your bladder?  No   Do you have difficulty controlling your bowels?  No   Do you have frequent muscle cramps, tightness or spasms in your limbs?  No   Do you have new visual symptoms?  No   Do you have worsening difficulty with your memory or thinking? No   Do you  have worsening symptoms of anxiety or depression?  No   For patients who walk, Do you have more difficulty walking?  No   Have you fallen since your last visit?  No   For patients who use wheelchairs: Do you have any skin wounds or breakdown? Not Applicable   Do you have difficulty using your hands?  No   Do you have shooting or burning pain? No   Do you have difficulty with sexual function?  No   If you are sexually active, are you using birth control? Y/N  N/A Not Applicable   Do you often choke when swallowing liquids or solid food?  No   Do you experience worsening symptoms when overheated? Yes   Do you need any new equipment such as a wheelchair, walker or shower chair? No   Do you receive co-pay financial assistance for your principal MS medicine? Yes   Would you be interested in participating in an MS research trial in the future? No   For patients on Gilenya, Tecfidera, Aubagio, Rituxan, Ocrevus, Tysabri, Lemtrada or Methotrexate, are you aware that you should NOT receive live virus vaccines?  Yes   Do you feel you have adequate family/friend support?  Yes   Do you have health insurance?   Yes   Are you currently employed? Yes   Do you receive SSDI/SSI?  Not Applicable   Do you use marijuana or cannabis products? No   Have you been diagnosed with a urinary tract infection since your last visit here? No   Have you been diagnosed with a respiratory tract infection since your last visit here? No   Have you been to the emergency room since your last visit here? No   Have you been hospitalized since your last visit here?  No             Imaging:     No results found for this or any previous visit.  No results found for this or any previous visit.  No results found for this or any previous visit.  Results for orders placed during the hospital encounter of 03/13/20   MRI Brain Demyelinating W W/O Contrast    Impression Stable, scattered T2/FLAIR hyperintense lesions throughout the supratentorial white matter,  medulla, and upper cervical cord in keeping with patient's reported history of multiple sclerosis.  No evidence of new or ongoing demyelination.    Electronically signed by resident: Eric Hackett  Date:    03/13/2020  Time:    13:14    Electronically signed by: Thiago Larose MD  Date:    03/13/2020  Time:    14:46     No results found for this or any previous visit.  No results found for this or any previous visit.      Labs:     Lab Results   Component Value Date    NMPCCOGP47YZ 54 07/20/2019    FKDIWJMF63WE 51 05/01/2018    CIHQGHUB53RW 82 11/15/2016     No results found for: JCVINDEX, JCVANTIBODY  Lab Results   Component Value Date    EB2IVXSF 55.1 08/12/2019    ABSOLUTECD3 546 (L) 08/12/2019    PS3IUHPM 15.4 08/12/2019    ABSOLUTECD8 153 (L) 08/12/2019    JT4XGIZB 39.2 08/12/2019    ABSOLUTECD4 389 08/12/2019    LABCD48 2.54 08/12/2019     Lab Results   Component Value Date    WBC 2.82 (L) 03/09/2020    RBC 5.08 03/09/2020    HGB 14.7 03/09/2020    HCT 46.1 03/09/2020    MCV 91 03/09/2020    MCH 28.9 03/09/2020    MCHC 31.9 (L) 03/09/2020    RDW 11.7 03/09/2020     03/09/2020    MPV 9.6 03/09/2020    GRAN 1.4 (L) 03/09/2020    GRAN 49.6 03/09/2020    LYMPH 0.9 (L) 03/09/2020    LYMPH 33.3 03/09/2020    MONO 0.3 03/09/2020    MONO 9.6 03/09/2020    EOS 0.2 03/09/2020    BASO 0.02 03/09/2020    EOSINOPHIL 6.4 03/09/2020    BASOPHIL 0.7 03/09/2020     Sodium   Date Value Ref Range Status   08/12/2019 140 136 - 145 mmol/L Final     Potassium   Date Value Ref Range Status   08/12/2019 4.1 3.5 - 5.1 mmol/L Final     Chloride   Date Value Ref Range Status   08/12/2019 105 95 - 110 mmol/L Final     CO2   Date Value Ref Range Status   08/12/2019 26 23 - 29 mmol/L Final     Glucose   Date Value Ref Range Status   08/12/2019 102 70 - 110 mg/dL Final     BUN, Bld   Date Value Ref Range Status   08/12/2019 14 6 - 20 mg/dL Final     Creatinine   Date Value Ref Range Status   08/12/2019 1.0 0.5 - 1.4 mg/dL Final      Calcium   Date Value Ref Range Status   08/12/2019 9.3 8.7 - 10.5 mg/dL Final     Total Protein   Date Value Ref Range Status   08/12/2019 7.2 6.0 - 8.4 g/dL Final     Albumin   Date Value Ref Range Status   08/12/2019 4.2 3.5 - 5.2 g/dL Final     Total Bilirubin   Date Value Ref Range Status   08/12/2019 0.7 0.1 - 1.0 mg/dL Final     Comment:     For infants and newborns, interpretation of results should be based  on gestational age, weight and in agreement with clinical  observations.  Premature Infant recommended reference ranges:  Up to 24 hours.............<8.0 mg/dL  Up to 48 hours............<12.0 mg/dL  3-5 days..................<15.0 mg/dL  6-29 days.................<15.0 mg/dL       Alkaline Phosphatase   Date Value Ref Range Status   08/12/2019 65 55 - 135 U/L Final     AST   Date Value Ref Range Status   08/12/2019 21 10 - 40 U/L Final     ALT   Date Value Ref Range Status   08/12/2019 23 10 - 44 U/L Final     Anion Gap   Date Value Ref Range Status   08/12/2019 9 8 - 16 mmol/L Final     eGFR if    Date Value Ref Range Status   08/12/2019 >60 >60 mL/min/1.73 m^2 Final     eGFR if non    Date Value Ref Range Status   08/12/2019 >60 >60 mL/min/1.73 m^2 Final     Comment:     Calculation used to obtain the estimated glomerular filtration  rate (eGFR) is the CKD-EPI equation.        Lab Results   Component Value Date    HEPBSAG Negative 03/09/2020    HEPBSAB Positive (A) 03/09/2020    HEPBCAB Negative 03/09/2020           MS Impression and Plan:     NEURO MULTIPLE SCLEROSIS IMPRESSION:   MS Status:     Number of relapses in the past year?:  0    Clinical Progression:  Clinically Stable    MRI Progression:  Stable  Plan:     DMT:  No change in management    DMT comment:  We discussed her immunosuppressed status and the risk of increased form of illness should she contract COVID-19 and pt expressed understanding.  The importance of social distancing, extra precautions as  recommended by NMSS, and hygiene guidelines were emphasized.       Symptom Management:  No change in symptom management    F/u 5 mo Radha Silveira PA-C      Our visit today lasted 30 minutes, and 100% of this time was spent face to face with the patient. Over 50% of this visit included discussion of the treatment plan/medication changes/symptom management/exam findings/imaging results/coordination of care. The patient agrees with the plan of care.    Problem List Items Addressed This Visit        Unprioritized    Counseling regarding goals of care    Multiple sclerosis - Primary      Other Visit Diagnoses     Immunosuppression              Carmenza Junior MD

## 2020-03-31 NOTE — TELEPHONE ENCOUNTER
----- Message from Carmenza Junior MD sent at 3/31/2020  1:57 PM CDT -----  S  will proceed with ocrevus in April  Discussed [facility] pt  [facility]

## 2020-04-06 ENCOUNTER — INFUSION (OUTPATIENT)
Dept: INFUSION THERAPY | Facility: HOSPITAL | Age: 50
End: 2020-04-06
Attending: PSYCHIATRY & NEUROLOGY
Payer: COMMERCIAL

## 2020-04-06 VITALS
DIASTOLIC BLOOD PRESSURE: 59 MMHG | TEMPERATURE: 98 F | OXYGEN SATURATION: 100 % | SYSTOLIC BLOOD PRESSURE: 117 MMHG | HEART RATE: 81 BPM | RESPIRATION RATE: 18 BRPM

## 2020-04-06 DIAGNOSIS — G35 MS (MULTIPLE SCLEROSIS): Primary | ICD-10-CM

## 2020-04-06 PROCEDURE — 96415 CHEMO IV INFUSION ADDL HR: CPT

## 2020-04-06 PROCEDURE — 25000003 PHARM REV CODE 250: Performed by: PHYSICIAN ASSISTANT

## 2020-04-06 PROCEDURE — 63600175 PHARM REV CODE 636 W HCPCS: Performed by: PHYSICIAN ASSISTANT

## 2020-04-06 PROCEDURE — 96375 TX/PRO/DX INJ NEW DRUG ADDON: CPT

## 2020-04-06 PROCEDURE — 96413 CHEMO IV INFUSION 1 HR: CPT

## 2020-04-06 PROCEDURE — 63600175 PHARM REV CODE 636 W HCPCS: Performed by: PSYCHIATRY & NEUROLOGY

## 2020-04-06 PROCEDURE — 96367 TX/PROPH/DG ADDL SEQ IV INF: CPT

## 2020-04-06 PROCEDURE — S0028 INJECTION, FAMOTIDINE, 20 MG: HCPCS | Performed by: PHYSICIAN ASSISTANT

## 2020-04-06 RX ORDER — EPINEPHRINE 0.3 MG/.3ML
0.3 INJECTION SUBCUTANEOUS
Status: CANCELLED | OUTPATIENT
Start: 2020-08-03

## 2020-04-06 RX ORDER — ACETAMINOPHEN 500 MG
1000 TABLET ORAL
Status: COMPLETED | OUTPATIENT
Start: 2020-04-06 | End: 2020-04-06

## 2020-04-06 RX ORDER — HEPARIN 100 UNIT/ML
500 SYRINGE INTRAVENOUS
Status: CANCELLED | OUTPATIENT
Start: 2020-08-03

## 2020-04-06 RX ORDER — SODIUM CHLORIDE 0.9 % (FLUSH) 0.9 %
10 SYRINGE (ML) INJECTION
Status: CANCELLED | OUTPATIENT
Start: 2020-08-03

## 2020-04-06 RX ORDER — FAMOTIDINE 10 MG/ML
20 INJECTION INTRAVENOUS
Status: CANCELLED | OUTPATIENT
Start: 2020-08-03

## 2020-04-06 RX ORDER — DIPHENHYDRAMINE HYDROCHLORIDE 50 MG/ML
50 INJECTION INTRAMUSCULAR; INTRAVENOUS
Status: CANCELLED | OUTPATIENT
Start: 2020-08-03

## 2020-04-06 RX ORDER — FAMOTIDINE 10 MG/ML
20 INJECTION INTRAVENOUS
Status: COMPLETED | OUTPATIENT
Start: 2020-04-06 | End: 2020-04-06

## 2020-04-06 RX ORDER — ACETAMINOPHEN 500 MG
1000 TABLET ORAL
Status: CANCELLED | OUTPATIENT
Start: 2020-08-03

## 2020-04-06 RX ADMIN — DIPHENHYDRAMINE HYDROCHLORIDE 50 MG: 50 INJECTION INTRAMUSCULAR; INTRAVENOUS at 08:04

## 2020-04-06 RX ADMIN — OCRELIZUMAB 600 MG: 300 INJECTION INTRAVENOUS at 09:04

## 2020-04-06 RX ADMIN — ACETAMINOPHEN 1000 MG: 500 TABLET ORAL at 08:04

## 2020-04-06 RX ADMIN — DEXTROSE: 50 INJECTION, SOLUTION INTRAVENOUS at 09:04

## 2020-04-06 RX ADMIN — FAMOTIDINE 20 MG: 10 INJECTION INTRAVENOUS at 08:04

## 2020-04-06 NOTE — PLAN OF CARE
Patient arrived to unit for Ocrevus infusion. Patient denies any new or worsening symptoms at this time. Plan of care reviewed, patient agreeable to plan. Patient tolerated infusion well. 1 hour post observation completed. VSS. Discharge instructions reviewed, patient instructed to return in 6 months. Patient ambulated unassisted off unit, in NAD at time of discharge.

## 2020-04-17 ENCOUNTER — DOCUMENTATION ONLY (OUTPATIENT)
Dept: NEUROLOGY | Facility: CLINIC | Age: 50
End: 2020-04-17

## 2020-05-15 ENCOUNTER — PATIENT MESSAGE (OUTPATIENT)
Dept: NEUROLOGY | Facility: CLINIC | Age: 50
End: 2020-05-15

## 2020-05-18 ENCOUNTER — OFFICE VISIT (OUTPATIENT)
Dept: NEUROLOGY | Facility: CLINIC | Age: 50
End: 2020-05-18
Payer: COMMERCIAL

## 2020-05-18 DIAGNOSIS — Z71.89 COUNSELING REGARDING GOALS OF CARE: ICD-10-CM

## 2020-05-18 DIAGNOSIS — G35 MULTIPLE SCLEROSIS: Primary | ICD-10-CM

## 2020-05-18 DIAGNOSIS — D84.9 IMMUNOSUPPRESSION: ICD-10-CM

## 2020-05-18 PROCEDURE — 99214 PR OFFICE/OUTPT VISIT, EST, LEVL IV, 30-39 MIN: ICD-10-PCS | Mod: 95,,, | Performed by: PSYCHIATRY & NEUROLOGY

## 2020-05-18 PROCEDURE — 99214 OFFICE O/P EST MOD 30 MIN: CPT | Mod: 95,,, | Performed by: PSYCHIATRY & NEUROLOGY

## 2020-05-18 NOTE — Clinical Note
"D, Pt scheduled a fit in today to discuss return to work concerns. He's on Interventional Spine;  Works in building maintenance.  He's been at home since March (his employer had not wanted him to work since then b/c he's immunosuppressed), but they have been paying him.  He wanted to talk about what to do if he needs to go back (or wants to go back to work).  So we discussed the federal guidelines (blanket recs), the Ocrevus data, etc.  I told him that in my opinion, should he return to work and practice social distancing, hand hygeine, masking, etc, I don't think that would be inordinately risky, but that there is no guarantee.  He says that if he does return to work, he'll eventually need a letter from me "clearing" him.  Right now, he wants to stay at home to see whether there will be a second spike.  But if he decides to go, back, he'll send a portal message.  So nothing to do today--all just a heads up. Thanks D! B   "

## 2020-05-18 NOTE — PROGRESS NOTES
Subjective:          The patient location is: [place]/ [place]  The chief complaint leading to consultation is: MS  Visit type: Virtual visit with synchronous audio and video  Total time spent with patient: 25minutes  Each patient to whom he or she provides medical services by telemedicine is:  (1) informed of the relationship between the physician and patient and the respective role of any other health care provider with respect to management of the patient; and (2) notified that he or she may decline to receive medical services by telemedicine and may withdraw from such care at any time.      Patient ID: Helio Nolasco is a 49 y.o. male who presents today for a fit in video visit for MS.      MS HPI:  · DMT: ocrelizumab  · Side effects from DMT? No  · Taking vitamin D3 as recommended? 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  Wondering about when/how/if he might return to work.     Medications:  Current Outpatient Medications   Medication Sig    calcium carbonate-vit D3-min 600 mg calcium- 400 unit Tab Take 1 tablet by mouth once daily.    cetirizine (ZYRTEC) 10 MG tablet Take 10 mg by mouth as needed.     cholecalciferol, vitamin D3, (VITAMIN D3) 5,000 unit Tab Take 2,000 Units by mouth once daily.     cyanocobalamin 2000 MCG tablet Take 1,000 mcg by mouth once daily.     escitalopram (LEXAPRO) 10 MG tablet Take 10 mg by mouth every evening.     FOLIC ACID/MV,FE,OTHER MIN (CENTRUM ORAL) Take by mouth once daily.     ketoconazole (NIZORAL) 2 % shampoo USE TO WASH BODY IN SHOWER WEEKLY    latanoprost 0.005 % ophthalmic solution Place 1 drop into both eyes every evening.     No current facility-administered medications for this visit.        SOCIAL HISTORY  Social History     Tobacco Use    Smoking status: Current Some Day Smoker     Types: Cigars    Smokeless tobacco: Never Used    Tobacco comment: smokes a cigar once a week   Substance  Use Topics    Alcohol use: Yes     Alcohol/week: 0.0 standard drinks     Comment: socially    Drug use: No       Living arrangements - the patient lives with their spouse.          Imaging:       Results for orders placed during the hospital encounter of 03/13/20   MRI Brain Demyelinating W W/O Contrast    Impression Stable, scattered T2/FLAIR hyperintense lesions throughout the supratentorial white matter, medulla, and upper cervical cord in keeping with patient's reported history of multiple sclerosis.  No evidence of new or ongoing demyelination.    Electronically signed by resident: Eric Hackett  Date:    03/13/2020  Time:    13:14    Electronically signed by: Thiago Larose MD  Date:    03/13/2020  Time:    14:46     No results found for this or any previous visit.  No results found for this or any previous visit.      Labs:     Lab Results   Component Value Date    CCUGTYAE87HA 54 07/20/2019    RBXDYHEE36FM 51 05/01/2018    NWQVKPWV81VK 82 11/15/2016     No results found for: JCVINDEX, JCVANTIBODY  Lab Results   Component Value Date    ZT3CKKUS 55.1 08/12/2019    ABSOLUTECD3 546 (L) 08/12/2019    LS0NVRQR 15.4 08/12/2019    ABSOLUTECD8 153 (L) 08/12/2019    UD7DTTWR 39.2 08/12/2019    ABSOLUTECD4 389 08/12/2019    LABCD48 2.54 08/12/2019     Lab Results   Component Value Date    WBC 2.82 (L) 03/09/2020    RBC 5.08 03/09/2020    HGB 14.7 03/09/2020    HCT 46.1 03/09/2020    MCV 91 03/09/2020    MCH 28.9 03/09/2020    MCHC 31.9 (L) 03/09/2020    RDW 11.7 03/09/2020     03/09/2020    MPV 9.6 03/09/2020    GRAN 1.4 (L) 03/09/2020    GRAN 49.6 03/09/2020    LYMPH 0.9 (L) 03/09/2020    LYMPH 33.3 03/09/2020    MONO 0.3 03/09/2020    MONO 9.6 03/09/2020    EOS 0.2 03/09/2020    BASO 0.02 03/09/2020    EOSINOPHIL 6.4 03/09/2020    BASOPHIL 0.7 03/09/2020     Sodium   Date Value Ref Range Status   08/12/2019 140 136 - 145 mmol/L Final     Potassium   Date Value Ref Range Status   08/12/2019 4.1 3.5 - 5.1  mmol/L Final     Chloride   Date Value Ref Range Status   08/12/2019 105 95 - 110 mmol/L Final     CO2   Date Value Ref Range Status   08/12/2019 26 23 - 29 mmol/L Final     Glucose   Date Value Ref Range Status   08/12/2019 102 70 - 110 mg/dL Final     BUN, Bld   Date Value Ref Range Status   08/12/2019 14 6 - 20 mg/dL Final     Creatinine   Date Value Ref Range Status   08/12/2019 1.0 0.5 - 1.4 mg/dL Final     Calcium   Date Value Ref Range Status   08/12/2019 9.3 8.7 - 10.5 mg/dL Final     Total Protein   Date Value Ref Range Status   08/12/2019 7.2 6.0 - 8.4 g/dL Final     Albumin   Date Value Ref Range Status   08/12/2019 4.2 3.5 - 5.2 g/dL Final     Total Bilirubin   Date Value Ref Range Status   08/12/2019 0.7 0.1 - 1.0 mg/dL Final     Comment:     For infants and newborns, interpretation of results should be based  on gestational age, weight and in agreement with clinical  observations.  Premature Infant recommended reference ranges:  Up to 24 hours.............<8.0 mg/dL  Up to 48 hours............<12.0 mg/dL  3-5 days..................<15.0 mg/dL  6-29 days.................<15.0 mg/dL       Alkaline Phosphatase   Date Value Ref Range Status   08/12/2019 65 55 - 135 U/L Final     AST   Date Value Ref Range Status   08/12/2019 21 10 - 40 U/L Final     ALT   Date Value Ref Range Status   08/12/2019 23 10 - 44 U/L Final     Anion Gap   Date Value Ref Range Status   08/12/2019 9 8 - 16 mmol/L Final     eGFR if    Date Value Ref Range Status   08/12/2019 >60 >60 mL/min/1.73 m^2 Final     eGFR if non    Date Value Ref Range Status   08/12/2019 >60 >60 mL/min/1.73 m^2 Final     Comment:     Calculation used to obtain the estimated glomerular filtration  rate (eGFR) is the CKD-EPI equation.        Lab Results   Component Value Date    HEPBSAG Negative 03/09/2020    HEPBSAB Positive (A) 03/09/2020    HEPBCAB Negative 03/09/2020           MS Impression and Plan:     NEURO MULTIPLE  SCLEROSIS IMPRESSION:   MS Status:     Number of relapses in the past year?:  0    Clinical Progression:  Clinically Stable    MRI Progression:  Stable  Plan:     DMT:  No change in management    Symptom Management:  No change in symptom management     Next Imaging Due: 3/18/2021     Majority of visit today was spent discussing current dtata about return to work give IS status.  COVID outcomes in people on Ocrevus ( mostly), etc.  He will continue to think about return to work and will let us know how he wants to proceed in a few weeks.           Our visit today lasted 25 minutes, and 100% of this time was spent face to face with the patient. Over 50% of this visit included discussion of the treatment plan/medication changes/symptom management/exam findings/imaging results/coordination of care. The patient agrees with the plan of care.    Problem List Items Addressed This Visit     None          Carmenza Junior MD

## 2020-05-27 ENCOUNTER — PATIENT MESSAGE (OUTPATIENT)
Dept: NEUROLOGY | Facility: CLINIC | Age: 50
End: 2020-05-27

## 2020-06-04 NOTE — TELEPHONE ENCOUNTER
Emailed return to work letter to pt and pt's supervisor, per his request and approval of Dr. Junior.

## 2020-08-31 DIAGNOSIS — G35 MULTIPLE SCLEROSIS: Primary | ICD-10-CM

## 2020-09-01 ENCOUNTER — LAB VISIT (OUTPATIENT)
Dept: LAB | Facility: HOSPITAL | Age: 50
End: 2020-09-01
Attending: FAMILY MEDICINE
Payer: COMMERCIAL

## 2020-09-01 DIAGNOSIS — G35 MULTIPLE SCLEROSIS: ICD-10-CM

## 2020-09-01 LAB
BASOPHILS # BLD AUTO: 0.04 K/UL (ref 0–0.2)
BASOPHILS NFR BLD: 1.3 % (ref 0–1.9)
DIFFERENTIAL METHOD: ABNORMAL
EOSINOPHIL # BLD AUTO: 0.1 K/UL (ref 0–0.5)
EOSINOPHIL NFR BLD: 4.4 % (ref 0–8)
ERYTHROCYTE [DISTWIDTH] IN BLOOD BY AUTOMATED COUNT: 12 % (ref 11.5–14.5)
HCT VFR BLD AUTO: 47.8 % (ref 40–54)
HGB BLD-MCNC: 14.7 G/DL (ref 14–18)
IMM GRANULOCYTES # BLD AUTO: 0.02 K/UL (ref 0–0.04)
IMM GRANULOCYTES NFR BLD AUTO: 0.6 % (ref 0–0.5)
LYMPHOCYTES # BLD AUTO: 1 K/UL (ref 1–4.8)
LYMPHOCYTES NFR BLD: 32.3 % (ref 18–48)
MCH RBC QN AUTO: 28.8 PG (ref 27–31)
MCHC RBC AUTO-ENTMCNC: 30.8 G/DL (ref 32–36)
MCV RBC AUTO: 94 FL (ref 82–98)
MONOCYTES # BLD AUTO: 0.3 K/UL (ref 0.3–1)
MONOCYTES NFR BLD: 10.1 % (ref 4–15)
NEUTROPHILS # BLD AUTO: 1.6 K/UL (ref 1.8–7.7)
NEUTROPHILS NFR BLD: 51.3 % (ref 38–73)
NRBC BLD-RTO: 0 /100 WBC
PLATELET # BLD AUTO: 291 K/UL (ref 150–350)
PMV BLD AUTO: 10.1 FL (ref 9.2–12.9)
RBC # BLD AUTO: 5.1 M/UL (ref 4.6–6.2)
WBC # BLD AUTO: 3.16 K/UL (ref 3.9–12.7)

## 2020-09-01 PROCEDURE — 86706 HEP B SURFACE ANTIBODY: CPT

## 2020-09-01 PROCEDURE — 86356 MONONUCLEAR CELL ANTIGEN: CPT

## 2020-09-01 PROCEDURE — 36415 COLL VENOUS BLD VENIPUNCTURE: CPT

## 2020-09-01 PROCEDURE — 86704 HEP B CORE ANTIBODY TOTAL: CPT

## 2020-09-01 PROCEDURE — 87340 HEPATITIS B SURFACE AG IA: CPT

## 2020-09-01 PROCEDURE — 85025 COMPLETE CBC W/AUTO DIFF WBC: CPT

## 2020-09-02 LAB
HBV CORE AB SERPL QL IA: NEGATIVE
HBV SURFACE AB SER-ACNC: POSITIVE M[IU]/ML
HBV SURFACE AG SERPL QL IA: NEGATIVE

## 2020-09-03 ENCOUNTER — TELEPHONE (OUTPATIENT)
Dept: NEUROLOGY | Facility: CLINIC | Age: 50
End: 2020-09-03

## 2020-09-03 ENCOUNTER — OFFICE VISIT (OUTPATIENT)
Dept: NEUROLOGY | Facility: CLINIC | Age: 50
End: 2020-09-03
Payer: COMMERCIAL

## 2020-09-03 ENCOUNTER — LAB VISIT (OUTPATIENT)
Dept: LAB | Facility: HOSPITAL | Age: 50
End: 2020-09-03
Payer: COMMERCIAL

## 2020-09-03 VITALS
HEART RATE: 70 BPM | WEIGHT: 161.06 LBS | SYSTOLIC BLOOD PRESSURE: 123 MMHG | TEMPERATURE: 98 F | DIASTOLIC BLOOD PRESSURE: 72 MMHG | BODY MASS INDEX: 23.86 KG/M2 | HEIGHT: 69 IN

## 2020-09-03 DIAGNOSIS — R68.89 HEAT SENSITIVITY: ICD-10-CM

## 2020-09-03 DIAGNOSIS — D84.9 IMMUNOSUPPRESSION: ICD-10-CM

## 2020-09-03 DIAGNOSIS — Z79.899 ENCOUNTER FOR LONG-TERM (CURRENT) USE OF HIGH-RISK MEDICATION: ICD-10-CM

## 2020-09-03 DIAGNOSIS — E55.9 VITAMIN D INSUFFICIENCY: ICD-10-CM

## 2020-09-03 DIAGNOSIS — R53.83 FATIGUE, UNSPECIFIED TYPE: ICD-10-CM

## 2020-09-03 DIAGNOSIS — G35 MULTIPLE SCLEROSIS: ICD-10-CM

## 2020-09-03 DIAGNOSIS — Z71.89 COUNSELING REGARDING GOALS OF CARE: ICD-10-CM

## 2020-09-03 DIAGNOSIS — G35 MULTIPLE SCLEROSIS: Primary | ICD-10-CM

## 2020-09-03 LAB — 25(OH)D3+25(OH)D2 SERPL-MCNC: 51 NG/ML (ref 30–96)

## 2020-09-03 PROCEDURE — 3008F PR BODY MASS INDEX (BMI) DOCUMENTED: ICD-10-PCS | Mod: CPTII,S$GLB,, | Performed by: PHYSICIAN ASSISTANT

## 2020-09-03 PROCEDURE — 82306 VITAMIN D 25 HYDROXY: CPT

## 2020-09-03 PROCEDURE — 36415 COLL VENOUS BLD VENIPUNCTURE: CPT

## 2020-09-03 PROCEDURE — 3008F BODY MASS INDEX DOCD: CPT | Mod: CPTII,S$GLB,, | Performed by: PHYSICIAN ASSISTANT

## 2020-09-03 PROCEDURE — 99215 OFFICE O/P EST HI 40 MIN: CPT | Mod: S$GLB,,, | Performed by: PHYSICIAN ASSISTANT

## 2020-09-03 PROCEDURE — 99999 PR PBB SHADOW E&M-EST. PATIENT-LVL III: CPT | Mod: PBBFAC,,, | Performed by: PHYSICIAN ASSISTANT

## 2020-09-03 PROCEDURE — 99999 PR PBB SHADOW E&M-EST. PATIENT-LVL III: ICD-10-PCS | Mod: PBBFAC,,, | Performed by: PHYSICIAN ASSISTANT

## 2020-09-03 PROCEDURE — 99215 PR OFFICE/OUTPT VISIT, EST, LEVL V, 40-54 MIN: ICD-10-PCS | Mod: S$GLB,,, | Performed by: PHYSICIAN ASSISTANT

## 2020-09-03 NOTE — PROGRESS NOTES
Subjective:          Patient ID: Helio Nolasco is a 49 y.o. male who presents today for a routine clinic visit for MS.  Last seen by [facility] in May 2020 by Dr. Junior.     MS HPI:  · DMT: ocrelizumab-last dosed in April 2020-due in October  · Side effects from DMT? No  · Taking vitamin D3 as recommended? Yes - 5,000IU/d   · Has been having lower back issues and is seeing a chiropractor. It is improving-but he states he is walking cautiously as to not trigger the pain again  · No new neurological symptoms  · Patient has not been ill  · Patient has one instance of tremors that lasted 15 minutes but resolved. Wondering if it might be related to working in the yard.    Medications:  Current Outpatient Medications   Medication Sig    calcium carbonate-vit D3-min 600 mg calcium- 400 unit Tab Take 1 tablet by mouth once daily.    cetirizine (ZYRTEC) 10 MG tablet Take 10 mg by mouth as needed.     cholecalciferol, vitamin D3, (VITAMIN D3) 5,000 unit Tab Take 2,000 Units by mouth once daily.     cyanocobalamin 2000 MCG tablet Take 1,000 mcg by mouth once daily.     escitalopram (LEXAPRO) 10 MG tablet Take 10 mg by mouth every evening.     FOLIC ACID/MV,FE,OTHER MIN (CENTRUM ORAL) Take by mouth once daily.     ketoconazole (NIZORAL) 2 % shampoo USE TO WASH BODY IN SHOWER WEEKLY    latanoprost 0.005 % ophthalmic solution Place 1 drop into both eyes every evening.     No current facility-administered medications for this visit.        SOCIAL HISTORY  Social History     Tobacco Use    Smoking status: Current Some Day Smoker     Types: Cigars    Smokeless tobacco: Never Used    Tobacco comment: smokes a cigar once a week   Substance Use Topics    Alcohol use: Yes     Alcohol/week: 0.0 standard drinks     Comment: socially    Drug use: No       Living arrangements - the patient lives with their spouse.    ROS:    REVIEW OF SYMPTOMS 8/27/2020   Do you feel abnormally tired on most days? No-B-12 for fatigue-works  well for him   Do you feel you generally sleep well? Yes   Do you have difficulty controlling your bladder?  No-limits caffeine to prevent urgency   Do you have difficulty controlling your bowels?  No   Do you have frequent muscle cramps, tightness or spasms in your limbs?  No   Do you have new visual symptoms?  No   Do you have worsening difficulty with your memory or thinking? No   Do you have worsening symptoms of anxiety or depression?  No-stable with Lexapro 10mg   For patients who walk, Do you have more difficulty walking?  No   Have you fallen since your last visit?  No   For patients who use wheelchairs: Do you have any skin wounds or breakdown? Not Applicable   Do you have difficulty using your hands?  No   Do you have shooting or burning pain? No   Do you have difficulty with sexual function?  No   If you are sexually active, are you using birth control? Y/N  N/A Not Applicable   Do you often choke when swallowing liquids or solid food?  No   Do you experience worsening symptoms when overheated? Yes   Do you need any new equipment such as a wheelchair, walker or shower chair? No   Do you receive co-pay financial assistance for your principal MS medicine? Yes   Would you be interested in participating in an MS research trial in the future? No   For patients on Gilenya, Tecfidera, Aubagio, Rituxan, Ocrevus, Tysabri, Lemtrada or Methotrexate, are you aware that you should NOT receive live virus vaccines?  Yes   Do you feel you have adequate family/friend support?  Yes   Do you have health insurance?   Yes   Are you currently employed? Yes   Do you receive SSDI/SSI?  Not Applicable   Do you use marijuana or cannabis products? No   Have you been diagnosed with a urinary tract infection since your last visit here? No   Have you been diagnosed with a respiratory tract infection since your last visit here? No   Have you been to the emergency room since your last visit here? No   Have you been hospitalized since  your last visit here?  No                Objective:        1. 25 foot timed walk:  Timed 25 Foot Walk: 2019 9/3/2020   Did patient wear an AFO? No No   Was assistive device used? No No   Time for 25 Foot Walk (seconds) 3.5 3.9   Time for 25 Foot Walk (seconds) 3.5 4     Neurologic Exam     Mental Status   Oriented to person, place, and time.   Follows 3 step commands.   Speech: speech is normal   Level of consciousness: alert  Normal comprehension.     Cranial Nerves     CN II   Visual acuity: normal with correction (20/20  OD; 20/20 OS with Snellen hand held chart at 6 ft)    CN III, IV, VI   Extraocular motions are normal.     CN V   Facial sensation intact.     CN VII   Facial expression full, symmetric.     CN VIII   Hearing: intact (finger rub)    CN IX, X   Palate: symmetric    CN XI   CN XI normal.     CN XII   Tongue deviation: none    Motor Exam   Muscle bulk: normal  Right arm tone: normal  Left arm tone: normal  Right leg tone: increased  Left leg tone: increased    Strength   Right deltoid: 5/5  Left deltoid: 5/5  Right triceps: 5/5  Left triceps: 5/5  Right wrist extension: 5/5  Left wrist extension: 5/5  Right interossei: 5/5  Left interossei: 5/5  Right iliopsoas: 5/5  Left iliopsoas: 5/5  Right hamstrin/5  Left hamstrin/5  Right anterior tibial: 5/5  Left anterior tibial: 5/5  Right peroneal: 5/5  Left peroneal: 5/5    Sensory Exam   Light touch normal.   Right arm vibration: normal  Left arm vibration: normal  Right leg vibration: decreased from toes  Left leg vibration: decreased from toes    Gait, Coordination, and Reflexes     Gait  Gait: normal    Coordination   Finger to nose coordination: normal  Tandem walking coordination: normal (no loss of balance, but slightly unsteady)    Tremor   Resting tremor: absent  Action tremor: absent    Reflexes   Right brachioradialis: 3+  Left brachioradialis: 3+  Right biceps: 3+  Left biceps: 3+  Right triceps: 3+  Left triceps: 3+  Right  patellar: 3+  Left patellar: 3+  Right achilles: 3+  Left achilles: 3+  Right plantar: normal  Left plantar: normal  Right ankle clonus: absent  Left ankle clonus: absent  Right pendular knee jerk: absent  Left pendular knee jerk: absentNormal Heel/toe walk  Normal RSM         Imaging:     No results found for this or any previous visit.  No results found for this or any previous visit.  No results found for this or any previous visit.  Results for orders placed during the hospital encounter of 03/13/20   MRI Brain Demyelinating W W/O Contrast    Impression Stable, scattered T2/FLAIR hyperintense lesions throughout the supratentorial white matter, medulla, and upper cervical cord in keeping with patient's reported history of multiple sclerosis.  No evidence of new or ongoing demyelination.    Electronically signed by resident: Eric Hackett  Date:    03/13/2020  Time:    13:14    Electronically signed by: Thiago Larose MD  Date:    03/13/2020  Time:    14:46     No results found for this or any previous visit.  No results found for this or any previous visit.      Labs:     Lab Results   Component Value Date    RSZFPEIK16VF 54 07/20/2019    YHMXMKOD44TA 51 05/01/2018    VYRATVWT49LK 82 11/15/2016     No results found for: JCVINDEX, JCVANTIBODY  Lab Results   Component Value Date    VJ4YXVVB 55.1 08/12/2019    ABSOLUTECD3 546 (L) 08/12/2019    BC5REXGF 15.4 08/12/2019    ABSOLUTECD8 153 (L) 08/12/2019    GK8DGITX 39.2 08/12/2019    ABSOLUTECD4 389 08/12/2019    LABCD48 2.54 08/12/2019     Lab Results   Component Value Date    WBC 3.16 (L) 09/01/2020    RBC 5.10 09/01/2020    HGB 14.7 09/01/2020    HCT 47.8 09/01/2020    MCV 94 09/01/2020    MCH 28.8 09/01/2020    MCHC 30.8 (L) 09/01/2020    RDW 12.0 09/01/2020     09/01/2020    MPV 10.1 09/01/2020    GRAN 1.6 (L) 09/01/2020    GRAN 51.3 09/01/2020    LYMPH 1.0 09/01/2020    LYMPH 32.3 09/01/2020    MONO 0.3 09/01/2020    MONO 10.1 09/01/2020    EOS 0.1  09/01/2020    BASO 0.04 09/01/2020    EOSINOPHIL 4.4 09/01/2020    BASOPHIL 1.3 09/01/2020     Sodium   Date Value Ref Range Status   08/12/2019 140 136 - 145 mmol/L Final     Potassium   Date Value Ref Range Status   08/12/2019 4.1 3.5 - 5.1 mmol/L Final     Chloride   Date Value Ref Range Status   08/12/2019 105 95 - 110 mmol/L Final     CO2   Date Value Ref Range Status   08/12/2019 26 23 - 29 mmol/L Final     Glucose   Date Value Ref Range Status   08/12/2019 102 70 - 110 mg/dL Final     BUN, Bld   Date Value Ref Range Status   08/12/2019 14 6 - 20 mg/dL Final     Creatinine   Date Value Ref Range Status   08/12/2019 1.0 0.5 - 1.4 mg/dL Final     Calcium   Date Value Ref Range Status   08/12/2019 9.3 8.7 - 10.5 mg/dL Final     Total Protein   Date Value Ref Range Status   08/12/2019 7.2 6.0 - 8.4 g/dL Final     Albumin   Date Value Ref Range Status   08/12/2019 4.2 3.5 - 5.2 g/dL Final     Total Bilirubin   Date Value Ref Range Status   08/12/2019 0.7 0.1 - 1.0 mg/dL Final     Comment:     For infants and newborns, interpretation of results should be based  on gestational age, weight and in agreement with clinical  observations.  Premature Infant recommended reference ranges:  Up to 24 hours.............<8.0 mg/dL  Up to 48 hours............<12.0 mg/dL  3-5 days..................<15.0 mg/dL  6-29 days.................<15.0 mg/dL       Alkaline Phosphatase   Date Value Ref Range Status   08/12/2019 65 55 - 135 U/L Final     AST   Date Value Ref Range Status   08/12/2019 21 10 - 40 U/L Final     ALT   Date Value Ref Range Status   08/12/2019 23 10 - 44 U/L Final     Anion Gap   Date Value Ref Range Status   08/12/2019 9 8 - 16 mmol/L Final     eGFR if    Date Value Ref Range Status   08/12/2019 >60 >60 mL/min/1.73 m^2 Final     eGFR if non    Date Value Ref Range Status   08/12/2019 >60 >60 mL/min/1.73 m^2 Final     Comment:     Calculation used to obtain the estimated glomerular  filtration  rate (eGFR) is the CKD-EPI equation.        Lab Results   Component Value Date    HEPBSAG Negative 09/01/2020    HEPBSAB Positive (A) 09/01/2020    HEPBCAB Negative 09/01/2020           MS Impression and Plan:     NEURO MULTIPLE SCLEROSIS IMPRESSION:   MS Status:     Number of relapses in the past year?:  0    Clinical Progression:  Clinically Stable    Clinical Progression comment:  Timed walk is slightly slower, but I feel this is due to his back pain and hesitancy    MRI Progression:  Stable    MRI Progression comment:  Stable in March 2020  Plan:     DMT:  No change in management    DMT comment:  Continue Ocrevus-due in October. Labs reviewed. MRI stable in March 2020. The patient was counseled about the risks associated with immune suppressive therapy, including a higher risk of serious infections, as well as the importance of avoiding all live virus vaccines while on immune suppressive medication.       Symptom Management:  No change in symptom management     Next Imaging Due: 3/3/2022     Next Labs Due: 3/3/2021    Our visit today lasted 40 minutes, and 100% of this time was spent face to face with the patient. Over 50% of this visit included discussion of the treatment plan/medication changes/symptom management/exam findings/imaging results/coordination of care.     Problem List Items Addressed This Visit        Neuro    Multiple sclerosis - Primary     Stable on Ocrevus-next infusion due October 2020         Relevant Orders    Vitamin D       Immunology/Multi System    Immunosuppression       Endocrine    Vitamin D insufficiency     Screening lab ordered today,  data suggests that high circulating blood levels of vitamin D has an ameliorating effect on the disease course in multiple sclerosis in general.             Relevant Orders    Vitamin D       Other    Encounter for long-term (current) use of high-risk medication    Counseling regarding goals of care    Heat sensitivity    Fatigue           Follow up in about 6 months (around 3/3/2021) for follow up with me.  Patient agreed to POC today.    Attending, Dr. Junior, was available during today's encounter.     Radha Silveira PA-C  [facility]

## 2020-09-03 NOTE — TELEPHONE ENCOUNTER
----- Message from Radha Silveira PA-C sent at 9/3/2020  2:55 PM CDT -----  Ok to schedule Ocrevus [facility]

## 2020-09-03 NOTE — ASSESSMENT & PLAN NOTE
Screening lab ordered today,  data suggests that high circulating blood levels of vitamin D has an ameliorating effect on the disease course in multiple sclerosis in general.

## 2020-09-04 LAB
APPEARANCE SPEC: NORMAL
CD20 CELLS NFR SPEC: NORMAL %
SPECIMEN SOURCE: NORMAL
VIABLE CELLS NFR SPEC: 99 %

## 2020-10-23 ENCOUNTER — INFUSION (OUTPATIENT)
Dept: INFUSION THERAPY | Facility: HOSPITAL | Age: 50
End: 2020-10-23
Attending: PSYCHIATRY & NEUROLOGY
Payer: COMMERCIAL

## 2020-10-23 VITALS
RESPIRATION RATE: 17 BRPM | SYSTOLIC BLOOD PRESSURE: 122 MMHG | DIASTOLIC BLOOD PRESSURE: 66 MMHG | HEART RATE: 83 BPM | OXYGEN SATURATION: 99 % | TEMPERATURE: 98 F

## 2020-10-23 DIAGNOSIS — G35 MS (MULTIPLE SCLEROSIS): Primary | ICD-10-CM

## 2020-10-23 PROCEDURE — 25000003 PHARM REV CODE 250: Performed by: PSYCHIATRY & NEUROLOGY

## 2020-10-23 PROCEDURE — 63600175 PHARM REV CODE 636 W HCPCS: Performed by: PSYCHIATRY & NEUROLOGY

## 2020-10-23 PROCEDURE — 96415 CHEMO IV INFUSION ADDL HR: CPT

## 2020-10-23 PROCEDURE — 96375 TX/PRO/DX INJ NEW DRUG ADDON: CPT

## 2020-10-23 PROCEDURE — 96413 CHEMO IV INFUSION 1 HR: CPT

## 2020-10-23 PROCEDURE — 96367 TX/PROPH/DG ADDL SEQ IV INF: CPT

## 2020-10-23 RX ORDER — ACETAMINOPHEN 500 MG
1000 TABLET ORAL
Status: CANCELLED | OUTPATIENT
Start: 2021-02-12

## 2020-10-23 RX ORDER — FAMOTIDINE 10 MG/ML
20 INJECTION INTRAVENOUS
Status: CANCELLED | OUTPATIENT
Start: 2021-02-12

## 2020-10-23 RX ORDER — SODIUM CHLORIDE 0.9 % (FLUSH) 0.9 %
10 SYRINGE (ML) INJECTION
Status: CANCELLED | OUTPATIENT
Start: 2021-02-12

## 2020-10-23 RX ORDER — HEPARIN 100 UNIT/ML
500 SYRINGE INTRAVENOUS
Status: CANCELLED | OUTPATIENT
Start: 2021-02-12

## 2020-10-23 RX ORDER — FAMOTIDINE 10 MG/ML
20 INJECTION INTRAVENOUS
Status: COMPLETED | OUTPATIENT
Start: 2020-10-23 | End: 2020-10-23

## 2020-10-23 RX ORDER — EPINEPHRINE 0.3 MG/.3ML
0.3 INJECTION SUBCUTANEOUS
Status: CANCELLED | OUTPATIENT
Start: 2021-02-12

## 2020-10-23 RX ORDER — ACETAMINOPHEN 500 MG
1000 TABLET ORAL
Status: COMPLETED | OUTPATIENT
Start: 2020-10-23 | End: 2020-10-23

## 2020-10-23 RX ORDER — DIPHENHYDRAMINE HYDROCHLORIDE 50 MG/ML
50 INJECTION INTRAMUSCULAR; INTRAVENOUS
Status: CANCELLED | OUTPATIENT
Start: 2021-02-12

## 2020-10-23 RX ADMIN — DIPHENHYDRAMINE HYDROCHLORIDE 50 MG: 50 INJECTION INTRAMUSCULAR; INTRAVENOUS at 08:10

## 2020-10-23 RX ADMIN — OCRELIZUMAB 600 MG: 300 INJECTION INTRAVENOUS at 09:10

## 2020-10-23 RX ADMIN — FAMOTIDINE 20 MG: 10 INJECTION INTRAVENOUS at 09:10

## 2020-10-23 RX ADMIN — ACETAMINOPHEN 1000 MG: 500 TABLET ORAL at 08:10

## 2020-10-23 RX ADMIN — DEXTROSE: 50 INJECTION, SOLUTION INTRAVENOUS at 09:10

## 2020-10-23 NOTE — PLAN OF CARE
Patient arrived to unit afebrile for Ocrevus 600 mg infusion. No new or worsening symptoms reported today. Plan of care reviewed, patient agreeable to plan. Premeds of Tylenol, Benadryl, Decadron, and Pepcid given prior to Ocrevus. Titration of Ocrevus started at 40 ml per hour and increased by 40 ml every 30 minutes until highest rate reached at 200ml/hour.Patient tolerated infusion well. 60 min post observation completed. VSS. Discharge instructions reviewed, patient instructed to return in April for next Ocrevus. Patient ambulated off unit unassisted by self. Patient in NAD at time of discharge.

## 2020-11-02 ENCOUNTER — PATIENT MESSAGE (OUTPATIENT)
Dept: PSYCHIATRY | Facility: CLINIC | Age: 50
End: 2020-11-02

## 2021-02-05 ENCOUNTER — PATIENT MESSAGE (OUTPATIENT)
Dept: NEUROLOGY | Facility: CLINIC | Age: 51
End: 2021-02-05

## 2021-03-04 ENCOUNTER — TELEPHONE (OUTPATIENT)
Dept: NEUROLOGY | Facility: CLINIC | Age: 51
End: 2021-03-04

## 2021-03-04 ENCOUNTER — OFFICE VISIT (OUTPATIENT)
Dept: NEUROLOGY | Facility: CLINIC | Age: 51
End: 2021-03-04
Payer: COMMERCIAL

## 2021-03-04 VITALS
HEIGHT: 69 IN | DIASTOLIC BLOOD PRESSURE: 78 MMHG | WEIGHT: 168.88 LBS | SYSTOLIC BLOOD PRESSURE: 118 MMHG | TEMPERATURE: 99 F | HEART RATE: 62 BPM | BODY MASS INDEX: 25.01 KG/M2

## 2021-03-04 DIAGNOSIS — Z71.89 COUNSELING REGARDING GOALS OF CARE: ICD-10-CM

## 2021-03-04 DIAGNOSIS — D84.821 IMMUNOSUPPRESSION DUE TO DRUG THERAPY: ICD-10-CM

## 2021-03-04 DIAGNOSIS — Z79.899 IMMUNOSUPPRESSION DUE TO DRUG THERAPY: ICD-10-CM

## 2021-03-04 DIAGNOSIS — F34.1 DYSTHYMIA: ICD-10-CM

## 2021-03-04 DIAGNOSIS — E55.9 VITAMIN D INSUFFICIENCY: ICD-10-CM

## 2021-03-04 DIAGNOSIS — G35 MULTIPLE SCLEROSIS: Primary | ICD-10-CM

## 2021-03-04 PROCEDURE — 99999 PR PBB SHADOW E&M-EST. PATIENT-LVL IV: CPT | Mod: PBBFAC,,, | Performed by: PHYSICIAN ASSISTANT

## 2021-03-04 PROCEDURE — 99215 PR OFFICE/OUTPT VISIT, EST, LEVL V, 40-54 MIN: ICD-10-PCS | Mod: S$GLB,,, | Performed by: PHYSICIAN ASSISTANT

## 2021-03-04 PROCEDURE — 3008F PR BODY MASS INDEX (BMI) DOCUMENTED: ICD-10-PCS | Mod: CPTII,S$GLB,, | Performed by: PHYSICIAN ASSISTANT

## 2021-03-04 PROCEDURE — 1126F AMNT PAIN NOTED NONE PRSNT: CPT | Mod: S$GLB,,, | Performed by: PHYSICIAN ASSISTANT

## 2021-03-04 PROCEDURE — 1126F PR PAIN SEVERITY QUANTIFIED, NO PAIN PRESENT: ICD-10-PCS | Mod: S$GLB,,, | Performed by: PHYSICIAN ASSISTANT

## 2021-03-04 PROCEDURE — 3008F BODY MASS INDEX DOCD: CPT | Mod: CPTII,S$GLB,, | Performed by: PHYSICIAN ASSISTANT

## 2021-03-04 PROCEDURE — 99999 PR PBB SHADOW E&M-EST. PATIENT-LVL IV: ICD-10-PCS | Mod: PBBFAC,,, | Performed by: PHYSICIAN ASSISTANT

## 2021-03-04 PROCEDURE — 99215 OFFICE O/P EST HI 40 MIN: CPT | Mod: S$GLB,,, | Performed by: PHYSICIAN ASSISTANT

## 2021-03-05 NOTE — TELEPHONE ENCOUNTER
----- Message from Radha Sivleira PA-C sent at 3/4/2021  2:58 PM CST -----  Will hold infusion for immunization.he's not due until end of April

## 2021-03-09 ENCOUNTER — PATIENT MESSAGE (OUTPATIENT)
Dept: NEUROLOGY | Facility: CLINIC | Age: 51
End: 2021-03-09

## 2021-03-17 ENCOUNTER — PATIENT MESSAGE (OUTPATIENT)
Dept: NEUROLOGY | Facility: CLINIC | Age: 51
End: 2021-03-17

## 2021-03-31 ENCOUNTER — LAB VISIT (OUTPATIENT)
Dept: LAB | Facility: HOSPITAL | Age: 51
End: 2021-03-31
Attending: PHYSICIAN ASSISTANT
Payer: COMMERCIAL

## 2021-03-31 DIAGNOSIS — G35 MULTIPLE SCLEROSIS: ICD-10-CM

## 2021-03-31 DIAGNOSIS — E55.9 VITAMIN D INSUFFICIENCY: ICD-10-CM

## 2021-03-31 DIAGNOSIS — D84.821 IMMUNOSUPPRESSION DUE TO DRUG THERAPY: ICD-10-CM

## 2021-03-31 DIAGNOSIS — Z79.899 IMMUNOSUPPRESSION DUE TO DRUG THERAPY: ICD-10-CM

## 2021-03-31 LAB
25(OH)D3+25(OH)D2 SERPL-MCNC: 47 NG/ML (ref 30–96)
BASOPHILS # BLD AUTO: 0.03 K/UL (ref 0–0.2)
BASOPHILS NFR BLD: 0.9 % (ref 0–1.9)
DIFFERENTIAL METHOD: ABNORMAL
EOSINOPHIL # BLD AUTO: 0.2 K/UL (ref 0–0.5)
EOSINOPHIL NFR BLD: 6.8 % (ref 0–8)
ERYTHROCYTE [DISTWIDTH] IN BLOOD BY AUTOMATED COUNT: 12 % (ref 11.5–14.5)
HCT VFR BLD AUTO: 44.9 % (ref 40–54)
HGB BLD-MCNC: 14.4 G/DL (ref 14–18)
IMM GRANULOCYTES # BLD AUTO: 0.01 K/UL (ref 0–0.04)
IMM GRANULOCYTES NFR BLD AUTO: 0.3 % (ref 0–0.5)
LYMPHOCYTES # BLD AUTO: 0.9 K/UL (ref 1–4.8)
LYMPHOCYTES NFR BLD: 27.9 % (ref 18–48)
MCH RBC QN AUTO: 29 PG (ref 27–31)
MCHC RBC AUTO-ENTMCNC: 32.1 G/DL (ref 32–36)
MCV RBC AUTO: 91 FL (ref 82–98)
MONOCYTES # BLD AUTO: 0.4 K/UL (ref 0.3–1)
MONOCYTES NFR BLD: 13.1 % (ref 4–15)
NEUTROPHILS # BLD AUTO: 1.7 K/UL (ref 1.8–7.7)
NEUTROPHILS NFR BLD: 51 % (ref 38–73)
NRBC BLD-RTO: 0 /100 WBC
PLATELET # BLD AUTO: 295 K/UL (ref 150–450)
PMV BLD AUTO: 9.8 FL (ref 9.2–12.9)
RBC # BLD AUTO: 4.96 M/UL (ref 4.6–6.2)
WBC # BLD AUTO: 3.37 K/UL (ref 3.9–12.7)

## 2021-03-31 PROCEDURE — 85025 COMPLETE CBC W/AUTO DIFF WBC: CPT | Performed by: PHYSICIAN ASSISTANT

## 2021-03-31 PROCEDURE — 86704 HEP B CORE ANTIBODY TOTAL: CPT | Performed by: PHYSICIAN ASSISTANT

## 2021-03-31 PROCEDURE — 86706 HEP B SURFACE ANTIBODY: CPT | Performed by: PHYSICIAN ASSISTANT

## 2021-03-31 PROCEDURE — 82306 VITAMIN D 25 HYDROXY: CPT | Performed by: PHYSICIAN ASSISTANT

## 2021-03-31 PROCEDURE — 87340 HEPATITIS B SURFACE AG IA: CPT | Performed by: PHYSICIAN ASSISTANT

## 2021-03-31 PROCEDURE — 86356 MONONUCLEAR CELL ANTIGEN: CPT | Performed by: PHYSICIAN ASSISTANT

## 2021-03-31 PROCEDURE — 36415 COLL VENOUS BLD VENIPUNCTURE: CPT | Performed by: PHYSICIAN ASSISTANT

## 2021-04-01 LAB
HBV CORE AB SERPL QL IA: NEGATIVE
HBV SURFACE AB SER-ACNC: NEGATIVE M[IU]/ML
HBV SURFACE AG SERPL QL IA: NEGATIVE

## 2021-04-03 ENCOUNTER — PATIENT MESSAGE (OUTPATIENT)
Dept: NEUROLOGY | Facility: CLINIC | Age: 51
End: 2021-04-03

## 2021-04-03 LAB
APPEARANCE SPEC: NORMAL
CD20 CELLS NFR SPEC: NORMAL %
SPECIMEN SOURCE: NORMAL
VIABLE CELLS NFR SPEC: 95 %

## 2021-04-08 ENCOUNTER — PATIENT MESSAGE (OUTPATIENT)
Dept: NEUROLOGY | Facility: CLINIC | Age: 51
End: 2021-04-08

## 2021-04-08 DIAGNOSIS — G35 MULTIPLE SCLEROSIS: Primary | ICD-10-CM

## 2021-04-08 DIAGNOSIS — D84.821 IMMUNOSUPPRESSION DUE TO DRUG THERAPY: ICD-10-CM

## 2021-04-08 DIAGNOSIS — Z79.899 IMMUNOSUPPRESSION DUE TO DRUG THERAPY: ICD-10-CM

## 2021-04-12 ENCOUNTER — HOSPITAL ENCOUNTER (OUTPATIENT)
Dept: RADIOLOGY | Facility: HOSPITAL | Age: 51
Discharge: HOME | End: 2021-04-12
Attending: PHYSICIAN ASSISTANT
Payer: COMMERCIAL

## 2021-04-12 DIAGNOSIS — G35 MULTIPLE SCLEROSIS: ICD-10-CM

## 2021-04-12 PROCEDURE — 72141 MRI NECK SPINE W/O DYE: CPT | Mod: 26,,, | Performed by: RADIOLOGY

## 2021-04-12 PROCEDURE — 70551 MRI BRAIN DEMYELINATING WITHOUT CONTRAST: ICD-10-PCS | Mod: 26,,, | Performed by: RADIOLOGY

## 2021-04-12 PROCEDURE — 72141 MRI NECK SPINE W/O DYE: CPT | Mod: TC

## 2021-04-12 PROCEDURE — 70551 MRI BRAIN STEM W/O DYE: CPT | Mod: 26,,, | Performed by: RADIOLOGY

## 2021-04-12 PROCEDURE — 72141 MRI CERVICAL SPINE DEMYELINATING WITHOUT CONTRAST: ICD-10-PCS | Mod: 26,,, | Performed by: RADIOLOGY

## 2021-04-12 PROCEDURE — 70551 MRI BRAIN STEM W/O DYE: CPT | Mod: TC

## 2021-04-13 ENCOUNTER — PATIENT MESSAGE (OUTPATIENT)
Dept: NEUROLOGY | Facility: CLINIC | Age: 51
End: 2021-04-13

## 2021-04-30 ENCOUNTER — PATIENT MESSAGE (OUTPATIENT)
Dept: NEUROLOGY | Facility: CLINIC | Age: 51
End: 2021-04-30

## 2021-05-03 ENCOUNTER — LAB VISIT (OUTPATIENT)
Dept: LAB | Facility: HOSPITAL | Age: 51
End: 2021-05-03
Attending: NURSE PRACTITIONER
Payer: COMMERCIAL

## 2021-05-03 DIAGNOSIS — G35 MULTIPLE SCLEROSIS: ICD-10-CM

## 2021-05-03 DIAGNOSIS — D84.821 IMMUNOSUPPRESSION DUE TO DRUG THERAPY: ICD-10-CM

## 2021-05-03 DIAGNOSIS — Z79.899 IMMUNOSUPPRESSION DUE TO DRUG THERAPY: ICD-10-CM

## 2021-05-03 LAB
BASOPHILS # BLD AUTO: 0.03 K/UL (ref 0–0.2)
BASOPHILS NFR BLD: 1.2 % (ref 0–1.9)
DIFFERENTIAL METHOD: ABNORMAL
EOSINOPHIL # BLD AUTO: 0.2 K/UL (ref 0–0.5)
EOSINOPHIL NFR BLD: 7.8 % (ref 0–8)
ERYTHROCYTE [DISTWIDTH] IN BLOOD BY AUTOMATED COUNT: 11.9 % (ref 11.5–14.5)
HCT VFR BLD AUTO: 44.3 % (ref 40–54)
HGB BLD-MCNC: 14.2 G/DL (ref 14–18)
IMM GRANULOCYTES # BLD AUTO: 0 K/UL (ref 0–0.04)
IMM GRANULOCYTES NFR BLD AUTO: 0 % (ref 0–0.5)
LYMPHOCYTES # BLD AUTO: 1 K/UL (ref 1–4.8)
LYMPHOCYTES NFR BLD: 40.8 % (ref 18–48)
MCH RBC QN AUTO: 28.9 PG (ref 27–31)
MCHC RBC AUTO-ENTMCNC: 32.1 G/DL (ref 32–36)
MCV RBC AUTO: 90 FL (ref 82–98)
MONOCYTES # BLD AUTO: 0.3 K/UL (ref 0.3–1)
MONOCYTES NFR BLD: 11.8 % (ref 4–15)
NEUTROPHILS # BLD AUTO: 0.9 K/UL (ref 1.8–7.7)
NEUTROPHILS NFR BLD: 38.4 % (ref 38–73)
NRBC BLD-RTO: 0 /100 WBC
PLATELET # BLD AUTO: 258 K/UL (ref 150–450)
PMV BLD AUTO: 9.7 FL (ref 9.2–12.9)
RBC # BLD AUTO: 4.91 M/UL (ref 4.6–6.2)
WBC # BLD AUTO: 2.45 K/UL (ref 3.9–12.7)

## 2021-05-03 PROCEDURE — 85025 COMPLETE CBC W/AUTO DIFF WBC: CPT | Performed by: PHYSICIAN ASSISTANT

## 2021-05-03 PROCEDURE — 36415 COLL VENOUS BLD VENIPUNCTURE: CPT | Performed by: PHYSICIAN ASSISTANT

## 2021-05-07 ENCOUNTER — INFUSION (OUTPATIENT)
Dept: INFUSION THERAPY | Facility: HOSPITAL | Age: 51
End: 2021-05-07
Attending: PSYCHIATRY & NEUROLOGY
Payer: COMMERCIAL

## 2021-05-07 VITALS
RESPIRATION RATE: 16 BRPM | SYSTOLIC BLOOD PRESSURE: 112 MMHG | TEMPERATURE: 98 F | OXYGEN SATURATION: 98 % | DIASTOLIC BLOOD PRESSURE: 63 MMHG | HEART RATE: 67 BPM

## 2021-05-07 DIAGNOSIS — G35 MS (MULTIPLE SCLEROSIS): Primary | ICD-10-CM

## 2021-05-07 PROCEDURE — 96413 CHEMO IV INFUSION 1 HR: CPT

## 2021-05-07 PROCEDURE — 96367 TX/PROPH/DG ADDL SEQ IV INF: CPT

## 2021-05-07 PROCEDURE — 63600175 PHARM REV CODE 636 W HCPCS: Performed by: PSYCHIATRY & NEUROLOGY

## 2021-05-07 PROCEDURE — 96417 CHEMO IV INFUS EACH ADDL SEQ: CPT

## 2021-05-07 PROCEDURE — 96375 TX/PRO/DX INJ NEW DRUG ADDON: CPT

## 2021-05-07 PROCEDURE — 25000003 PHARM REV CODE 250: Performed by: PSYCHIATRY & NEUROLOGY

## 2021-05-07 RX ORDER — DIPHENHYDRAMINE HYDROCHLORIDE 50 MG/ML
50 INJECTION INTRAMUSCULAR; INTRAVENOUS
Status: CANCELLED | OUTPATIENT
Start: 2021-07-30

## 2021-05-07 RX ORDER — EPINEPHRINE 0.3 MG/.3ML
0.3 INJECTION SUBCUTANEOUS
Status: CANCELLED | OUTPATIENT
Start: 2021-07-30

## 2021-05-07 RX ORDER — EPINEPHRINE 0.3 MG/.3ML
0.3 INJECTION SUBCUTANEOUS
Status: CANCELLED | OUTPATIENT
Start: 2021-05-07

## 2021-05-07 RX ORDER — DIPHENHYDRAMINE HYDROCHLORIDE 50 MG/ML
50 INJECTION INTRAMUSCULAR; INTRAVENOUS
Status: CANCELLED | OUTPATIENT
Start: 2021-05-07

## 2021-05-07 RX ORDER — ACETAMINOPHEN 500 MG
1000 TABLET ORAL
Status: CANCELLED | OUTPATIENT
Start: 2021-07-30

## 2021-05-07 RX ORDER — SODIUM CHLORIDE 0.9 % (FLUSH) 0.9 %
10 SYRINGE (ML) INJECTION
Status: CANCELLED | OUTPATIENT
Start: 2021-07-30

## 2021-05-07 RX ORDER — FAMOTIDINE 10 MG/ML
20 INJECTION INTRAVENOUS
Status: CANCELLED | OUTPATIENT
Start: 2021-07-30

## 2021-05-07 RX ORDER — SODIUM CHLORIDE 0.9 % (FLUSH) 0.9 %
10 SYRINGE (ML) INJECTION
Status: CANCELLED | OUTPATIENT
Start: 2021-05-07

## 2021-05-07 RX ORDER — FAMOTIDINE 10 MG/ML
20 INJECTION INTRAVENOUS
Status: COMPLETED | OUTPATIENT
Start: 2021-05-07 | End: 2021-05-07

## 2021-05-07 RX ORDER — ACETAMINOPHEN 500 MG
1000 TABLET ORAL
Status: COMPLETED | OUTPATIENT
Start: 2021-05-07 | End: 2021-05-07

## 2021-05-07 RX ORDER — HEPARIN 100 UNIT/ML
500 SYRINGE INTRAVENOUS
Status: CANCELLED | OUTPATIENT
Start: 2021-07-30

## 2021-05-07 RX ORDER — HEPARIN 100 UNIT/ML
500 SYRINGE INTRAVENOUS
Status: CANCELLED | OUTPATIENT
Start: 2021-05-07

## 2021-05-07 RX ADMIN — OCRELIZUMAB 600 MG: 300 INJECTION INTRAVENOUS at 09:05

## 2021-05-07 RX ADMIN — DEXTROSE: 50 INJECTION, SOLUTION INTRAVENOUS at 09:05

## 2021-05-07 RX ADMIN — ACETAMINOPHEN 1000 MG: 500 TABLET ORAL at 08:05

## 2021-05-07 RX ADMIN — FAMOTIDINE 20 MG: 10 INJECTION INTRAVENOUS at 08:05

## 2021-05-07 RX ADMIN — DIPHENHYDRAMINE HYDROCHLORIDE 50 MG: 50 INJECTION INTRAMUSCULAR; INTRAVENOUS at 08:05

## 2021-05-31 ENCOUNTER — PATIENT MESSAGE (OUTPATIENT)
Dept: PSYCHIATRY | Facility: CLINIC | Age: 51
End: 2021-05-31

## 2021-08-28 ENCOUNTER — PATIENT MESSAGE (OUTPATIENT)
Dept: NEUROLOGY | Facility: CLINIC | Age: 51
End: 2021-08-28

## 2021-08-28 ENCOUNTER — IMMUNIZATION (OUTPATIENT)
Dept: INTERNAL MEDICINE | Facility: CLINIC | Age: 51
End: 2021-08-28
Payer: COMMERCIAL

## 2021-08-28 DIAGNOSIS — Z23 NEED FOR VACCINATION: Primary | ICD-10-CM

## 2021-08-28 PROCEDURE — 91300 COVID-19, MRNA, LNP-S, PF, 30 MCG/0.3 ML DOSE VACCINE: CPT | Mod: ,,, | Performed by: INTERNAL MEDICINE

## 2021-08-28 PROCEDURE — 0003A COVID-19, MRNA, LNP-S, PF, 30 MCG/0.3 ML DOSE VACCINE: CPT | Mod: CV19,,, | Performed by: INTERNAL MEDICINE

## 2021-08-28 PROCEDURE — 0003A COVID-19, MRNA, LNP-S, PF, 30 MCG/0.3 ML DOSE VACCINE: ICD-10-PCS | Mod: CV19,,, | Performed by: INTERNAL MEDICINE

## 2021-08-28 PROCEDURE — 91300 COVID-19, MRNA, LNP-S, PF, 30 MCG/0.3 ML DOSE VACCINE: ICD-10-PCS | Mod: ,,, | Performed by: INTERNAL MEDICINE

## 2021-09-01 ENCOUNTER — PATIENT MESSAGE (OUTPATIENT)
Dept: PSYCHIATRY | Facility: CLINIC | Age: 51
End: 2021-09-01

## 2021-09-02 ENCOUNTER — PATIENT MESSAGE (OUTPATIENT)
Dept: PSYCHIATRY | Facility: CLINIC | Age: 51
End: 2021-09-02

## 2021-10-07 ENCOUNTER — TELEPHONE (OUTPATIENT)
Dept: NEUROLOGY | Facility: CLINIC | Age: 51
End: 2021-10-07

## 2021-10-07 DIAGNOSIS — G35 MULTIPLE SCLEROSIS: Primary | ICD-10-CM

## 2021-10-12 ENCOUNTER — LAB VISIT (OUTPATIENT)
Dept: LAB | Facility: HOSPITAL | Age: 51
End: 2021-10-12
Attending: PSYCHIATRY & NEUROLOGY
Payer: COMMERCIAL

## 2021-10-12 DIAGNOSIS — G35 MULTIPLE SCLEROSIS: ICD-10-CM

## 2021-10-12 LAB
BASOPHILS # BLD AUTO: 0.03 K/UL (ref 0–0.2)
BASOPHILS NFR BLD: 1.2 % (ref 0–1.9)
DIFFERENTIAL METHOD: ABNORMAL
EOSINOPHIL # BLD AUTO: 0.2 K/UL (ref 0–0.5)
EOSINOPHIL NFR BLD: 8.5 % (ref 0–8)
ERYTHROCYTE [DISTWIDTH] IN BLOOD BY AUTOMATED COUNT: 12.2 % (ref 11.5–14.5)
HBV CORE AB SERPL QL IA: NEGATIVE
HBV SURFACE AB SER-ACNC: NEGATIVE M[IU]/ML
HBV SURFACE AG SERPL QL IA: NEGATIVE
HCT VFR BLD AUTO: 43.3 % (ref 40–54)
HGB BLD-MCNC: 14.6 G/DL (ref 14–18)
IMM GRANULOCYTES # BLD AUTO: 0 K/UL (ref 0–0.04)
IMM GRANULOCYTES NFR BLD AUTO: 0 % (ref 0–0.5)
LYMPHOCYTES # BLD AUTO: 1 K/UL (ref 1–4.8)
LYMPHOCYTES NFR BLD: 39.9 % (ref 18–48)
MCH RBC QN AUTO: 29.4 PG (ref 27–31)
MCHC RBC AUTO-ENTMCNC: 33.7 G/DL (ref 32–36)
MCV RBC AUTO: 87 FL (ref 82–98)
MONOCYTES # BLD AUTO: 0.4 K/UL (ref 0.3–1)
MONOCYTES NFR BLD: 14.9 % (ref 4–15)
NEUTROPHILS # BLD AUTO: 0.9 K/UL (ref 1.8–7.7)
NEUTROPHILS NFR BLD: 35.5 % (ref 38–73)
NRBC BLD-RTO: 0 /100 WBC
PLATELET # BLD AUTO: 272 K/UL (ref 150–450)
PMV BLD AUTO: 9.5 FL (ref 9.2–12.9)
RBC # BLD AUTO: 4.97 M/UL (ref 4.6–6.2)
WBC # BLD AUTO: 2.48 K/UL (ref 3.9–12.7)

## 2021-10-12 PROCEDURE — 86704 HEP B CORE ANTIBODY TOTAL: CPT | Performed by: PSYCHIATRY & NEUROLOGY

## 2021-10-12 PROCEDURE — 86706 HEP B SURFACE ANTIBODY: CPT | Performed by: PSYCHIATRY & NEUROLOGY

## 2021-10-12 PROCEDURE — 85025 COMPLETE CBC W/AUTO DIFF WBC: CPT | Performed by: PSYCHIATRY & NEUROLOGY

## 2021-10-12 PROCEDURE — 87340 HEPATITIS B SURFACE AG IA: CPT | Performed by: PSYCHIATRY & NEUROLOGY

## 2021-10-12 PROCEDURE — 36415 COLL VENOUS BLD VENIPUNCTURE: CPT | Performed by: PSYCHIATRY & NEUROLOGY

## 2021-11-15 ENCOUNTER — OFFICE VISIT (OUTPATIENT)
Dept: NEUROLOGY | Facility: CLINIC | Age: 51
End: 2021-11-15
Payer: COMMERCIAL

## 2021-11-15 ENCOUNTER — LAB VISIT (OUTPATIENT)
Dept: LAB | Facility: HOSPITAL | Age: 51
End: 2021-11-15
Payer: COMMERCIAL

## 2021-11-15 VITALS
SYSTOLIC BLOOD PRESSURE: 116 MMHG | WEIGHT: 162.69 LBS | HEART RATE: 69 BPM | DIASTOLIC BLOOD PRESSURE: 78 MMHG | BODY MASS INDEX: 24.09 KG/M2 | HEIGHT: 69 IN

## 2021-11-15 DIAGNOSIS — G35 MULTIPLE SCLEROSIS: ICD-10-CM

## 2021-11-15 DIAGNOSIS — Z79.899 HIGH RISK MEDICATION USE: ICD-10-CM

## 2021-11-15 DIAGNOSIS — Z29.89 PROPHYLACTIC IMMUNOTHERAPY: ICD-10-CM

## 2021-11-15 DIAGNOSIS — G35 MULTIPLE SCLEROSIS: Primary | ICD-10-CM

## 2021-11-15 DIAGNOSIS — Z71.89 COUNSELING REGARDING GOALS OF CARE: ICD-10-CM

## 2021-11-15 LAB
BASOPHILS # BLD AUTO: 0.05 K/UL (ref 0–0.2)
BASOPHILS NFR BLD: 1.3 % (ref 0–1.9)
DIFFERENTIAL METHOD: ABNORMAL
EOSINOPHIL # BLD AUTO: 0.1 K/UL (ref 0–0.5)
EOSINOPHIL NFR BLD: 3.4 % (ref 0–8)
ERYTHROCYTE [DISTWIDTH] IN BLOOD BY AUTOMATED COUNT: 12.4 % (ref 11.5–14.5)
HCT VFR BLD AUTO: 44.6 % (ref 40–54)
HGB BLD-MCNC: 14.2 G/DL (ref 14–18)
IMM GRANULOCYTES # BLD AUTO: 0.01 K/UL (ref 0–0.04)
IMM GRANULOCYTES NFR BLD AUTO: 0.3 % (ref 0–0.5)
LYMPHOCYTES # BLD AUTO: 1.3 K/UL (ref 1–4.8)
LYMPHOCYTES NFR BLD: 33.9 % (ref 18–48)
MCH RBC QN AUTO: 29 PG (ref 27–31)
MCHC RBC AUTO-ENTMCNC: 31.8 G/DL (ref 32–36)
MCV RBC AUTO: 91 FL (ref 82–98)
MONOCYTES # BLD AUTO: 0.6 K/UL (ref 0.3–1)
MONOCYTES NFR BLD: 15.1 % (ref 4–15)
NEUTROPHILS # BLD AUTO: 1.8 K/UL (ref 1.8–7.7)
NEUTROPHILS NFR BLD: 46 % (ref 38–73)
NRBC BLD-RTO: 0 /100 WBC
PLATELET # BLD AUTO: 246 K/UL (ref 150–450)
PMV BLD AUTO: 9.7 FL (ref 9.2–12.9)
RBC # BLD AUTO: 4.89 M/UL (ref 4.6–6.2)
WBC # BLD AUTO: 3.84 K/UL (ref 3.9–12.7)

## 2021-11-15 PROCEDURE — 3078F PR MOST RECENT DIASTOLIC BLOOD PRESSURE < 80 MM HG: ICD-10-PCS | Mod: CPTII,S$GLB,, | Performed by: CLINICAL NURSE SPECIALIST

## 2021-11-15 PROCEDURE — 85025 COMPLETE CBC W/AUTO DIFF WBC: CPT | Performed by: CLINICAL NURSE SPECIALIST

## 2021-11-15 PROCEDURE — 3074F SYST BP LT 130 MM HG: CPT | Mod: CPTII,S$GLB,, | Performed by: CLINICAL NURSE SPECIALIST

## 2021-11-15 PROCEDURE — 1159F PR MEDICATION LIST DOCUMENTED IN MEDICAL RECORD: ICD-10-PCS | Mod: CPTII,S$GLB,, | Performed by: CLINICAL NURSE SPECIALIST

## 2021-11-15 PROCEDURE — 3008F PR BODY MASS INDEX (BMI) DOCUMENTED: ICD-10-PCS | Mod: CPTII,S$GLB,, | Performed by: CLINICAL NURSE SPECIALIST

## 2021-11-15 PROCEDURE — 99999 PR PBB SHADOW E&M-EST. PATIENT-LVL III: CPT | Mod: PBBFAC,,, | Performed by: CLINICAL NURSE SPECIALIST

## 2021-11-15 PROCEDURE — 99999 PR PBB SHADOW E&M-EST. PATIENT-LVL III: ICD-10-PCS | Mod: PBBFAC,,, | Performed by: CLINICAL NURSE SPECIALIST

## 2021-11-15 PROCEDURE — 99214 OFFICE O/P EST MOD 30 MIN: CPT | Mod: S$GLB,,, | Performed by: CLINICAL NURSE SPECIALIST

## 2021-11-15 PROCEDURE — 36415 COLL VENOUS BLD VENIPUNCTURE: CPT | Performed by: CLINICAL NURSE SPECIALIST

## 2021-11-15 PROCEDURE — 3074F PR MOST RECENT SYSTOLIC BLOOD PRESSURE < 130 MM HG: ICD-10-PCS | Mod: CPTII,S$GLB,, | Performed by: CLINICAL NURSE SPECIALIST

## 2021-11-15 PROCEDURE — 99214 PR OFFICE/OUTPT VISIT, EST, LEVL IV, 30-39 MIN: ICD-10-PCS | Mod: S$GLB,,, | Performed by: CLINICAL NURSE SPECIALIST

## 2021-11-15 PROCEDURE — 3078F DIAST BP <80 MM HG: CPT | Mod: CPTII,S$GLB,, | Performed by: CLINICAL NURSE SPECIALIST

## 2021-11-15 PROCEDURE — 1159F MED LIST DOCD IN RCRD: CPT | Mod: CPTII,S$GLB,, | Performed by: CLINICAL NURSE SPECIALIST

## 2021-11-15 PROCEDURE — 3008F BODY MASS INDEX DOCD: CPT | Mod: CPTII,S$GLB,, | Performed by: CLINICAL NURSE SPECIALIST

## 2021-11-26 ENCOUNTER — INFUSION (OUTPATIENT)
Dept: INFUSION THERAPY | Facility: HOSPITAL | Age: 51
End: 2021-11-26
Attending: PSYCHIATRY & NEUROLOGY
Payer: COMMERCIAL

## 2021-11-26 VITALS
SYSTOLIC BLOOD PRESSURE: 118 MMHG | HEART RATE: 85 BPM | OXYGEN SATURATION: 100 % | DIASTOLIC BLOOD PRESSURE: 67 MMHG | RESPIRATION RATE: 16 BRPM | TEMPERATURE: 99 F

## 2021-11-26 DIAGNOSIS — G35 MS (MULTIPLE SCLEROSIS): Primary | ICD-10-CM

## 2021-11-26 PROCEDURE — 96415 CHEMO IV INFUSION ADDL HR: CPT

## 2021-11-26 PROCEDURE — 63600175 PHARM REV CODE 636 W HCPCS: Mod: JG | Performed by: PSYCHIATRY & NEUROLOGY

## 2021-11-26 PROCEDURE — 96375 TX/PRO/DX INJ NEW DRUG ADDON: CPT

## 2021-11-26 PROCEDURE — 25000003 PHARM REV CODE 250: Performed by: PSYCHIATRY & NEUROLOGY

## 2021-11-26 PROCEDURE — 96367 TX/PROPH/DG ADDL SEQ IV INF: CPT

## 2021-11-26 PROCEDURE — 96413 CHEMO IV INFUSION 1 HR: CPT

## 2021-11-26 RX ORDER — ACETAMINOPHEN 500 MG
1000 TABLET ORAL
Status: CANCELLED | OUTPATIENT
Start: 2022-01-14

## 2021-11-26 RX ORDER — EPINEPHRINE 0.3 MG/.3ML
0.3 INJECTION SUBCUTANEOUS
Status: CANCELLED | OUTPATIENT
Start: 2022-01-14

## 2021-11-26 RX ORDER — DIPHENHYDRAMINE HYDROCHLORIDE 50 MG/ML
50 INJECTION INTRAMUSCULAR; INTRAVENOUS
Status: CANCELLED | OUTPATIENT
Start: 2022-01-14

## 2021-11-26 RX ORDER — HEPARIN 100 UNIT/ML
500 SYRINGE INTRAVENOUS
Status: CANCELLED | OUTPATIENT
Start: 2022-01-14

## 2021-11-26 RX ORDER — SODIUM CHLORIDE 0.9 % (FLUSH) 0.9 %
10 SYRINGE (ML) INJECTION
Status: CANCELLED | OUTPATIENT
Start: 2022-01-14

## 2021-11-26 RX ORDER — FAMOTIDINE 10 MG/ML
20 INJECTION INTRAVENOUS
Status: CANCELLED | OUTPATIENT
Start: 2022-01-14

## 2021-11-26 RX ORDER — ACETAMINOPHEN 500 MG
1000 TABLET ORAL
Status: COMPLETED | OUTPATIENT
Start: 2021-11-26 | End: 2021-11-26

## 2021-11-26 RX ORDER — FAMOTIDINE 10 MG/ML
20 INJECTION INTRAVENOUS
Status: COMPLETED | OUTPATIENT
Start: 2021-11-26 | End: 2021-11-26

## 2021-11-26 RX ADMIN — DIPHENHYDRAMINE HYDROCHLORIDE 50 MG: 50 INJECTION INTRAMUSCULAR; INTRAVENOUS at 09:11

## 2021-11-26 RX ADMIN — FAMOTIDINE 20 MG: 10 INJECTION INTRAVENOUS at 09:11

## 2021-11-26 RX ADMIN — OCRELIZUMAB 600 MG: 300 INJECTION INTRAVENOUS at 10:11

## 2021-11-26 RX ADMIN — DEXTROSE: 50 INJECTION, SOLUTION INTRAVENOUS at 09:11

## 2021-11-26 RX ADMIN — ACETAMINOPHEN 1000 MG: 500 TABLET ORAL at 09:11

## 2021-12-21 ENCOUNTER — PATIENT MESSAGE (OUTPATIENT)
Dept: NEUROLOGY | Facility: CLINIC | Age: 51
End: 2021-12-21
Payer: COMMERCIAL

## 2022-01-29 DIAGNOSIS — D84.9 IMMUNOSUPPRESSED STATUS: ICD-10-CM

## 2022-02-04 DIAGNOSIS — D84.9 IMMUNOSUPPRESSED STATUS: ICD-10-CM

## 2022-02-06 DIAGNOSIS — D84.9 IMMUNOSUPPRESSED STATUS: ICD-10-CM

## 2022-02-07 ENCOUNTER — TELEPHONE (OUTPATIENT)
Dept: NEUROLOGY | Facility: CLINIC | Age: 52
End: 2022-02-07
Payer: COMMERCIAL

## 2022-02-07 NOTE — TELEPHONE ENCOUNTER
----- Message from Warren Hogan sent at 2/7/2022  5:24 PM CST -----  Regarding: Ambulatory referral/consult Order for Evusheld  Contacted Patient with an attempt to schedule Evusheld Injection. Patient preferred to speak with provider first. Please contact patient at _[phone]_.  Please have patient call back [phone] when ready to schedule injection appointment. Thank you

## 2022-02-11 ENCOUNTER — INFUSION (OUTPATIENT)
Dept: INFUSION THERAPY | Facility: HOSPITAL | Age: 52
End: 2022-02-11
Payer: COMMERCIAL

## 2022-02-11 VITALS
HEART RATE: 77 BPM | WEIGHT: 158 LBS | SYSTOLIC BLOOD PRESSURE: 120 MMHG | OXYGEN SATURATION: 97 % | RESPIRATION RATE: 12 BRPM | DIASTOLIC BLOOD PRESSURE: 73 MMHG | TEMPERATURE: 98 F | BODY MASS INDEX: 23.33 KG/M2

## 2022-02-11 DIAGNOSIS — D70.0: ICD-10-CM

## 2022-02-11 DIAGNOSIS — D84.9 IMMUNOSUPPRESSED STATUS: Primary | ICD-10-CM

## 2022-02-11 PROCEDURE — 63600175 PHARM REV CODE 636 W HCPCS: Performed by: INTERNAL MEDICINE

## 2022-02-11 PROCEDURE — M0220 HC INJECTION ADMIN, TIXAGEVIMAB-CILGAVIMAB, INCL POST ADMIN MONIT: HCPCS | Performed by: INTERNAL MEDICINE

## 2022-02-11 RX ORDER — ALBUTEROL SULFATE 90 UG/1
2 AEROSOL, METERED RESPIRATORY (INHALATION) ONCE AS NEEDED
Status: CANCELLED | OUTPATIENT
Start: 2022-02-11

## 2022-02-11 RX ORDER — ONDANSETRON 4 MG/1
4 TABLET, ORALLY DISINTEGRATING ORAL ONCE AS NEEDED
Status: DISCONTINUED | OUTPATIENT
Start: 2022-02-11 | End: 2022-02-11 | Stop reason: HOSPADM

## 2022-02-11 RX ORDER — DIPHENHYDRAMINE HCL 25 MG
25 CAPSULE ORAL ONCE AS NEEDED
Status: DISCONTINUED | OUTPATIENT
Start: 2022-02-11 | End: 2022-02-11 | Stop reason: HOSPADM

## 2022-02-11 RX ORDER — DIPHENHYDRAMINE HCL 25 MG
25 CAPSULE ORAL ONCE AS NEEDED
Status: CANCELLED | OUTPATIENT
Start: 2022-02-11

## 2022-02-11 RX ORDER — EPINEPHRINE 0.3 MG/.3ML
0.3 INJECTION SUBCUTANEOUS
Status: DISCONTINUED | OUTPATIENT
Start: 2022-02-11 | End: 2022-02-11 | Stop reason: HOSPADM

## 2022-02-11 RX ORDER — ACETAMINOPHEN 325 MG/1
650 TABLET ORAL ONCE AS NEEDED
Status: CANCELLED | OUTPATIENT
Start: 2022-02-11

## 2022-02-11 RX ORDER — EPINEPHRINE 0.3 MG/.3ML
0.3 INJECTION SUBCUTANEOUS
Status: CANCELLED | OUTPATIENT
Start: 2022-02-11

## 2022-02-11 RX ORDER — ALBUTEROL SULFATE 90 UG/1
2 AEROSOL, METERED RESPIRATORY (INHALATION) ONCE AS NEEDED
Status: DISCONTINUED | OUTPATIENT
Start: 2022-02-11 | End: 2022-02-11 | Stop reason: HOSPADM

## 2022-02-11 RX ORDER — PREDNISONE 20 MG/1
40 TABLET ORAL ONCE AS NEEDED
Status: CANCELLED | OUTPATIENT
Start: 2022-02-11

## 2022-02-11 RX ORDER — ONDANSETRON 4 MG/1
4 TABLET, ORALLY DISINTEGRATING ORAL ONCE AS NEEDED
Status: CANCELLED | OUTPATIENT
Start: 2022-02-11

## 2022-02-11 RX ORDER — ACETAMINOPHEN 325 MG/1
650 TABLET ORAL ONCE AS NEEDED
Status: DISCONTINUED | OUTPATIENT
Start: 2022-02-11 | End: 2022-02-11 | Stop reason: HOSPADM

## 2022-02-11 RX ORDER — PREDNISONE 20 MG/1
40 TABLET ORAL ONCE AS NEEDED
Status: DISCONTINUED | OUTPATIENT
Start: 2022-02-11 | End: 2022-02-11 | Stop reason: HOSPADM

## 2022-02-11 RX ADMIN — Medication 150 MG: at 02:02

## 2022-02-11 NOTE — NURSING
2/11/2022 1406    Patient presents for Evusheld injection. No current complaints of discomfort. Reviewed process with patient. No questions or concerns.

## 2022-02-11 NOTE — NURSING
Patient received two gluteal intramuscular injections (left and right) of tixagevimab/cilgavimab (EVUSHELD) with no difficulties tolerating the medication  Patient with no questions or concerns. Ambulated per self from facility.

## 2022-02-28 ENCOUNTER — PATIENT MESSAGE (OUTPATIENT)
Dept: PSYCHIATRY | Facility: CLINIC | Age: 52
End: 2022-02-28
Payer: COMMERCIAL

## 2022-03-11 ENCOUNTER — INFUSION (OUTPATIENT)
Dept: INFUSION THERAPY | Facility: HOSPITAL | Age: 52
End: 2022-03-11
Payer: COMMERCIAL

## 2022-03-11 VITALS
TEMPERATURE: 99 F | OXYGEN SATURATION: 99 % | HEART RATE: 79 BPM | DIASTOLIC BLOOD PRESSURE: 71 MMHG | SYSTOLIC BLOOD PRESSURE: 121 MMHG | RESPIRATION RATE: 20 BRPM

## 2022-03-11 DIAGNOSIS — G35 MS (MULTIPLE SCLEROSIS): Primary | ICD-10-CM

## 2022-03-11 PROCEDURE — 63600175 PHARM REV CODE 636 W HCPCS: Performed by: INTERNAL MEDICINE

## 2022-03-11 PROCEDURE — M0220 HC INJECTION ADMIN, TIXAGEVIMAB-CILGAVIMAB, INCL POST ADMIN MONIT: HCPCS | Performed by: INTERNAL MEDICINE

## 2022-03-11 RX ORDER — EPINEPHRINE 0.3 MG/.3ML
0.3 INJECTION SUBCUTANEOUS ONCE
Status: CANCELLED | OUTPATIENT
Start: 2022-03-11 | End: 2022-03-11

## 2022-03-11 RX ORDER — ONDANSETRON 4 MG/1
4 TABLET, ORALLY DISINTEGRATING ORAL ONCE
Status: CANCELLED | OUTPATIENT
Start: 2022-03-11 | End: 2022-03-11

## 2022-03-11 RX ORDER — DIPHENHYDRAMINE HCL 25 MG
25 CAPSULE ORAL ONCE
Status: CANCELLED | OUTPATIENT
Start: 2022-03-11 | End: 2022-03-11

## 2022-03-11 RX ORDER — PREDNISONE 20 MG/1
40 TABLET ORAL ONCE
Status: CANCELLED | OUTPATIENT
Start: 2022-03-11 | End: 2022-03-11

## 2022-03-11 RX ORDER — ALBUTEROL SULFATE 90 UG/1
2 AEROSOL, METERED RESPIRATORY (INHALATION) ONCE
Status: CANCELLED | OUTPATIENT
Start: 2022-03-11 | End: 2022-03-11

## 2022-03-11 RX ORDER — ACETAMINOPHEN 325 MG/1
650 TABLET ORAL ONCE
Status: CANCELLED | OUTPATIENT
Start: 2022-03-11 | End: 2022-03-11

## 2022-03-11 RX ADMIN — Medication 150 MG: at 04:03

## 2022-04-11 ENCOUNTER — TELEPHONE (OUTPATIENT)
Dept: NEUROLOGY | Facility: CLINIC | Age: 52
End: 2022-04-11

## 2022-04-11 DIAGNOSIS — G35 MULTIPLE SCLEROSIS: Primary | ICD-10-CM

## 2022-04-12 ENCOUNTER — LAB VISIT (OUTPATIENT)
Dept: LAB | Facility: HOSPITAL | Age: 52
End: 2022-04-12
Payer: COMMERCIAL

## 2022-04-12 DIAGNOSIS — G35 MULTIPLE SCLEROSIS: ICD-10-CM

## 2022-04-12 LAB
BASOPHILS # BLD AUTO: 0.04 K/UL (ref 0–0.2)
BASOPHILS NFR BLD: 1.2 % (ref 0–1.9)
DIFFERENTIAL METHOD: ABNORMAL
EOSINOPHIL # BLD AUTO: 0.2 K/UL (ref 0–0.5)
EOSINOPHIL NFR BLD: 5.2 % (ref 0–8)
ERYTHROCYTE [DISTWIDTH] IN BLOOD BY AUTOMATED COUNT: 11.8 % (ref 11.5–14.5)
HBV CORE AB SERPL QL IA: NEGATIVE
HBV SURFACE AB SER-ACNC: NEGATIVE M[IU]/ML
HBV SURFACE AG SERPL QL IA: NEGATIVE
HCT VFR BLD AUTO: 44.4 % (ref 40–54)
HGB BLD-MCNC: 13.9 G/DL (ref 14–18)
IMM GRANULOCYTES # BLD AUTO: 0.01 K/UL (ref 0–0.04)
IMM GRANULOCYTES NFR BLD AUTO: 0.3 % (ref 0–0.5)
LYMPHOCYTES # BLD AUTO: 1 K/UL (ref 1–4.8)
LYMPHOCYTES NFR BLD: 30.7 % (ref 18–48)
MCH RBC QN AUTO: 28.7 PG (ref 27–31)
MCHC RBC AUTO-ENTMCNC: 31.3 G/DL (ref 32–36)
MCV RBC AUTO: 92 FL (ref 82–98)
MONOCYTES # BLD AUTO: 0.4 K/UL (ref 0.3–1)
MONOCYTES NFR BLD: 11.9 % (ref 4–15)
NEUTROPHILS # BLD AUTO: 1.7 K/UL (ref 1.8–7.7)
NEUTROPHILS NFR BLD: 50.7 % (ref 38–73)
NRBC BLD-RTO: 0 /100 WBC
PLATELET # BLD AUTO: 274 K/UL (ref 150–450)
PMV BLD AUTO: 9.8 FL (ref 9.2–12.9)
RBC # BLD AUTO: 4.85 M/UL (ref 4.6–6.2)
WBC # BLD AUTO: 3.29 K/UL (ref 3.9–12.7)

## 2022-04-12 PROCEDURE — 86704 HEP B CORE ANTIBODY TOTAL: CPT | Performed by: CLINICAL NURSE SPECIALIST

## 2022-04-12 PROCEDURE — 87340 HEPATITIS B SURFACE AG IA: CPT | Performed by: CLINICAL NURSE SPECIALIST

## 2022-04-12 PROCEDURE — 36415 COLL VENOUS BLD VENIPUNCTURE: CPT | Performed by: CLINICAL NURSE SPECIALIST

## 2022-04-12 PROCEDURE — 86706 HEP B SURFACE ANTIBODY: CPT | Performed by: CLINICAL NURSE SPECIALIST

## 2022-04-12 PROCEDURE — 85025 COMPLETE CBC W/AUTO DIFF WBC: CPT | Performed by: CLINICAL NURSE SPECIALIST

## 2022-04-21 DIAGNOSIS — G35 MULTIPLE SCLEROSIS: Primary | ICD-10-CM

## 2022-04-21 NOTE — TELEPHONE ENCOUNTER
[facility] code:    Medication name: OCREVUS     The request has been denied for the following reason (be specific): NO CRITERIA FOR OUT PATIENT HOSPITAL SETTING     Instructions for peer to peer / appeal:     Name: Helio Nolasco   : 1970   Primary Coverage Payor: [unfilled] [facility] OPEN ACCESS   Primary Coverage Name: [unfilled]   Primary Coverage ID: [unfilled] [id number]   Primary Group Number: [unfilled][id number]     PRE SERVICE NURSE LA JENNINGS RN     SEE REFERRAL [id number] MORE INFO AND DENIAL LETTER ATTACHED

## 2022-04-29 RX ORDER — OCRELIZUMAB 300 MG/10ML
INJECTION INTRAVENOUS
Qty: 20 ML | Refills: 1 | Status: SHIPPED | OUTPATIENT
Start: 2022-04-29 | End: 2022-10-16 | Stop reason: SDUPTHER

## 2022-05-03 NOTE — TELEPHONE ENCOUNTER
Faxed Ocrevus rx, demo, benefits, med list, clinicals and labs to [facility] at [phone].

## 2022-05-05 ENCOUNTER — TELEPHONE (OUTPATIENT)
Dept: NEUROLOGY | Facility: CLINIC | Age: 52
End: 2022-05-05
Payer: COMMERCIAL

## 2022-05-05 NOTE — TELEPHONE ENCOUNTER
Returned call to Kristin at [facility] and left VM making her aware pt's information was faxed to [facility] on 5/4.

## 2022-05-05 NOTE — TELEPHONE ENCOUNTER
----- Message from Thea Pham sent at 5/5/2022 12:52 PM CDT -----  Contact: @[phone] ext. [phone]  Kristin from [employer] @[phone] ext. [phone]  is calling in regards stating that his stay is not cover due to the medicine not available at location  needs Demo, orders, prescription, updated clinic notes and labs faxed over to [phone] and the clinic he will attend is [facility]

## 2022-06-16 ENCOUNTER — TELEPHONE (OUTPATIENT)
Dept: GASTROENTEROLOGY | Facility: CLINIC | Age: 52
End: 2022-06-16
Payer: COMMERCIAL

## 2022-06-16 DIAGNOSIS — Z12.11 COLON CANCER SCREENING: Primary | ICD-10-CM

## 2022-06-24 ENCOUNTER — PATIENT MESSAGE (OUTPATIENT)
Dept: PSYCHIATRY | Facility: CLINIC | Age: 52
End: 2022-06-24
Payer: COMMERCIAL

## 2022-08-02 ENCOUNTER — PATIENT MESSAGE (OUTPATIENT)
Dept: NEUROLOGY | Facility: CLINIC | Age: 52
End: 2022-08-02
Payer: COMMERCIAL

## 2022-09-19 ENCOUNTER — PATIENT MESSAGE (OUTPATIENT)
Dept: NEUROLOGY | Facility: CLINIC | Age: 52
End: 2022-09-19
Payer: COMMERCIAL

## 2022-10-02 DIAGNOSIS — G35 MULTIPLE SCLEROSIS: Primary | ICD-10-CM

## 2022-10-05 ENCOUNTER — TELEPHONE (OUTPATIENT)
Dept: NEUROLOGY | Facility: CLINIC | Age: 52
End: 2022-10-05
Payer: COMMERCIAL

## 2022-10-05 NOTE — TELEPHONE ENCOUNTER
Called and left vm for patient to get scheduled for maintenance labs, mri + follow up, contact info provided.

## 2022-10-08 ENCOUNTER — LAB VISIT (OUTPATIENT)
Dept: LAB | Facility: HOSPITAL | Age: 52
End: 2022-10-08
Payer: COMMERCIAL

## 2022-10-08 DIAGNOSIS — G35 MULTIPLE SCLEROSIS: ICD-10-CM

## 2022-10-08 LAB
BASOPHILS # BLD AUTO: 0.04 K/UL (ref 0–0.2)
BASOPHILS NFR BLD: 1.4 % (ref 0–1.9)
DIFFERENTIAL METHOD: ABNORMAL
EOSINOPHIL # BLD AUTO: 0.2 K/UL (ref 0–0.5)
EOSINOPHIL NFR BLD: 5.4 % (ref 0–8)
ERYTHROCYTE [DISTWIDTH] IN BLOOD BY AUTOMATED COUNT: 11.9 % (ref 11.5–14.5)
HBV CORE AB SERPL QL IA: NORMAL
HBV SURFACE AB SER-ACNC: 3.08 MIU/ML
HBV SURFACE AB SER-ACNC: NORMAL M[IU]/ML
HBV SURFACE AG SERPL QL IA: NORMAL
HCT VFR BLD AUTO: 46.2 % (ref 40–54)
HGB BLD-MCNC: 14.6 G/DL (ref 14–18)
IGA SERPL-MCNC: 178 MG/DL (ref 40–350)
IGG SERPL-MCNC: 1168 MG/DL (ref 650–1600)
IGM SERPL-MCNC: 124 MG/DL (ref 50–300)
IMM GRANULOCYTES # BLD AUTO: 0.01 K/UL (ref 0–0.04)
IMM GRANULOCYTES NFR BLD AUTO: 0.3 % (ref 0–0.5)
LYMPHOCYTES # BLD AUTO: 1.2 K/UL (ref 1–4.8)
LYMPHOCYTES NFR BLD: 39.5 % (ref 18–48)
MCH RBC QN AUTO: 29.2 PG (ref 27–31)
MCHC RBC AUTO-ENTMCNC: 31.6 G/DL (ref 32–36)
MCV RBC AUTO: 92 FL (ref 82–98)
MONOCYTES # BLD AUTO: 0.3 K/UL (ref 0.3–1)
MONOCYTES NFR BLD: 9.8 % (ref 4–15)
NEUTROPHILS # BLD AUTO: 1.3 K/UL (ref 1.8–7.7)
NEUTROPHILS NFR BLD: 43.6 % (ref 38–73)
NRBC BLD-RTO: 0 /100 WBC
PLATELET # BLD AUTO: 312 K/UL (ref 150–450)
PMV BLD AUTO: 9.6 FL (ref 9.2–12.9)
RBC # BLD AUTO: 5 M/UL (ref 4.6–6.2)
WBC # BLD AUTO: 2.96 K/UL (ref 3.9–12.7)

## 2022-10-08 PROCEDURE — 36415 COLL VENOUS BLD VENIPUNCTURE: CPT | Performed by: CLINICAL NURSE SPECIALIST

## 2022-10-08 PROCEDURE — 87340 HEPATITIS B SURFACE AG IA: CPT | Performed by: CLINICAL NURSE SPECIALIST

## 2022-10-08 PROCEDURE — 82784 ASSAY IGA/IGD/IGG/IGM EACH: CPT | Mod: 59 | Performed by: CLINICAL NURSE SPECIALIST

## 2022-10-08 PROCEDURE — 86706 HEP B SURFACE ANTIBODY: CPT | Performed by: CLINICAL NURSE SPECIALIST

## 2022-10-08 PROCEDURE — 86704 HEP B CORE ANTIBODY TOTAL: CPT | Performed by: CLINICAL NURSE SPECIALIST

## 2022-10-08 PROCEDURE — 85025 COMPLETE CBC W/AUTO DIFF WBC: CPT | Performed by: CLINICAL NURSE SPECIALIST

## 2022-10-16 DIAGNOSIS — G35 MULTIPLE SCLEROSIS: ICD-10-CM

## 2022-10-16 DIAGNOSIS — D84.9 IMMUNOSUPPRESSED STATUS: Primary | ICD-10-CM

## 2022-10-17 RX ORDER — OCRELIZUMAB 300 MG/10ML
INJECTION INTRAVENOUS
Qty: 20 ML | Refills: 1 | Status: SHIPPED | OUTPATIENT
Start: 2022-10-17

## 2022-11-01 ENCOUNTER — PATIENT MESSAGE (OUTPATIENT)
Dept: NEUROLOGY | Facility: CLINIC | Age: 52
End: 2022-11-01
Payer: COMMERCIAL

## 2022-11-03 ENCOUNTER — INFUSION (OUTPATIENT)
Dept: INFUSION THERAPY | Facility: HOSPITAL | Age: 52
End: 2022-11-03
Attending: INTERNAL MEDICINE
Payer: COMMERCIAL

## 2022-11-03 ENCOUNTER — HOSPITAL ENCOUNTER (OUTPATIENT)
Dept: RADIOLOGY | Facility: HOSPITAL | Age: 52
Discharge: HOME | End: 2022-11-03
Attending: CLINICAL NURSE SPECIALIST
Payer: COMMERCIAL

## 2022-11-03 VITALS
OXYGEN SATURATION: 99 % | DIASTOLIC BLOOD PRESSURE: 83 MMHG | HEART RATE: 63 BPM | RESPIRATION RATE: 18 BRPM | SYSTOLIC BLOOD PRESSURE: 154 MMHG | TEMPERATURE: 98 F

## 2022-11-03 DIAGNOSIS — G35 MS (MULTIPLE SCLEROSIS): Primary | ICD-10-CM

## 2022-11-03 DIAGNOSIS — D84.9 IMMUNOSUPPRESSION: ICD-10-CM

## 2022-11-03 DIAGNOSIS — G35 MULTIPLE SCLEROSIS: ICD-10-CM

## 2022-11-03 PROCEDURE — M0220 HC INJECTION ADMIN, TIXAGEVIMAB-CILGAVIMAB, INCL POST ADMIN MONIT: HCPCS | Performed by: INTERNAL MEDICINE

## 2022-11-03 PROCEDURE — 63600175 PHARM REV CODE 636 W HCPCS: Performed by: INTERNAL MEDICINE

## 2022-11-03 PROCEDURE — 70551 MRI BRAIN STEM W/O DYE: CPT | Mod: TC

## 2022-11-03 PROCEDURE — 70551 MRI BRAIN STEM W/O DYE: CPT | Mod: 26,,, | Performed by: RADIOLOGY

## 2022-11-03 PROCEDURE — 70551 MRI BRAIN DEMYELINATING WITHOUT CONTRAST: ICD-10-PCS | Mod: 26,,, | Performed by: RADIOLOGY

## 2022-11-03 RX ORDER — EPINEPHRINE 0.3 MG/.3ML
0.3 INJECTION SUBCUTANEOUS ONCE AS NEEDED
OUTPATIENT
Start: 2022-11-03

## 2022-11-03 RX ORDER — DIPHENHYDRAMINE HCL 25 MG
25 CAPSULE ORAL ONCE AS NEEDED
Status: DISCONTINUED | OUTPATIENT
Start: 2022-11-03 | End: 2022-11-03 | Stop reason: HOSPADM

## 2022-11-03 RX ORDER — PREDNISONE 20 MG/1
40 TABLET ORAL ONCE AS NEEDED
OUTPATIENT
Start: 2022-11-03

## 2022-11-03 RX ORDER — ONDANSETRON 4 MG/1
4 TABLET, ORALLY DISINTEGRATING ORAL ONCE AS NEEDED
OUTPATIENT
Start: 2022-11-03

## 2022-11-03 RX ORDER — ONDANSETRON 4 MG/1
4 TABLET, ORALLY DISINTEGRATING ORAL ONCE AS NEEDED
Status: DISCONTINUED | OUTPATIENT
Start: 2022-11-03 | End: 2022-11-03 | Stop reason: HOSPADM

## 2022-11-03 RX ORDER — DIPHENHYDRAMINE HCL 25 MG
25 CAPSULE ORAL ONCE AS NEEDED
OUTPATIENT
Start: 2022-11-03

## 2022-11-03 RX ORDER — EPINEPHRINE 0.3 MG/.3ML
0.3 INJECTION SUBCUTANEOUS ONCE AS NEEDED
Status: DISCONTINUED | OUTPATIENT
Start: 2022-11-03 | End: 2022-11-03 | Stop reason: HOSPADM

## 2022-11-03 RX ORDER — PREDNISONE 20 MG/1
40 TABLET ORAL ONCE AS NEEDED
Status: DISCONTINUED | OUTPATIENT
Start: 2022-11-03 | End: 2022-11-03 | Stop reason: HOSPADM

## 2022-11-03 RX ORDER — ALBUTEROL SULFATE 90 UG/1
2 AEROSOL, METERED RESPIRATORY (INHALATION) ONCE AS NEEDED
OUTPATIENT
Start: 2022-11-03

## 2022-11-03 RX ORDER — ALBUTEROL SULFATE 90 UG/1
2 AEROSOL, METERED RESPIRATORY (INHALATION) ONCE AS NEEDED
Status: DISCONTINUED | OUTPATIENT
Start: 2022-11-03 | End: 2022-11-03 | Stop reason: HOSPADM

## 2022-11-03 RX ADMIN — Medication 300 MG: at 11:11

## 2022-11-08 DIAGNOSIS — Z12.11 COLON CANCER SCREENING: Primary | ICD-10-CM

## 2022-11-10 ENCOUNTER — OFFICE VISIT (OUTPATIENT)
Dept: NEUROLOGY | Facility: CLINIC | Age: 52
End: 2022-11-10
Payer: COMMERCIAL

## 2022-11-10 VITALS
HEART RATE: 69 BPM | BODY MASS INDEX: 25.57 KG/M2 | SYSTOLIC BLOOD PRESSURE: 126 MMHG | DIASTOLIC BLOOD PRESSURE: 80 MMHG | HEIGHT: 69 IN | WEIGHT: 172.63 LBS

## 2022-11-10 DIAGNOSIS — Z71.89 COUNSELING REGARDING GOALS OF CARE: ICD-10-CM

## 2022-11-10 DIAGNOSIS — Z79.899 HIGH RISK MEDICATION USE: ICD-10-CM

## 2022-11-10 DIAGNOSIS — Z29.89 PROPHYLACTIC IMMUNOTHERAPY: ICD-10-CM

## 2022-11-10 DIAGNOSIS — G35 MULTIPLE SCLEROSIS: Primary | ICD-10-CM

## 2022-11-10 PROCEDURE — 3008F BODY MASS INDEX DOCD: CPT | Mod: CPTII,S$GLB,, | Performed by: CLINICAL NURSE SPECIALIST

## 2022-11-10 PROCEDURE — 3074F PR MOST RECENT SYSTOLIC BLOOD PRESSURE < 130 MM HG: ICD-10-PCS | Mod: CPTII,S$GLB,, | Performed by: CLINICAL NURSE SPECIALIST

## 2022-11-10 PROCEDURE — 99215 OFFICE O/P EST HI 40 MIN: CPT | Mod: S$GLB,,, | Performed by: CLINICAL NURSE SPECIALIST

## 2022-11-10 PROCEDURE — 99215 PR OFFICE/OUTPT VISIT, EST, LEVL V, 40-54 MIN: ICD-10-PCS | Mod: S$GLB,,, | Performed by: CLINICAL NURSE SPECIALIST

## 2022-11-10 PROCEDURE — 99999 PR PBB SHADOW E&M-EST. PATIENT-LVL IV: ICD-10-PCS | Mod: PBBFAC,,, | Performed by: CLINICAL NURSE SPECIALIST

## 2022-11-10 PROCEDURE — 1159F MED LIST DOCD IN RCRD: CPT | Mod: CPTII,S$GLB,, | Performed by: CLINICAL NURSE SPECIALIST

## 2022-11-10 PROCEDURE — 3079F DIAST BP 80-89 MM HG: CPT | Mod: CPTII,S$GLB,, | Performed by: CLINICAL NURSE SPECIALIST

## 2022-11-10 PROCEDURE — 3079F PR MOST RECENT DIASTOLIC BLOOD PRESSURE 80-89 MM HG: ICD-10-PCS | Mod: CPTII,S$GLB,, | Performed by: CLINICAL NURSE SPECIALIST

## 2022-11-10 PROCEDURE — 3008F PR BODY MASS INDEX (BMI) DOCUMENTED: ICD-10-PCS | Mod: CPTII,S$GLB,, | Performed by: CLINICAL NURSE SPECIALIST

## 2022-11-10 PROCEDURE — 99999 PR PBB SHADOW E&M-EST. PATIENT-LVL IV: CPT | Mod: PBBFAC,,, | Performed by: CLINICAL NURSE SPECIALIST

## 2022-11-10 PROCEDURE — 1159F PR MEDICATION LIST DOCUMENTED IN MEDICAL RECORD: ICD-10-PCS | Mod: CPTII,S$GLB,, | Performed by: CLINICAL NURSE SPECIALIST

## 2022-11-10 PROCEDURE — 3074F SYST BP LT 130 MM HG: CPT | Mod: CPTII,S$GLB,, | Performed by: CLINICAL NURSE SPECIALIST

## 2022-11-14 ENCOUNTER — DOCUMENTATION ONLY (OUTPATIENT)
Dept: NEUROLOGY | Facility: CLINIC | Age: 52
End: 2022-11-14

## 2022-11-21 ENCOUNTER — PATIENT MESSAGE (OUTPATIENT)
Dept: NEUROLOGY | Facility: CLINIC | Age: 52
End: 2022-11-21
Payer: COMMERCIAL

## 2022-11-29 NOTE — TELEPHONE ENCOUNTER
Call reference #[id number], 12:41PM.   Call time 1 hr 15 min.  Current authorization transferred from [facility] to [facility]/IMRSV.

## 2022-12-01 ENCOUNTER — PATIENT MESSAGE (OUTPATIENT)
Dept: PSYCHIATRY | Facility: CLINIC | Age: 52
End: 2022-12-01
Payer: COMMERCIAL

## 2022-12-06 ENCOUNTER — PATIENT MESSAGE (OUTPATIENT)
Dept: NEUROLOGY | Facility: CLINIC | Age: 52
End: 2022-12-06
Payer: COMMERCIAL

## 2022-12-19 ENCOUNTER — DOCUMENTATION ONLY (OUTPATIENT)
Dept: NEUROLOGY | Facility: CLINIC | Age: 52
End: 2022-12-19
Payer: COMMERCIAL

## 2022-12-19 DIAGNOSIS — S49.91XA INJURY OF SHOULDER, RIGHT, INITIAL ENCOUNTER: Primary | ICD-10-CM

## 2022-12-20 ENCOUNTER — HOSPITAL ENCOUNTER (OUTPATIENT)
Dept: RADIOLOGY | Facility: HOSPITAL | Age: 52
Discharge: HOME | End: 2022-12-20
Attending: FAMILY MEDICINE
Payer: COMMERCIAL

## 2022-12-20 ENCOUNTER — PATIENT MESSAGE (OUTPATIENT)
Dept: NEUROLOGY | Facility: CLINIC | Age: 52
End: 2022-12-20
Payer: COMMERCIAL

## 2022-12-20 ENCOUNTER — DOCUMENTATION ONLY (OUTPATIENT)
Dept: NEUROLOGY | Facility: CLINIC | Age: 52
End: 2022-12-20
Payer: COMMERCIAL

## 2022-12-20 DIAGNOSIS — S49.91XA INJURY OF SHOULDER, RIGHT, INITIAL ENCOUNTER: ICD-10-CM

## 2022-12-20 PROCEDURE — 73030 X-RAY EXAM OF SHOULDER: CPT | Mod: 26,RT,, | Performed by: RADIOLOGY

## 2022-12-20 PROCEDURE — 73030 X-RAY EXAM OF SHOULDER: CPT | Mod: TC,FY,RT

## 2022-12-20 PROCEDURE — 73030 XR SHOULDER COMPLETE 2 OR MORE VIEWS RIGHT: ICD-10-PCS | Mod: 26,RT,, | Performed by: RADIOLOGY

## 2022-12-20 NOTE — TELEPHONE ENCOUNTER
Multiple communications with [facility], and pt. Pt now scheduled for his Ocrevus on 12/23/22.

## 2022-12-20 NOTE — PROGRESS NOTES
Medication approval received from [facility] for Ocrevus 300mg/10mL vial     Auth# [id number]  [id number] : Ocrevus 600mg/ infused every 6 months for 1 year

## 2022-12-23 ENCOUNTER — PATIENT MESSAGE (OUTPATIENT)
Dept: NEUROLOGY | Facility: CLINIC | Age: 52
End: 2022-12-23
Payer: COMMERCIAL

## 2023-02-06 ENCOUNTER — DOCUMENTATION ONLY (OUTPATIENT)
Dept: NEUROLOGY | Facility: CLINIC | Age: 53
End: 2023-02-06
Payer: COMMERCIAL

## 2023-02-20 ENCOUNTER — PATIENT MESSAGE (OUTPATIENT)
Dept: PSYCHIATRY | Facility: CLINIC | Age: 53
End: 2023-02-20
Payer: COMMERCIAL

## 2023-04-15 ENCOUNTER — LAB VISIT (OUTPATIENT)
Dept: LAB | Facility: HOSPITAL | Age: 53
End: 2023-04-15
Attending: PSYCHIATRY & NEUROLOGY
Payer: COMMERCIAL

## 2023-04-15 DIAGNOSIS — G35 MULTIPLE SCLEROSIS: ICD-10-CM

## 2023-04-15 LAB
ALBUMIN SERPL BCP-MCNC: 4 G/DL (ref 3.5–5.2)
ALP SERPL-CCNC: 64 U/L (ref 55–135)
ALT SERPL W/O P-5'-P-CCNC: 28 U/L (ref 10–44)
ANION GAP SERPL CALC-SCNC: 9 MMOL/L (ref 8–16)
AST SERPL-CCNC: 21 U/L (ref 10–40)
BASOPHILS # BLD AUTO: 0.05 K/UL (ref 0–0.2)
BASOPHILS NFR BLD: 1.6 % (ref 0–1.9)
BILIRUB SERPL-MCNC: 0.4 MG/DL (ref 0.1–1)
BUN SERPL-MCNC: 12 MG/DL (ref 6–20)
CALCIUM SERPL-MCNC: 9.2 MG/DL (ref 8.7–10.5)
CHLORIDE SERPL-SCNC: 105 MMOL/L (ref 95–110)
CO2 SERPL-SCNC: 26 MMOL/L (ref 23–29)
CREAT SERPL-MCNC: 0.8 MG/DL (ref 0.5–1.4)
DIFFERENTIAL METHOD: ABNORMAL
EOSINOPHIL # BLD AUTO: 0.1 K/UL (ref 0–0.5)
EOSINOPHIL NFR BLD: 3.2 % (ref 0–8)
ERYTHROCYTE [DISTWIDTH] IN BLOOD BY AUTOMATED COUNT: 12 % (ref 11.5–14.5)
EST. GFR  (NO RACE VARIABLE): >60 ML/MIN/1.73 M^2
GLUCOSE SERPL-MCNC: 95 MG/DL (ref 70–110)
HBV CORE AB SERPL QL IA: NORMAL
HBV SURFACE AB SER-ACNC: <3 MIU/ML
HBV SURFACE AB SER-ACNC: NORMAL M[IU]/ML
HBV SURFACE AG SERPL QL IA: NORMAL
HCT VFR BLD AUTO: 46.6 % (ref 40–54)
HGB BLD-MCNC: 14.6 G/DL (ref 14–18)
IGA SERPL-MCNC: 173 MG/DL (ref 40–350)
IGG SERPL-MCNC: 1118 MG/DL (ref 650–1600)
IGM SERPL-MCNC: 110 MG/DL (ref 50–300)
IMM GRANULOCYTES # BLD AUTO: 0.01 K/UL (ref 0–0.04)
IMM GRANULOCYTES NFR BLD AUTO: 0.3 % (ref 0–0.5)
LYMPHOCYTES # BLD AUTO: 0.9 K/UL (ref 1–4.8)
LYMPHOCYTES NFR BLD: 29.2 % (ref 18–48)
MCH RBC QN AUTO: 28.6 PG (ref 27–31)
MCHC RBC AUTO-ENTMCNC: 31.3 G/DL (ref 32–36)
MCV RBC AUTO: 91 FL (ref 82–98)
MONOCYTES # BLD AUTO: 0.4 K/UL (ref 0.3–1)
MONOCYTES NFR BLD: 11.7 % (ref 4–15)
NEUTROPHILS # BLD AUTO: 1.7 K/UL (ref 1.8–7.7)
NEUTROPHILS NFR BLD: 54 % (ref 38–73)
NRBC BLD-RTO: 0 /100 WBC
PLATELET # BLD AUTO: 310 K/UL (ref 150–450)
PMV BLD AUTO: 10 FL (ref 9.2–12.9)
POTASSIUM SERPL-SCNC: 4.3 MMOL/L (ref 3.5–5.1)
PROT SERPL-MCNC: 7.1 G/DL (ref 6–8.4)
RBC # BLD AUTO: 5.1 M/UL (ref 4.6–6.2)
SODIUM SERPL-SCNC: 140 MMOL/L (ref 136–145)
WBC # BLD AUTO: 3.08 K/UL (ref 3.9–12.7)

## 2023-04-15 PROCEDURE — 86706 HEP B SURFACE ANTIBODY: CPT | Performed by: CLINICAL NURSE SPECIALIST

## 2023-04-15 PROCEDURE — 36415 COLL VENOUS BLD VENIPUNCTURE: CPT | Performed by: CLINICAL NURSE SPECIALIST

## 2023-04-15 PROCEDURE — 85025 COMPLETE CBC W/AUTO DIFF WBC: CPT | Performed by: CLINICAL NURSE SPECIALIST

## 2023-04-15 PROCEDURE — 87340 HEPATITIS B SURFACE AG IA: CPT | Performed by: CLINICAL NURSE SPECIALIST

## 2023-04-15 PROCEDURE — 82784 ASSAY IGA/IGD/IGG/IGM EACH: CPT | Mod: 59 | Performed by: CLINICAL NURSE SPECIALIST

## 2023-04-15 PROCEDURE — 86704 HEP B CORE ANTIBODY TOTAL: CPT | Performed by: CLINICAL NURSE SPECIALIST

## 2023-04-15 PROCEDURE — 80053 COMPREHEN METABOLIC PANEL: CPT | Performed by: CLINICAL NURSE SPECIALIST

## 2023-04-17 DIAGNOSIS — G35 MULTIPLE SCLEROSIS: Primary | ICD-10-CM

## 2023-05-21 ENCOUNTER — TELEPHONE (OUTPATIENT)
Dept: NEUROLOGY | Facility: CLINIC | Age: 53
End: 2023-05-21

## 2023-05-22 NOTE — TELEPHONE ENCOUNTER
----- Message from Kendy Clark, APRN, CNS sent at 4/17/2023  9:29 AM CDT -----  Low WBC, ANC, and ALC, but stable; SDW=014  Negative hep B labs   Normal CMP and immunoglobulins  Check CBC before infusion, but he can be scheduled

## 2023-06-03 ENCOUNTER — LAB VISIT (OUTPATIENT)
Dept: LAB | Facility: HOSPITAL | Age: 53
End: 2023-06-03
Payer: COMMERCIAL

## 2023-06-03 DIAGNOSIS — G35 MULTIPLE SCLEROSIS: ICD-10-CM

## 2023-06-03 LAB
BASOPHILS # BLD AUTO: 0.04 K/UL (ref 0–0.2)
BASOPHILS NFR BLD: 1.3 % (ref 0–1.9)
DIFFERENTIAL METHOD: ABNORMAL
EOSINOPHIL # BLD AUTO: 0.2 K/UL (ref 0–0.5)
EOSINOPHIL NFR BLD: 5.1 % (ref 0–8)
ERYTHROCYTE [DISTWIDTH] IN BLOOD BY AUTOMATED COUNT: 12.2 % (ref 11.5–14.5)
HCT VFR BLD AUTO: 46.6 % (ref 40–54)
HGB BLD-MCNC: 14.4 G/DL (ref 14–18)
IMM GRANULOCYTES # BLD AUTO: 0.01 K/UL (ref 0–0.04)
IMM GRANULOCYTES NFR BLD AUTO: 0.3 % (ref 0–0.5)
LYMPHOCYTES # BLD AUTO: 1.1 K/UL (ref 1–4.8)
LYMPHOCYTES NFR BLD: 33.7 % (ref 18–48)
MCH RBC QN AUTO: 28.6 PG (ref 27–31)
MCHC RBC AUTO-ENTMCNC: 30.9 G/DL (ref 32–36)
MCV RBC AUTO: 93 FL (ref 82–98)
MONOCYTES # BLD AUTO: 0.4 K/UL (ref 0.3–1)
MONOCYTES NFR BLD: 12.5 % (ref 4–15)
NEUTROPHILS # BLD AUTO: 1.5 K/UL (ref 1.8–7.7)
NEUTROPHILS NFR BLD: 47.1 % (ref 38–73)
NRBC BLD-RTO: 0 /100 WBC
PLATELET # BLD AUTO: 296 K/UL (ref 150–450)
PMV BLD AUTO: 9.7 FL (ref 9.2–12.9)
RBC # BLD AUTO: 5.04 M/UL (ref 4.6–6.2)
WBC # BLD AUTO: 3.12 K/UL (ref 3.9–12.7)

## 2023-06-03 PROCEDURE — 36415 COLL VENOUS BLD VENIPUNCTURE: CPT | Performed by: CLINICAL NURSE SPECIALIST

## 2023-06-03 PROCEDURE — 85025 COMPLETE CBC W/AUTO DIFF WBC: CPT | Performed by: CLINICAL NURSE SPECIALIST

## 2023-07-21 ENCOUNTER — PATIENT MESSAGE (OUTPATIENT)
Dept: PSYCHIATRY | Facility: CLINIC | Age: 53
End: 2023-07-21
Payer: COMMERCIAL

## 2023-08-25 ENCOUNTER — PATIENT MESSAGE (OUTPATIENT)
Dept: NEUROLOGY | Facility: CLINIC | Age: 53
End: 2023-08-25
Payer: COMMERCIAL

## 2023-09-26 ENCOUNTER — TELEPHONE (OUTPATIENT)
Dept: NEUROLOGY | Facility: CLINIC | Age: 53
End: 2023-09-26
Payer: COMMERCIAL

## 2023-09-26 NOTE — TELEPHONE ENCOUNTER
----- Message from Micaela Art sent at 9/26/2023 10:56 AM CDT -----  Regarding: orders  Contact: [phone]  Owen martins calling on behalf of pt to have orders sent over to diagnostic imaging, she states she called Friday and the place still does not have them. MRI brain. Fax # [phone]. Call back # [phone].

## 2023-10-12 ENCOUNTER — DOCUMENTATION ONLY (OUTPATIENT)
Dept: NEUROLOGY | Facility: CLINIC | Age: 53
End: 2023-10-12
Payer: COMMERCIAL

## 2023-10-13 ENCOUNTER — DOCUMENTATION ONLY (OUTPATIENT)
Dept: NEUROLOGY | Facility: CLINIC | Age: 53
End: 2023-10-13
Payer: COMMERCIAL

## 2023-10-13 NOTE — PROGRESS NOTES
Faxed pt's most recent office note from 11/10/2022 appt with BO Borja, to [facility] at [phone].

## 2023-11-28 ENCOUNTER — LAB VISIT (OUTPATIENT)
Dept: LAB | Facility: HOSPITAL | Age: 53
End: 2023-11-28
Attending: PSYCHIATRY & NEUROLOGY
Payer: COMMERCIAL

## 2023-11-28 ENCOUNTER — OFFICE VISIT (OUTPATIENT)
Dept: NEUROLOGY | Facility: CLINIC | Age: 53
End: 2023-11-28
Payer: COMMERCIAL

## 2023-11-28 ENCOUNTER — DOCUMENTATION ONLY (OUTPATIENT)
Dept: NEUROLOGY | Facility: CLINIC | Age: 53
End: 2023-11-28

## 2023-11-28 VITALS
SYSTOLIC BLOOD PRESSURE: 136 MMHG | HEART RATE: 69 BPM | HEIGHT: 69 IN | BODY MASS INDEX: 25.93 KG/M2 | DIASTOLIC BLOOD PRESSURE: 83 MMHG | WEIGHT: 175.06 LBS

## 2023-11-28 DIAGNOSIS — D84.9 IMMUNOSUPPRESSION: ICD-10-CM

## 2023-11-28 DIAGNOSIS — E55.9 VITAMIN D DEFICIENCY: ICD-10-CM

## 2023-11-28 DIAGNOSIS — Z71.89 COUNSELING REGARDING GOALS OF CARE: ICD-10-CM

## 2023-11-28 DIAGNOSIS — Z29.89 IMMUNOTHERAPY: ICD-10-CM

## 2023-11-28 DIAGNOSIS — Z29.89 PROPHYLACTIC IMMUNOTHERAPY: ICD-10-CM

## 2023-11-28 DIAGNOSIS — G35 MS (MULTIPLE SCLEROSIS): Primary | ICD-10-CM

## 2023-11-28 DIAGNOSIS — G35 MS (MULTIPLE SCLEROSIS): ICD-10-CM

## 2023-11-28 LAB
25(OH)D3+25(OH)D2 SERPL-MCNC: 50 NG/ML (ref 30–96)
ALBUMIN SERPL BCP-MCNC: 4.1 G/DL (ref 3.5–5.2)
ALP SERPL-CCNC: 62 U/L (ref 55–135)
ALT SERPL W/O P-5'-P-CCNC: 29 U/L (ref 10–44)
ANION GAP SERPL CALC-SCNC: 10 MMOL/L (ref 8–16)
AST SERPL-CCNC: 28 U/L (ref 10–40)
BASOPHILS # BLD AUTO: 0.04 K/UL (ref 0–0.2)
BASOPHILS NFR BLD: 1.2 % (ref 0–1.9)
BILIRUB SERPL-MCNC: 0.6 MG/DL (ref 0.1–1)
BUN SERPL-MCNC: 16 MG/DL (ref 6–20)
CALCIUM SERPL-MCNC: 10.1 MG/DL (ref 8.7–10.5)
CHLORIDE SERPL-SCNC: 108 MMOL/L (ref 95–110)
CO2 SERPL-SCNC: 25 MMOL/L (ref 23–29)
CREAT SERPL-MCNC: 0.9 MG/DL (ref 0.5–1.4)
DIFFERENTIAL METHOD: ABNORMAL
EOSINOPHIL # BLD AUTO: 0.1 K/UL (ref 0–0.5)
EOSINOPHIL NFR BLD: 3.5 % (ref 0–8)
ERYTHROCYTE [DISTWIDTH] IN BLOOD BY AUTOMATED COUNT: 12.3 % (ref 11.5–14.5)
EST. GFR  (NO RACE VARIABLE): >60 ML/MIN/1.73 M^2
GLUCOSE SERPL-MCNC: 100 MG/DL (ref 70–110)
HBV CORE AB SERPL QL IA: NORMAL
HBV SURFACE AG SERPL QL IA: NORMAL
HCT VFR BLD AUTO: 46.5 % (ref 40–54)
HGB BLD-MCNC: 15.3 G/DL (ref 14–18)
IGA SERPL-MCNC: 186 MG/DL (ref 40–350)
IGG SERPL-MCNC: 1223 MG/DL (ref 650–1600)
IGM SERPL-MCNC: 110 MG/DL (ref 50–300)
IMM GRANULOCYTES # BLD AUTO: 0.01 K/UL (ref 0–0.04)
IMM GRANULOCYTES NFR BLD AUTO: 0.3 % (ref 0–0.5)
LYMPHOCYTES # BLD AUTO: 1.1 K/UL (ref 1–4.8)
LYMPHOCYTES NFR BLD: 33.5 % (ref 18–48)
MCH RBC QN AUTO: 30 PG (ref 27–31)
MCHC RBC AUTO-ENTMCNC: 32.9 G/DL (ref 32–36)
MCV RBC AUTO: 91 FL (ref 82–98)
MONOCYTES # BLD AUTO: 0.4 K/UL (ref 0.3–1)
MONOCYTES NFR BLD: 10.3 % (ref 4–15)
NEUTROPHILS # BLD AUTO: 1.7 K/UL (ref 1.8–7.7)
NEUTROPHILS NFR BLD: 51.2 % (ref 38–73)
NRBC BLD-RTO: 0 /100 WBC
PLATELET # BLD AUTO: 277 K/UL (ref 150–450)
PMV BLD AUTO: 9.8 FL (ref 9.2–12.9)
POTASSIUM SERPL-SCNC: 5 MMOL/L (ref 3.5–5.1)
PROT SERPL-MCNC: 7.5 G/DL (ref 6–8.4)
RBC # BLD AUTO: 5.1 M/UL (ref 4.6–6.2)
SODIUM SERPL-SCNC: 143 MMOL/L (ref 136–145)
WBC # BLD AUTO: 3.4 K/UL (ref 3.9–12.7)

## 2023-11-28 PROCEDURE — 3079F PR MOST RECENT DIASTOLIC BLOOD PRESSURE 80-89 MM HG: ICD-10-PCS | Mod: CPTII,S$GLB,, | Performed by: PSYCHIATRY & NEUROLOGY

## 2023-11-28 PROCEDURE — 3075F SYST BP GE 130 - 139MM HG: CPT | Mod: CPTII,S$GLB,, | Performed by: PSYCHIATRY & NEUROLOGY

## 2023-11-28 PROCEDURE — 82784 ASSAY IGA/IGD/IGG/IGM EACH: CPT | Mod: 59 | Performed by: PSYCHIATRY & NEUROLOGY

## 2023-11-28 PROCEDURE — 99214 PR OFFICE/OUTPT VISIT, EST, LEVL IV, 30-39 MIN: ICD-10-PCS | Mod: S$GLB,,, | Performed by: PSYCHIATRY & NEUROLOGY

## 2023-11-28 PROCEDURE — 99999 PR PBB SHADOW E&M-EST. PATIENT-LVL IV: CPT | Mod: PBBFAC,,, | Performed by: PSYCHIATRY & NEUROLOGY

## 2023-11-28 PROCEDURE — 80053 COMPREHEN METABOLIC PANEL: CPT | Performed by: PSYCHIATRY & NEUROLOGY

## 2023-11-28 PROCEDURE — 86704 HEP B CORE ANTIBODY TOTAL: CPT | Performed by: PSYCHIATRY & NEUROLOGY

## 2023-11-28 PROCEDURE — 87340 HEPATITIS B SURFACE AG IA: CPT | Performed by: PSYCHIATRY & NEUROLOGY

## 2023-11-28 PROCEDURE — 1159F PR MEDICATION LIST DOCUMENTED IN MEDICAL RECORD: ICD-10-PCS | Mod: CPTII,S$GLB,, | Performed by: PSYCHIATRY & NEUROLOGY

## 2023-11-28 PROCEDURE — 1160F PR REVIEW ALL MEDS BY PRESCRIBER/CLIN PHARMACIST DOCUMENTED: ICD-10-PCS | Mod: CPTII,S$GLB,, | Performed by: PSYCHIATRY & NEUROLOGY

## 2023-11-28 PROCEDURE — 3075F PR MOST RECENT SYSTOLIC BLOOD PRESS GE 130-139MM HG: ICD-10-PCS | Mod: CPTII,S$GLB,, | Performed by: PSYCHIATRY & NEUROLOGY

## 2023-11-28 PROCEDURE — 36415 COLL VENOUS BLD VENIPUNCTURE: CPT | Performed by: PSYCHIATRY & NEUROLOGY

## 2023-11-28 PROCEDURE — 1160F RVW MEDS BY RX/DR IN RCRD: CPT | Mod: CPTII,S$GLB,, | Performed by: PSYCHIATRY & NEUROLOGY

## 2023-11-28 PROCEDURE — 99999 PR PBB SHADOW E&M-EST. PATIENT-LVL IV: ICD-10-PCS | Mod: PBBFAC,,, | Performed by: PSYCHIATRY & NEUROLOGY

## 2023-11-28 PROCEDURE — 82306 VITAMIN D 25 HYDROXY: CPT | Performed by: PSYCHIATRY & NEUROLOGY

## 2023-11-28 PROCEDURE — 3008F PR BODY MASS INDEX (BMI) DOCUMENTED: ICD-10-PCS | Mod: CPTII,S$GLB,, | Performed by: PSYCHIATRY & NEUROLOGY

## 2023-11-28 PROCEDURE — 1159F MED LIST DOCD IN RCRD: CPT | Mod: CPTII,S$GLB,, | Performed by: PSYCHIATRY & NEUROLOGY

## 2023-11-28 PROCEDURE — 3008F BODY MASS INDEX DOCD: CPT | Mod: CPTII,S$GLB,, | Performed by: PSYCHIATRY & NEUROLOGY

## 2023-11-28 PROCEDURE — 85025 COMPLETE CBC W/AUTO DIFF WBC: CPT | Performed by: PSYCHIATRY & NEUROLOGY

## 2023-11-28 PROCEDURE — 3079F DIAST BP 80-89 MM HG: CPT | Mod: CPTII,S$GLB,, | Performed by: PSYCHIATRY & NEUROLOGY

## 2023-11-28 PROCEDURE — 99214 OFFICE O/P EST MOD 30 MIN: CPT | Mod: S$GLB,,, | Performed by: PSYCHIATRY & NEUROLOGY

## 2023-11-28 RX ORDER — ACETAMINOPHEN 500 MG
1 TABLET ORAL
COMMUNITY

## 2023-11-28 NOTE — PROGRESS NOTES
Subjective:          Patient ID: Helio Nolasco is a 53 y.o. male who presents today for a routine clinic visit for MS.      MS HPI:  DMT: ocrelizumab June / December   Side effects from DMT? No  Taking vitamin D3 as recommended? Yes -    Next Ocrevus 12/22 at [facility]  Vitamin D 2000 IU daily  Had MRI Brain in October at [facility] -- he has report on his phone -- comparison to 2022 imaging was done; no new lesions;   Has arthritis joint pain - not new; works in maintenance  Sleeps well  No frequent infections or serious  Works full time in [employer]     Medications:  Current Outpatient Medications   Medication Sig    calcium carbonate-vit D3-min 600 mg calcium- 400 unit Tab Take 1 tablet by mouth once daily.    cetirizine (ZYRTEC) 10 MG tablet Take 10 mg by mouth as needed.     cholecalciferol, vitamin D3, (VITAMIN D3) 50 mcg (2,000 unit) Cap capsule 1 capsule.    cyanocobalamin 2000 MCG tablet Take 1,000 mcg by mouth once daily.     escitalopram (LEXAPRO) 10 MG tablet Take 10 mg by mouth every evening.     FOLIC ACID/MV,FE,OTHER MIN (CENTRUM ORAL) Take by mouth once daily.     glucosamine/chondroitin/C/Chip (GLUCOSAMINE 1500 COMPLEX ORAL) Take by mouth.    latanoprost 0.005 % ophthalmic solution Place 1 drop into both eyes every evening.    ocrelizumab (OCREVUS) 30 mg/mL Soln Infuse Ocrevus 600mg in 500mL of 0.9% NaCl IV every 24 weeks. Pre-meds: Solumedrol 100mg IVPB, Benadryl 50mg IVP, Tylenol 1000mg PO, Pepcid 20mg IVP. Pt is due for his next infusion on 11/26/22.    omega-3/dha/epa/fish oil (OMEGA-3 ORAL) Take 500 mg by mouth once daily.     No current facility-administered medications for this visit.       SOCIAL HISTORY  Social History     Tobacco Use    Smoking status: Former     Types: Cigars    Smokeless tobacco: Never    Tobacco comments:     Pt stated he quit last yr 2020   Substance Use Topics    Alcohol use: Yes     Alcohol/week: 0.0 standard drinks of alcohol     Comment:  socially    Drug use: No       Living arrangements - the patient lives with their family.        11/21/2023     9:55 AM   REVIEW OF SYMPTOMS   Do you feel abnormally tired on most days? No   Do you feel you generally sleep well? Yes   Do you have difficulty controlling your bladder?  No   Do you have difficulty controlling your bowels?  No   Do you have frequent muscle cramps, tightness or spasms in your limbs?  No   Do you have new visual symptoms?  No   Do you have worsening difficulty with your memory or thinking? No   Do you have worsening symptoms of anxiety or depression?  No   For patients who walk, Do you have more difficulty walking?  No   Have you fallen since your last visit?  No   For patients who use wheelchairs: Do you have any skin wounds or breakdown? Not Applicable   Do you have difficulty using your hands?  No   Do you have shooting or burning pain? No   Do you have difficulty with sexual function?  No   If you are sexually active, are you using birth control? Y/N  N/A Not Applicable   Do you often choke when swallowing liquids or solid food?  No   Do you experience worsening symptoms when overheated? Yes   Do you need any new equipment such as a wheelchair, walker or shower chair? No   Do you receive co-pay financial assistance for your principal MS medicine? Yes   Would you be interested in participating in an MS research trial in the future? No   For patients on Gilenya, Tecfidera, Aubagio, Rituxan, Ocrevus, Tysabri, Lemtrada or Methotrexate, are you aware that you should NOT receive live virus vaccines?  Yes   Do you feel you have adequate family/friend support?  Yes   Do you have health insurance?   Yes   Are you currently employed? Yes   Do you receive SSDI/SSI?  Not Applicable   Do you use marijuana or cannabis products? No   Have you been diagnosed with a urinary tract infection since your last visit here? No   Have you been diagnosed with a respiratory tract infection since your last visit  here? No   Have you been to the emergency room since your last visit here? No   Have you been hospitalized since your last visit here?  No                Objective:          11/10/2022    12:01 AM 11/28/2023    12:01 AM   Timed 25 Foot Walk:   Did patient wear an AFO? No No   Was assistive device used? No No   Time for 25 Foot Walk (seconds) 3.69 3.8   Time for 25 Foot Walk (seconds) 3.68        Neurologic Exam    MENTAL STATUS: grossly intact  CRANIAL NERVE EXAM: There is no internuclear ophthalmoplegia. Extraocular   muscles are intact.  No facial   asymmetry.There is no dysarthria.   MOTOR EXAM: Normal bulk and tone throughout UE and LE bilaterally. Rapid sequential movements are normal. Strength is 5/5 in all groups   in the lower extremities and upper extremities.   REFLEXES: Symmetric and 2+ throughout in all 4 extremities.   SENSORY EXAM: Normal to vibration t/o  COORDINATION: Normal finger-to-nose exam.   GAIT: Narrow based and stable.      Imaging:     Results for orders placed during the hospital encounter of 11/03/22    MRI Brain Demyelinating Without Contrast    Impression  Brain appears stable from prior exam, again demonstrating findings compatible with the reported history of multiple sclerosis.  No new discrete lesions to indicate ongoing demyelination.      Electronically signed by: Logan Hutson MD  Date:    11/03/2022  Time:    10:17    Results for orders placed during the hospital encounter of 04/12/21    MRI Cervical Spine Demyelinating Without Contrast    Impression  MRI brain: No significant change from prior with continued several scattered punctate foci of T2 FLAIR signal hyperintensity throughout the brain parenchyma while nonspecific remain concerning for mild degree of prior demyelinating plaque burden.    No definite new lesion or diffusion signal abnormality to suggest significant interval or active demyelination.    Clinical correlation and follow-up advised.    MRI cervical spine:  "Continued few scattered foci of T2/stir signal hyperintensity within the cervical cord in light of history and intracranial findings concerning for prior demyelination.  No definite new cord signal abnormality to suggest interval or active cervical cord demyelination in light of history    Multilevel degenerative changes cervical spine slightly progressed from prior.    Please see above for additional details.      Electronically signed by: Jose A Carter DO  Date:    04/12/2021  Time:    11:55    No results found for this or any previous visit.    Results for orders placed during the hospital encounter of 03/13/20    MRI Brain Demyelinating W W/O Contrast    Impression  Stable, scattered T2/FLAIR hyperintense lesions throughout the supratentorial white matter, medulla, and upper cervical cord in keeping with patient's reported history of multiple sclerosis.  No evidence of new or ongoing demyelination.    Electronically signed by resident: Eric Hackett  Date:    03/13/2020  Time:    13:14    Electronically signed by: Thiago Larose MD  Date:    03/13/2020  Time:    14:46    No results found for this or any previous visit.    No results found for this or any previous visit.        Labs:     Lab Results   Component Value Date    MSSKIVPQ68EE 47 03/31/2021    CPTEEHLG96PQ 51 09/03/2020    BAAGGMHC12SW 54 07/20/2019     No results found for: "JCVINDEX", "JCVANTIBODY"  Lab Results   Component Value Date    MX3HGHJR 55.1 08/12/2019    ABSOLUTECD3 546 (L) 08/12/2019    IV8TOMLW 15.4 08/12/2019    ABSOLUTECD8 153 (L) 08/12/2019    EY2RQXBY 39.2 08/12/2019    ABSOLUTECD4 389 08/12/2019    LABCD48 2.54 08/12/2019     Lab Results   Component Value Date    WBC 3.12 (L) 06/03/2023    RBC 5.04 06/03/2023    HGB 14.4 06/03/2023    HCT 46.6 06/03/2023    MCV 93 06/03/2023    MCH 28.6 06/03/2023    MCHC 30.9 (L) 06/03/2023    RDW 12.2 06/03/2023     06/03/2023    MPV 9.7 06/03/2023    GRAN 1.5 (L) 06/03/2023    GRAN 47.1 " 06/03/2023    LYMPH 1.1 06/03/2023    LYMPH 33.7 06/03/2023    MONO 0.4 06/03/2023    MONO 12.5 06/03/2023    EOS 0.2 06/03/2023    BASO 0.04 06/03/2023    EOSINOPHIL 5.1 06/03/2023    BASOPHIL 1.3 06/03/2023     Sodium   Date Value Ref Range Status   04/15/2023 140 136 - 145 mmol/L Final     Potassium   Date Value Ref Range Status   04/15/2023 4.3 3.5 - 5.1 mmol/L Final     Chloride   Date Value Ref Range Status   04/15/2023 105 95 - 110 mmol/L Final     CO2   Date Value Ref Range Status   04/15/2023 26 23 - 29 mmol/L Final     Glucose   Date Value Ref Range Status   04/15/2023 95 70 - 110 mg/dL Final     BUN   Date Value Ref Range Status   04/15/2023 12 6 - 20 mg/dL Final     Creatinine   Date Value Ref Range Status   04/15/2023 0.8 0.5 - 1.4 mg/dL Final     Calcium   Date Value Ref Range Status   04/15/2023 9.2 8.7 - 10.5 mg/dL Final     Total Protein   Date Value Ref Range Status   04/15/2023 7.1 6.0 - 8.4 g/dL Final     Albumin   Date Value Ref Range Status   04/15/2023 4.0 3.5 - 5.2 g/dL Final     Total Bilirubin   Date Value Ref Range Status   04/15/2023 0.4 0.1 - 1.0 mg/dL Final     Comment:     For infants and newborns, interpretation of results should be based  on gestational age, weight and in agreement with clinical  observations.    Premature Infant recommended reference ranges:  Up to 24 hours.............<8.0 mg/dL  Up to 48 hours............<12.0 mg/dL  3-5 days..................<15.0 mg/dL  6-29 days.................<15.0 mg/dL       Alkaline Phosphatase   Date Value Ref Range Status   04/15/2023 64 55 - 135 U/L Final     AST   Date Value Ref Range Status   04/15/2023 21 10 - 40 U/L Final     ALT   Date Value Ref Range Status   04/15/2023 28 10 - 44 U/L Final     Anion Gap   Date Value Ref Range Status   04/15/2023 9 8 - 16 mmol/L Final     eGFR if    Date Value Ref Range Status   08/12/2019 >60 >60 mL/min/1.73 m^2 Final     eGFR if non    Date Value Ref Range Status    08/12/2019 >60 >60 mL/min/1.73 m^2 Final     Comment:     Calculation used to obtain the estimated glomerular filtration  rate (eGFR) is the CKD-EPI equation.        Lab Results   Component Value Date    HEPBSAG Non-reactive 04/15/2023    HEPBSAB <3.00 04/15/2023    HEPBSAB Non-reactive 04/15/2023    HEPBCAB Non-reactive 04/15/2023           MS Impression and Plan:     NEURO MULTIPLE SCLEROSIS IMPRESSION:   MS Status:     Number of relapses in the past year?:  0    Clinical Progression:  Clinically Stable    MRI Progression:  Stable  Plan:     DMT:  No change in management    Symptom Management:  No change in symptom management     VIVIEN  Labs today   Ocrevus in December   F/u in 1 year [facility]         Problem List Items Addressed This Visit          Unprioritized    MS (multiple sclerosis) - Primary    Relevant Orders    CBC Auto Differential    Comprehensive Metabolic Panel    Immunoglobulins (IgG, IgA, IgM) Quantitative    Hepatitis B Core Antibody, Total    Hepatitis B Surface Antigen    Counseling regarding goals of care    Immunosuppression     Other Visit Diagnoses       Immunotherapy        Relevant Orders    CBC Auto Differential    Comprehensive Metabolic Panel    Immunoglobulins (IgG, IgA, IgM) Quantitative    Hepatitis B Core Antibody, Total    Hepatitis B Surface Antigen    Vitamin D deficiency        Relevant Orders    Vitamin D    Prophylactic immunotherapy                Carmenza Junior MD    I spent a total of 35 minutes on the day of the visit.This includes face to face time and non-face to face time preparing to see the patient (eg, review of tests), obtaining and/or reviewing separately obtained history, documenting clinical information in the electronic or other health record, independently interpreting results and communicating results to the patient/family/caregiver, or care coordinator.

## 2023-11-28 NOTE — Clinical Note
I added external MRI brain; pls FAX to [facility] and pls add that date should be October 2024, and pls let pt know; thanks

## 2023-11-30 ENCOUNTER — DOCUMENTATION ONLY (OUTPATIENT)
Dept: NEUROLOGY | Facility: CLINIC | Age: 53
End: 2023-11-30
Payer: COMMERCIAL

## 2023-11-30 ENCOUNTER — TELEPHONE (OUTPATIENT)
Dept: NEUROLOGY | Facility: CLINIC | Age: 53
End: 2023-11-30
Payer: COMMERCIAL

## 2023-11-30 NOTE — TELEPHONE ENCOUNTER
LVM telling pt that Dr. Junior put in an order for pt to get a brain MRI done in October 2024 and I sent over the orders to [facility] in [place]. I will also send pt a message via portal to explain.

## 2023-12-04 ENCOUNTER — TELEPHONE (OUTPATIENT)
Dept: NEUROLOGY | Facility: CLINIC | Age: 53
End: 2023-12-04
Payer: COMMERCIAL

## 2023-12-04 NOTE — TELEPHONE ENCOUNTER
----- Message from Jones Davila PharmD sent at 12/4/2023  4:07 PM CST -----  Regarding: FW: Prior auth  Contact: [phone]    ----- Message -----  From: Sanna Sanders RN  Sent: 12/4/2023   3:43 PM CST  To: Jones Davila PharmD  Subject: FW: Prior auth                                     ----- Message -----  From: Elly Barrientos  Sent: 12/4/2023   3:27 PM CST  To: Amber HOLLINS Staff  Subject: Prior auth                                       Judith is maury from [facility] to advised staff that the prior authorization for ocrelizumab (OCREVUS) 30 mg/mL Soln is  approved.

## 2024-01-22 ENCOUNTER — PATIENT MESSAGE (OUTPATIENT)
Dept: PSYCHIATRY | Facility: CLINIC | Age: 54
End: 2024-01-22
Payer: COMMERCIAL

## 2024-01-26 ENCOUNTER — DOCUMENTATION ONLY (OUTPATIENT)
Dept: NEUROLOGY | Facility: CLINIC | Age: 54
End: 2024-01-26
Payer: COMMERCIAL

## 2024-03-26 ENCOUNTER — PATIENT MESSAGE (OUTPATIENT)
Dept: PSYCHIATRY | Facility: CLINIC | Age: 54
End: 2024-03-26
Payer: COMMERCIAL

## 2024-05-02 ENCOUNTER — PATIENT MESSAGE (OUTPATIENT)
Dept: PSYCHIATRY | Facility: CLINIC | Age: 54
End: 2024-05-02
Payer: COMMERCIAL

## 2024-05-09 ENCOUNTER — PATIENT MESSAGE (OUTPATIENT)
Dept: NEUROLOGY | Facility: CLINIC | Age: 54
End: 2024-05-09
Payer: COMMERCIAL

## 2024-05-09 DIAGNOSIS — Z29.89 IMMUNOTHERAPY: ICD-10-CM

## 2024-05-09 DIAGNOSIS — G35 MS (MULTIPLE SCLEROSIS): Primary | ICD-10-CM

## 2024-05-18 ENCOUNTER — LAB VISIT (OUTPATIENT)
Dept: LAB | Facility: HOSPITAL | Age: 54
End: 2024-05-18
Attending: PSYCHIATRY & NEUROLOGY
Payer: COMMERCIAL

## 2024-05-18 DIAGNOSIS — G35 MS (MULTIPLE SCLEROSIS): ICD-10-CM

## 2024-05-18 DIAGNOSIS — Z29.89 IMMUNOTHERAPY: ICD-10-CM

## 2024-05-18 LAB
ALBUMIN SERPL BCP-MCNC: 4 G/DL (ref 3.5–5.2)
ALP SERPL-CCNC: 62 U/L (ref 55–135)
ALT SERPL W/O P-5'-P-CCNC: 27 U/L (ref 10–44)
ANION GAP SERPL CALC-SCNC: 9 MMOL/L (ref 8–16)
AST SERPL-CCNC: 21 U/L (ref 10–40)
BASOPHILS # BLD AUTO: 0.04 K/UL (ref 0–0.2)
BASOPHILS NFR BLD: 1.1 % (ref 0–1.9)
BILIRUB SERPL-MCNC: 0.6 MG/DL (ref 0.1–1)
BUN SERPL-MCNC: 14 MG/DL (ref 6–20)
CALCIUM SERPL-MCNC: 9.5 MG/DL (ref 8.7–10.5)
CHLORIDE SERPL-SCNC: 106 MMOL/L (ref 95–110)
CO2 SERPL-SCNC: 26 MMOL/L (ref 23–29)
CREAT SERPL-MCNC: 0.8 MG/DL (ref 0.5–1.4)
DIFFERENTIAL METHOD BLD: ABNORMAL
EOSINOPHIL # BLD AUTO: 0.2 K/UL (ref 0–0.5)
EOSINOPHIL NFR BLD: 5.3 % (ref 0–8)
ERYTHROCYTE [DISTWIDTH] IN BLOOD BY AUTOMATED COUNT: 12.2 % (ref 11.5–14.5)
EST. GFR  (NO RACE VARIABLE): >60 ML/MIN/1.73 M^2
GLUCOSE SERPL-MCNC: 102 MG/DL (ref 70–110)
HBV CORE AB SERPL QL IA: NORMAL
HBV SURFACE AG SERPL QL IA: NORMAL
HCT VFR BLD AUTO: 47.1 % (ref 40–54)
HGB BLD-MCNC: 15.3 G/DL (ref 14–18)
IGA SERPL-MCNC: 167 MG/DL (ref 40–350)
IGG SERPL-MCNC: 1100 MG/DL (ref 650–1600)
IGM SERPL-MCNC: 101 MG/DL (ref 50–300)
IMM GRANULOCYTES # BLD AUTO: 0 K/UL (ref 0–0.04)
IMM GRANULOCYTES NFR BLD AUTO: 0 % (ref 0–0.5)
LYMPHOCYTES # BLD AUTO: 1.2 K/UL (ref 1–4.8)
LYMPHOCYTES NFR BLD: 32.2 % (ref 18–48)
MCH RBC QN AUTO: 29.3 PG (ref 27–31)
MCHC RBC AUTO-ENTMCNC: 32.5 G/DL (ref 32–36)
MCV RBC AUTO: 90 FL (ref 82–98)
MONOCYTES # BLD AUTO: 0.4 K/UL (ref 0.3–1)
MONOCYTES NFR BLD: 11.5 % (ref 4–15)
NEUTROPHILS # BLD AUTO: 1.8 K/UL (ref 1.8–7.7)
NEUTROPHILS NFR BLD: 49.9 % (ref 38–73)
NRBC BLD-RTO: 0 /100 WBC
PLATELET # BLD AUTO: 276 K/UL (ref 150–450)
PMV BLD AUTO: 9.8 FL (ref 9.2–12.9)
POTASSIUM SERPL-SCNC: 4.4 MMOL/L (ref 3.5–5.1)
PROT SERPL-MCNC: 7 G/DL (ref 6–8.4)
RBC # BLD AUTO: 5.23 M/UL (ref 4.6–6.2)
SODIUM SERPL-SCNC: 141 MMOL/L (ref 136–145)
WBC # BLD AUTO: 3.57 K/UL (ref 3.9–12.7)

## 2024-05-18 PROCEDURE — 82784 ASSAY IGA/IGD/IGG/IGM EACH: CPT | Mod: 59 | Performed by: PSYCHIATRY & NEUROLOGY

## 2024-05-18 PROCEDURE — 87340 HEPATITIS B SURFACE AG IA: CPT | Performed by: PSYCHIATRY & NEUROLOGY

## 2024-05-18 PROCEDURE — 85025 COMPLETE CBC W/AUTO DIFF WBC: CPT | Performed by: PSYCHIATRY & NEUROLOGY

## 2024-05-18 PROCEDURE — 80053 COMPREHEN METABOLIC PANEL: CPT | Performed by: PSYCHIATRY & NEUROLOGY

## 2024-05-18 PROCEDURE — 36415 COLL VENOUS BLD VENIPUNCTURE: CPT | Performed by: PSYCHIATRY & NEUROLOGY

## 2024-05-18 PROCEDURE — 86704 HEP B CORE ANTIBODY TOTAL: CPT | Performed by: PSYCHIATRY & NEUROLOGY

## 2024-06-09 ENCOUNTER — HOSPITAL ENCOUNTER (EMERGENCY)
Facility: HOSPITAL | Age: 54
Discharge: HOME | End: 2024-06-09
Attending: EMERGENCY MEDICINE
Payer: COMMERCIAL

## 2024-06-09 VITALS
WEIGHT: 175 LBS | HEART RATE: 78 BPM | SYSTOLIC BLOOD PRESSURE: 153 MMHG | TEMPERATURE: 98 F | BODY MASS INDEX: 25.92 KG/M2 | DIASTOLIC BLOOD PRESSURE: 90 MMHG | HEIGHT: 69 IN | RESPIRATION RATE: 14 BRPM | OXYGEN SATURATION: 99 %

## 2024-06-09 DIAGNOSIS — R07.9 CHEST PAIN: ICD-10-CM

## 2024-06-09 DIAGNOSIS — R51.9 ACUTE NONINTRACTABLE HEADACHE, UNSPECIFIED HEADACHE TYPE: ICD-10-CM

## 2024-06-09 DIAGNOSIS — R07.89 ATYPICAL CHEST PAIN: Primary | ICD-10-CM

## 2024-06-09 LAB
ALBUMIN SERPL BCP-MCNC: 3.9 G/DL (ref 3.5–5.2)
ALP SERPL-CCNC: 59 U/L (ref 55–135)
ALT SERPL W/O P-5'-P-CCNC: 39 U/L (ref 10–44)
ANION GAP SERPL CALC-SCNC: 14 MMOL/L (ref 8–16)
AST SERPL-CCNC: 32 U/L (ref 10–40)
BASOPHILS # BLD AUTO: 0.06 K/UL (ref 0–0.2)
BASOPHILS NFR BLD: 1.3 % (ref 0–1.9)
BILIRUB SERPL-MCNC: 0.3 MG/DL (ref 0.1–1)
BNP SERPL-MCNC: 13 PG/ML (ref 0–99)
BUN SERPL-MCNC: 14 MG/DL (ref 6–20)
CALCIUM SERPL-MCNC: 9.2 MG/DL (ref 8.7–10.5)
CHLORIDE SERPL-SCNC: 110 MMOL/L (ref 95–110)
CO2 SERPL-SCNC: 20 MMOL/L (ref 23–29)
CREAT SERPL-MCNC: 0.9 MG/DL (ref 0.5–1.4)
D DIMER PPP IA.FEU-MCNC: 0.24 MG/L FEU
DIFFERENTIAL METHOD BLD: ABNORMAL
EOSINOPHIL # BLD AUTO: 0.2 K/UL (ref 0–0.5)
EOSINOPHIL NFR BLD: 5.1 % (ref 0–8)
ERYTHROCYTE [DISTWIDTH] IN BLOOD BY AUTOMATED COUNT: 11.9 % (ref 11.5–14.5)
EST. GFR  (NO RACE VARIABLE): >60 ML/MIN/1.73 M^2
GLUCOSE SERPL-MCNC: 118 MG/DL (ref 70–110)
HCT VFR BLD AUTO: 46.1 % (ref 40–54)
HGB BLD-MCNC: 15 G/DL (ref 14–18)
IMM GRANULOCYTES # BLD AUTO: 0.01 K/UL (ref 0–0.04)
IMM GRANULOCYTES NFR BLD AUTO: 0.2 % (ref 0–0.5)
LIPASE SERPL-CCNC: 39 U/L (ref 4–60)
LYMPHOCYTES # BLD AUTO: 2.2 K/UL (ref 1–4.8)
LYMPHOCYTES NFR BLD: 47.7 % (ref 18–48)
MCH RBC QN AUTO: 28.8 PG (ref 27–31)
MCHC RBC AUTO-ENTMCNC: 32.5 G/DL (ref 32–36)
MCV RBC AUTO: 89 FL (ref 82–98)
MONOCYTES # BLD AUTO: 0.4 K/UL (ref 0.3–1)
MONOCYTES NFR BLD: 9.7 % (ref 4–15)
NEUTROPHILS # BLD AUTO: 1.6 K/UL (ref 1.8–7.7)
NEUTROPHILS NFR BLD: 36 % (ref 38–73)
NRBC BLD-RTO: 0 /100 WBC
PLATELET # BLD AUTO: 285 K/UL (ref 150–450)
PMV BLD AUTO: 9.1 FL (ref 9.2–12.9)
POTASSIUM SERPL-SCNC: 3.9 MMOL/L (ref 3.5–5.1)
PROT SERPL-MCNC: 7.1 G/DL (ref 6–8.4)
RBC # BLD AUTO: 5.21 M/UL (ref 4.6–6.2)
SODIUM SERPL-SCNC: 144 MMOL/L (ref 136–145)
TROPONIN I SERPL DL<=0.01 NG/ML-MCNC: 0.01 NG/ML (ref 0–0.03)
TROPONIN I SERPL DL<=0.01 NG/ML-MCNC: <0.006 NG/ML (ref 0–0.03)
WBC # BLD AUTO: 4.53 K/UL (ref 3.9–12.7)

## 2024-06-09 PROCEDURE — 63600175 PHARM REV CODE 636 W HCPCS: Performed by: PHYSICIAN ASSISTANT

## 2024-06-09 PROCEDURE — 93005 ELECTROCARDIOGRAM TRACING: CPT

## 2024-06-09 PROCEDURE — 80053 COMPREHEN METABOLIC PANEL: CPT | Performed by: EMERGENCY MEDICINE

## 2024-06-09 PROCEDURE — 96374 THER/PROPH/DIAG INJ IV PUSH: CPT

## 2024-06-09 PROCEDURE — 25000003 PHARM REV CODE 250: Performed by: EMERGENCY MEDICINE

## 2024-06-09 PROCEDURE — 85025 COMPLETE CBC W/AUTO DIFF WBC: CPT | Performed by: EMERGENCY MEDICINE

## 2024-06-09 PROCEDURE — 83880 ASSAY OF NATRIURETIC PEPTIDE: CPT | Performed by: EMERGENCY MEDICINE

## 2024-06-09 PROCEDURE — 84484 ASSAY OF TROPONIN QUANT: CPT | Performed by: EMERGENCY MEDICINE

## 2024-06-09 PROCEDURE — 96375 TX/PRO/DX INJ NEW DRUG ADDON: CPT

## 2024-06-09 PROCEDURE — 63600175 PHARM REV CODE 636 W HCPCS: Performed by: EMERGENCY MEDICINE

## 2024-06-09 PROCEDURE — 83690 ASSAY OF LIPASE: CPT | Performed by: EMERGENCY MEDICINE

## 2024-06-09 PROCEDURE — 99285 EMERGENCY DEPT VISIT HI MDM: CPT | Mod: 25

## 2024-06-09 PROCEDURE — 85379 FIBRIN DEGRADATION QUANT: CPT | Performed by: EMERGENCY MEDICINE

## 2024-06-09 PROCEDURE — 25000242 PHARM REV CODE 250 ALT 637 W/ HCPCS: Performed by: EMERGENCY MEDICINE

## 2024-06-09 PROCEDURE — 25000003 PHARM REV CODE 250: Performed by: PHYSICIAN ASSISTANT

## 2024-06-09 PROCEDURE — 93010 ELECTROCARDIOGRAM REPORT: CPT | Mod: 76,,, | Performed by: INTERNAL MEDICINE

## 2024-06-09 PROCEDURE — 96361 HYDRATE IV INFUSION ADD-ON: CPT

## 2024-06-09 RX ORDER — MORPHINE SULFATE 4 MG/ML
4 INJECTION, SOLUTION INTRAMUSCULAR; INTRAVENOUS
Status: COMPLETED | OUTPATIENT
Start: 2024-06-09 | End: 2024-06-09

## 2024-06-09 RX ORDER — ASPIRIN 325 MG
325 TABLET ORAL
Status: COMPLETED | OUTPATIENT
Start: 2024-06-09 | End: 2024-06-09

## 2024-06-09 RX ORDER — NITROGLYCERIN 0.4 MG/1
0.4 TABLET SUBLINGUAL
Status: DISCONTINUED | OUTPATIENT
Start: 2024-06-09 | End: 2024-06-09

## 2024-06-09 RX ORDER — ALUMINUM HYDROXIDE, MAGNESIUM HYDROXIDE, AND SIMETHICONE 1200; 120; 1200 MG/30ML; MG/30ML; MG/30ML
30 SUSPENSION ORAL ONCE
Status: COMPLETED | OUTPATIENT
Start: 2024-06-09 | End: 2024-06-09

## 2024-06-09 RX ORDER — ACETAMINOPHEN 325 MG/1
650 TABLET ORAL
Status: COMPLETED | OUTPATIENT
Start: 2024-06-09 | End: 2024-06-09

## 2024-06-09 RX ORDER — METHOCARBAMOL 500 MG/1
1500 TABLET, FILM COATED ORAL
Status: COMPLETED | OUTPATIENT
Start: 2024-06-09 | End: 2024-06-09

## 2024-06-09 RX ORDER — ONDANSETRON HYDROCHLORIDE 2 MG/ML
4 INJECTION, SOLUTION INTRAVENOUS
Status: COMPLETED | OUTPATIENT
Start: 2024-06-09 | End: 2024-06-09

## 2024-06-09 RX ORDER — NITROGLYCERIN 0.4 MG/1
0.4 TABLET SUBLINGUAL
Status: COMPLETED | OUTPATIENT
Start: 2024-06-09 | End: 2024-06-09

## 2024-06-09 RX ORDER — LIDOCAINE HYDROCHLORIDE 20 MG/ML
15 SOLUTION OROPHARYNGEAL ONCE
Status: COMPLETED | OUTPATIENT
Start: 2024-06-09 | End: 2024-06-09

## 2024-06-09 RX ORDER — METHYLPREDNISOLONE SOD SUCC 125 MG
125 VIAL (EA) INJECTION
Status: COMPLETED | OUTPATIENT
Start: 2024-06-09 | End: 2024-06-09

## 2024-06-09 RX ORDER — METHOCARBAMOL 750 MG/1
1500 TABLET, FILM COATED ORAL 3 TIMES DAILY
Qty: 18 TABLET | Refills: 0 | Status: SHIPPED | OUTPATIENT
Start: 2024-06-09 | End: 2024-06-12

## 2024-06-09 RX ADMIN — LIDOCAINE HYDROCHLORIDE 15 ML: 20 SOLUTION ORAL at 05:06

## 2024-06-09 RX ADMIN — METHOCARBAMOL 1500 MG: 500 TABLET ORAL at 07:06

## 2024-06-09 RX ADMIN — METHYLPREDNISOLONE SODIUM SUCCINATE 125 MG: 125 INJECTION, POWDER, FOR SOLUTION INTRAMUSCULAR; INTRAVENOUS at 08:06

## 2024-06-09 RX ADMIN — ACETAMINOPHEN 650 MG: 325 TABLET ORAL at 05:06

## 2024-06-09 RX ADMIN — MORPHINE SULFATE 4 MG: 4 INJECTION INTRAVENOUS at 05:06

## 2024-06-09 RX ADMIN — ONDANSETRON 4 MG: 2 INJECTION INTRAMUSCULAR; INTRAVENOUS at 05:06

## 2024-06-09 RX ADMIN — SODIUM CHLORIDE 1000 ML: 9 INJECTION, SOLUTION INTRAVENOUS at 05:06

## 2024-06-09 RX ADMIN — ALUMINUM HYDROXIDE, MAGNESIUM HYDROXIDE, AND SIMETHICONE 30 ML: 200; 200; 20 SUSPENSION ORAL at 05:06

## 2024-06-09 RX ADMIN — ASPIRIN 325 MG ORAL TABLET 325 MG: 325 PILL ORAL at 05:06

## 2024-06-09 RX ADMIN — NITROGLYCERIN 0.4 MG: 0.4 TABLET SUBLINGUAL at 05:06

## 2024-06-09 NOTE — ED NOTES
Called to pt's room.  He states that chest feels heavy, numbness/tingling to face and left arm.  No relief with medication.  States that pain is 10/10.  Provider notified.  Repeat EKG repeated.

## 2024-06-09 NOTE — PROVIDER PROGRESS NOTES - EMERGENCY DEPT.
Encounter Date: 6/9/2024    ED Physician Progress Notes        Physician Note:   This is Jennifer Manuel PA-C dictating.  I assumed care of this patient from Dr. Nielsen at shift change pending 2nd troponin, D-dimer, lipase, chest x-ray results and patient re-evaluation.  Second troponin resulted normal, along with D-dimer and lipase.  Chest x-ray negative for any acute abnormality.  I re-evaluated the patient, he was still complaining of chest tightness and intermittent, spasm type chest pain.  He was treated with Robaxin with only mild improvement.  I reviewed all this test results with him, stating that his labs look good and I believe he will be stable for discharge, however patient states he was still feeling tightness in pain in his chest.  Reports sometimes he will have flare-ups of his MS that attack different areas in his body, never the same.  Last time he was treated with a steroid infusion when he had a flare-up.  I reviewed the case with my attending, Dr. Ramos, who recommended that we give the patient 1 dose of IV Solu-Medrol.  This is ordered and patient re-evaluated 1 hour later, stating significant improvement of pain.  Pain went from an 8/10 to a 5/10 after steroids.  At this point he does feel well enough to go home.  I counseled patient that he needs to have urgent follow up with his neurologist, call them tomorrow morning 1st thing to discuss his symptoms and return to the ED if any symptoms worsen before then.  Patient is agreeable, Dr. Ramos is agreeable.  Patient will be discharged at this time.     FYI: Patient with a low risk HEART score of 3 points. Chest pain is reproducible with deep breath and chest wall palpation.

## 2024-06-09 NOTE — ED PROVIDER NOTES
Encounter Date: 6/9/2024       History     Chief Complaint   Patient presents with    Chest Pain     Pt chief complaint is chest pain. Pt state got up to use restroom, when went back to sleep was having substernal chest pain that radiates down left arm      53-year-old male history of multiple sclerosis presents with chest pain.  Symptom onset right prior to arrival.  Patient states got up to use restroom and  began having retrosternal chest tightness radiating to the left arm and left neck.  The patient reports nausea but no vomiting.  Denies any dyspnea.  The patient has a poor drinking alcohol last night.  Denies any illicit drug use.  Denies any family history of cardiac disease.  Denies any history of tobacco use.  Reports feeling well prior to this event tonight.  Denies any cardiac history.  The patient also reports mild generalized headache.  Denies any weakness in the extremities.      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  Social History     Tobacco Use    Smoking status: Former     Types: Cigars    Smokeless tobacco: Never    Tobacco comments:     Pt stated he quit last yr 2020   Substance Use Topics    Alcohol use: Yes     Alcohol/week: 0.0 standard drinks of alcohol     Comment: socially    Drug use: No     Review of Systems   Constitutional:  Negative for chills and fever.   HENT:  Negative for congestion and sore throat.    Respiratory:  Negative for cough and shortness of breath.    Cardiovascular:  Positive for chest pain. Negative for palpitations and leg swelling.   Gastrointestinal:  Positive for nausea. Negative for abdominal pain, diarrhea and vomiting.   Musculoskeletal:  Negative for back pain.   Skin:   Negative for rash.   Neurological:  Positive for headaches. Negative for dizziness, weakness, light-headedness and numbness.   Psychiatric/Behavioral:  Negative for confusion.        Physical Exam     Initial Vitals [06/09/24 0455]   BP Pulse Resp Temp SpO2   (!) 142/89 91 (!) 24 98.3 °F (36.8 °C) 98 %      MAP       --         Physical Exam    Nursing note and vitals reviewed.  Constitutional: He appears well-developed. He is not diaphoretic.  Non-toxic appearance. He does not appear ill. No distress.   Conversation sitting upright.   HENT:   Head: Normocephalic.   Eyes: EOM are normal. Pupils are equal, round, and reactive to light. Right eye exhibits normal extraocular motion and no nystagmus. Left eye exhibits normal extraocular motion and no nystagmus. Right pupil is reactive. Left pupil is reactive.   Cardiovascular:  Normal rate and regular rhythm.           No murmur heard.  Pulmonary/Chest: Effort normal and breath sounds normal. He has no wheezes.   Abdominal: Abdomen is soft and flat. He exhibits no distension and no mass. There is no abdominal tenderness.   Musculoskeletal:         General: Normal range of motion.      Comments: No lower extremity edema or tenderness to palpation     Neurological: He is alert. He has normal strength. No cranial nerve deficit or sensory deficit.   No dysarthria.   Skin: Skin is warm.         ED Course   Procedures  Labs Reviewed   CBC W/ AUTO DIFFERENTIAL - Abnormal; Notable for the following components:       Result Value    MPV 9.1 (*)     Gran # (ANC) 1.6 (*)     Gran % 36.0 (*)     All other components within normal limits   COMPREHENSIVE METABOLIC PANEL - Abnormal; Notable for the following components:    CO2 20 (*)     Glucose 118 (*)     All other components within normal limits   TROPONIN I   B-TYPE NATRIURETIC PEPTIDE   TROPONIN I   LIPASE   D DIMER, QUANTITATIVE   LIPASE        ECG Results              EKG 12-lead (Final result)  Result time 06/09/24 12:07:36  "     Final result by Unknown User (06/09/24 12:07:36)                                      Imaging Results              X-Ray Chest AP Portable (Final result)  Result time 06/09/24 08:18:51      Final result by Taiwo Schroeder MD (06/09/24 08:18:51)                   Impression:      No acute cardiopulmonary finding identified on this single view.      Electronically signed by: Taiwo Schroeder MD  Date:    06/09/2024  Time:    08:18               Narrative:    EXAMINATION:  XR CHEST AP PORTABLE    CLINICAL HISTORY:  Provided history is "  Chest pain, unspecified".    TECHNIQUE:  One view of the chest.    COMPARISON:  None.    FINDINGS:  Cardiac wires overlie the chest.  Cardiomediastinal silhouette is not enlarged.  No focal consolidation.  No sizable pleural effusion.  No pneumothorax.                                       CT Head Without Contrast (Final result)  Result time 06/09/24 06:56:04      Final result by Taiwo Schroeder MD (06/09/24 06:56:04)                   Impression:      No CT evidence of acute intracranial abnormality.      Electronically signed by: Taiwo Schroeder MD  Date:    06/09/2024  Time:    06:56               Narrative:    EXAMINATION:  CT HEAD WITHOUT CONTRAST    CLINICAL HISTORY:  headache, LUE paresthesias.;    TECHNIQUE:  Low dose axial images were obtained through the head.  Coronal and sagittal reformations were also performed. Contrast was not administered.    COMPARISON:  11/03/2022.    FINDINGS:  No evidence of acute territorial infarct, hemorrhage, mass effect, or midline shift.    Ventricles are normal in size and configuration.    No displaced calvarial fracture.    Visualized paranasal sinuses and mastoid air cells are essentially clear.                                       Medications   aspirin tablet 325 mg (325 mg Oral Given 6/9/24 0503)   sodium chloride 0.9% bolus 1,000 mL 1,000 mL (0 mLs Intravenous Stopped 6/9/24 0600)   aluminum-magnesium hydroxide-simethicone " 200-200-20 mg/5 mL suspension 30 mL (30 mLs Oral Given 6/9/24 0509)     And   LIDOcaine viscous HCl 2% oral solution 15 mL (15 mLs Oral Given 6/9/24 0509)   nitroGLYCERIN SL tablet 0.4 mg (0.4 mg Sublingual Given 6/9/24 0510)   ondansetron injection 4 mg (4 mg Intravenous Given 6/9/24 0507)   acetaminophen tablet 650 mg (650 mg Oral Given 6/9/24 0539)   morphine injection 4 mg (4 mg Intravenous Given 6/9/24 0540)   methocarbamoL tablet 1,500 mg (1,500 mg Oral Given 6/9/24 7865)   methylPREDNISolone sodium succinate injection 125 mg (125 mg Intravenous Given 6/9/24 3274)     Medical Decision Making      53-year-old male presenting with chest pain.  Falling multiple doses of analgesia the patient continues to have chest pain.  Initial troponin negative.  The patient did have mild ST elevation but no reciprocal change.  Pending lipase, repeat troponin, and D-dimer.    The patient also reports a generalized headache.  No extremity weakness or sensory deficits noted.  CT head shows no ICH or mass.  Headache did improve there are ER stay.    Differential diagnosis includes ACS, gastritis, pancreatitis, reflux, pulmonary embolus, URI, lower respiratory tract infection, CVA, multiple sclerosis flare.        Amount and/or Complexity of Data Reviewed  Labs: ordered.  Radiology: ordered.  ECG/medicine tests:  Decision-making details documented in ED Course.    Risk  OTC drugs.  Prescription drug management.      Additional MDM:   Heart Score:    History:          Moderately suspicious.  ECG:             Nonspecific repolarisation disturbance  Age:               45-65 years  Risk factors: no risk factors known  Troponin:       Less than or equal to normal limit  Heart Score = 3                ED Course as of 06/10/24 0714   Sun Jun 09, 2024   0506 EKG 12-lead  Time 4:50 a.m.     Rate 88, sinus, regular rhythm, normal axis.   QRS 88 .  1 mm ST elevation in V1, V2.  No reciprocal ST depression.  No hyperacute T-wave.   No Q-waves present.  P-wave inversions lead at 3.    Normal sinus rhythm with nonspecific ST elevation. [JM]   0512 Second EKG time 4:58 a.m.    Rate 92, sinus, regular rhythm, normal axis.   QRS 80 .  1 mm FATIMAH V1, V2.  No ST depression.  No hyperacute T-waves.  No Q-waves present P wave inversion lead at 3.    A normal sinus rhythm with nonspecific ST change. [JM]   0709 EKG 12-lead  Time 5:42 a.m.     Rate 83, sinus, regular rhythm, normal axis.   QRS 82 .  1 mm ST-elevation V2.  No ST depression.  T-wave inversion aVL.  No hyperacute T-waves.  No Q-waves present     Normal sinus rhythm nonspecific T-wave inversion. [JM]   1004 This is Jennifer Manuel PA-C dictating.  I assumed care of this patient from Dr. Nielsen at shift change pending 2nd troponin, D-dimer, lipase, chest x-ray results and patient re-evaluation.  Second troponin resulted normal, along with D-dimer and lipase.  Chest x-ray negative for any acute abnormality.  I re-evaluated the patient, he was still complaining of chest tightness and intermittent, spasm type chest pain.  He was treated with Robaxin with only mild improvement.  I reviewed all this test results with him, stating that his labs look good and I believe he will be stable for discharge, however patient states he was still feeling tightness in pain in his chest.  Reports sometimes he will have flare-ups of his MS that attack different areas in his body, never the same.  Last time he was treated with a steroid infusion when he had a flare-up.  I reviewed the case with my attending, Dr. Ramos, who recommended that we give the patient 1 dose of IV Solu-Medrol.  This is ordered and patient re-evaluated 1 hour later, stating significant improvement of pain.  Pain went from an 8/10 to a 5/10 after steroids.  At this point he does feel well enough to go home.  I counseled patient that he needs to have urgent follow up with his neurologist, call them tomorrow morning  1st thing to discuss his symptoms and return to the ED if any symptoms worsen before then.  Patient is agreeable, Dr. Ramos is agreeable.  Patient will be discharged at this time. [MB]   1007 FYI: Patient with a low risk HEART score of 3 points. Chest pain is reproducible with deep breath and chest wall palpation. [MB]      ED Course User Index  [JM] Jonathon Nielsen MD  [MB] Jennifer Manuel PA-C                             Clinical Impression:  Final diagnoses:  [R07.9] Chest pain  [R51.9] Acute nonintractable headache, unspecified headache type  [R07.89] Atypical chest pain (Primary)          ED Disposition Condition    Discharge Stable          ED Prescriptions       Medication Sig Dispense Start Date End Date Auth. Provider    methocarbamoL (ROBAXIN) 750 MG Tab Take 2 tablets (1,500 mg total) by mouth 3 (three) times daily. for 3 days 18 tablet 6/9/2024 6/12/2024 Jennifer Manuel PA-C          Follow-up Information       Follow up With Specialties Details Why Contact Info    Carmenza Junior MD Neurology Call in 1 day For follow up [address] [facility] [address]  [phone]      [facility] Emergency Dept Emergency Medicine Go to  As needed, If symptoms worsen [address] [facility] [place] [address] [phone]             Jonathon Nielsen MD  06/09/24 0807       Jonathon Nielsen MD  06/09/24 0828       Jonathon Nielsen MD  06/10/24 0714

## 2024-06-09 NOTE — ED TRIAGE NOTES
Pt arrives with midsternal chest pain that radiates down left arm; described as sharp and tingling. Also c/o nausea. Pt reports the pain began when he got up to use the restroom this morning. Hx of MSStephani Denies shortness of breath at this time.

## 2024-06-09 NOTE — DISCHARGE INSTRUCTIONS
Please take the prescribed medications according to the directions on the bottle in addition to your prescribed meloxicam.  You may use a heating pad for additional pain relief for 15 minutes at a time.  Please call your neurologist 1st thing tomorrow morning to discuss your symptoms.  If your symptoms worsen please return to the ED for reevaluation.

## 2024-06-10 ENCOUNTER — PATIENT MESSAGE (OUTPATIENT)
Dept: NEUROLOGY | Facility: CLINIC | Age: 54
End: 2024-06-10
Payer: COMMERCIAL

## 2024-06-10 LAB
OHS QRS DURATION: 84 MS
OHS QRS DURATION: 88 MS
OHS QTC CALCULATION: 457 MS
OHS QTC CALCULATION: 467 MS

## 2024-06-12 ENCOUNTER — TELEPHONE (OUTPATIENT)
Dept: PSYCHIATRY | Facility: CLINIC | Age: 54
End: 2024-06-12
Payer: COMMERCIAL

## 2024-06-12 ENCOUNTER — OFFICE VISIT (OUTPATIENT)
Dept: NEUROLOGY | Facility: CLINIC | Age: 54
End: 2024-06-12
Payer: COMMERCIAL

## 2024-06-12 ENCOUNTER — LAB VISIT (OUTPATIENT)
Dept: LAB | Facility: HOSPITAL | Age: 54
End: 2024-06-12
Attending: PSYCHIATRY & NEUROLOGY
Payer: COMMERCIAL

## 2024-06-12 ENCOUNTER — TELEPHONE (OUTPATIENT)
Dept: NEUROLOGY | Facility: CLINIC | Age: 54
End: 2024-06-12

## 2024-06-12 VITALS
HEART RATE: 73 BPM | SYSTOLIC BLOOD PRESSURE: 137 MMHG | DIASTOLIC BLOOD PRESSURE: 78 MMHG | WEIGHT: 170.88 LBS | BODY MASS INDEX: 25.31 KG/M2 | HEIGHT: 69 IN

## 2024-06-12 DIAGNOSIS — G35 MS (MULTIPLE SCLEROSIS): Primary | ICD-10-CM

## 2024-06-12 DIAGNOSIS — G35 MULTIPLE SCLEROSIS EXACERBATION: ICD-10-CM

## 2024-06-12 DIAGNOSIS — R07.9 CHEST PAIN, UNSPECIFIED TYPE: ICD-10-CM

## 2024-06-12 DIAGNOSIS — G35 MS (MULTIPLE SCLEROSIS): ICD-10-CM

## 2024-06-12 PROCEDURE — 99999 PR PBB SHADOW E&M-EST. PATIENT-LVL V: CPT | Mod: PBBFAC,,, | Performed by: PSYCHIATRY & NEUROLOGY

## 2024-06-12 PROCEDURE — G2211 COMPLEX E/M VISIT ADD ON: HCPCS | Mod: S$GLB,,, | Performed by: PSYCHIATRY & NEUROLOGY

## 2024-06-12 PROCEDURE — 1159F MED LIST DOCD IN RCRD: CPT | Mod: CPTII,S$GLB,, | Performed by: PSYCHIATRY & NEUROLOGY

## 2024-06-12 PROCEDURE — 3078F DIAST BP <80 MM HG: CPT | Mod: CPTII,S$GLB,, | Performed by: PSYCHIATRY & NEUROLOGY

## 2024-06-12 PROCEDURE — 88185 FLOWCYTOMETRY/TC ADD-ON: CPT | Mod: 59 | Performed by: PSYCHIATRY & NEUROLOGY

## 2024-06-12 PROCEDURE — 1160F RVW MEDS BY RX/DR IN RCRD: CPT | Mod: CPTII,S$GLB,, | Performed by: PSYCHIATRY & NEUROLOGY

## 2024-06-12 PROCEDURE — 3075F SYST BP GE 130 - 139MM HG: CPT | Mod: CPTII,S$GLB,, | Performed by: PSYCHIATRY & NEUROLOGY

## 2024-06-12 PROCEDURE — 99215 OFFICE O/P EST HI 40 MIN: CPT | Mod: S$GLB,,, | Performed by: PSYCHIATRY & NEUROLOGY

## 2024-06-12 PROCEDURE — 3008F BODY MASS INDEX DOCD: CPT | Mod: CPTII,S$GLB,, | Performed by: PSYCHIATRY & NEUROLOGY

## 2024-06-12 PROCEDURE — 36415 COLL VENOUS BLD VENIPUNCTURE: CPT | Performed by: PSYCHIATRY & NEUROLOGY

## 2024-06-12 RX ORDER — MELOXICAM 15 MG/1
15 TABLET ORAL
COMMUNITY
Start: 2024-05-02

## 2024-06-12 NOTE — PROGRESS NOTES
Subjective:          Patient ID: Helio Nolasco is a 53 y.o. male who presents today for a fit-in clinic visit for MS.  He comes in with his wife    MS HPI:  DMT: ocrelizumab -due next Friday   On Sunday AM, awoke with a tight squeezing pain in left chest which radiated down the left arm.  Went to [facility].  EKG was okay; treated with ASA, NTG.  NTG did not make pain better.    The pain continued - lots of pressure on his chest.  Felt that something was sitting on his chest.  Did have SOB.  No nausea.  Did develop numbness in the all 4s - hands and fingers.    Gave a muscle relaxer which brought pain down from level 10 to 8.    ER decided to treat with IV steroids and he was treated with methylprednisolone 125mg and pain did improved not long after that.     Did well until yesterday and then had tightness again yesterday and took muscle relaxer;   Pt does not have HTN, no tobacco in 5 years (cigars), no DM; does have high cholesterol  Does not take an ASA normally;   He's never had chest pain or SOB on exertion before.     Medications:  Current Outpatient Medications   Medication Sig    calcium carbonate-vit D3-min 600 mg calcium- 400 unit Tab Take 1 tablet by mouth once daily.    cetirizine (ZYRTEC) 10 MG tablet Take 10 mg by mouth as needed.     cholecalciferol, vitamin D3, (VITAMIN D3) 50 mcg (2,000 unit) Cap capsule 1 capsule.    cyanocobalamin 2000 MCG tablet Take 1,000 mcg by mouth once daily.     escitalopram (LEXAPRO) 10 MG tablet Take 10 mg by mouth every evening.     FOLIC ACID/MV,FE,OTHER MIN (CENTRUM ORAL) Take by mouth once daily.     glucosamine/chondroitin/C/Chip (GLUCOSAMINE 1500 COMPLEX ORAL) Take by mouth.    latanoprost 0.005 % ophthalmic solution Place 1 drop into both eyes every evening.    meloxicam (MOBIC) 15 MG tablet Take 15 mg by mouth.    ocrelizumab (OCREVUS) 30 mg/mL Soln Infuse Ocrevus 600mg in 500mL of 0.9% NaCl IV every 24 weeks. Pre-meds: Solumedrol 100mg IVPB, Benadryl  50mg IVP, Tylenol 1000mg PO, Pepcid 20mg IVP. Pt is due for his next infusion on 11/26/22.    omega-3/dha/epa/fish oil (OMEGA-3 ORAL) Take 500 mg by mouth once daily.     No current facility-administered medications for this visit.       SOCIAL HISTORY  Social History     Tobacco Use    Smoking status: Former     Types: Cigars    Smokeless tobacco: Never    Tobacco comments:     Pt stated he quit last yr 2020   Substance Use Topics    Alcohol use: Yes     Alcohol/week: 0.0 standard drinks of alcohol     Comment: socially    Drug use: No               6/11/2024     9:44 AM   REVIEW OF SYMPTOMS   Do you feel abnormally tired on most days? No   Do you feel you generally sleep well? Yes   Do you have difficulty controlling your bladder?  No   Do you have difficulty controlling your bowels?  No   Do you have frequent muscle cramps, tightness or spasms in your limbs?  No   Do you have new visual symptoms?  No   Do you have worsening difficulty with your memory or thinking? No   Do you have worsening symptoms of anxiety or depression?  No   For patients who walk, Do you have more difficulty walking?  No   Have you fallen since your last visit?  No   For patients who use wheelchairs: Do you have any skin wounds or breakdown? Not Applicable   Do you have difficulty using your hands?  No   Do you have shooting or burning pain? No   Do you have difficulty with sexual function?  No   If you are sexually active, are you using birth control? Y/N  N/A Not Applicable   Do you often choke when swallowing liquids or solid food?  No   Do you experience worsening symptoms when overheated? Yes   Do you need any new equipment such as a wheelchair, walker or shower chair? No   Do you receive co-pay financial assistance for your principal MS medicine? Yes   Would you be interested in participating in an MS research trial in the future? No   For patients on Gilenya, Tecfidera, Aubagio, Rituxan, Ocrevus, Tysabri, Lemtrada or Methotrexate,  are you aware that you should NOT receive live virus vaccines?  Yes   Do you feel you have adequate family/friend support?  Yes   Do you have health insurance?   Yes   Are you currently employed? Yes   Do you receive SSDI/SSI?  Not Applicable   Do you use marijuana or cannabis products? No   Have you been diagnosed with a urinary tract infection since your last visit here? No   Have you been diagnosed with a respiratory tract infection since your last visit here? No   Have you been to the emergency room since your last visit here? Yes   Have you been hospitalized since your last visit here?  No                Objective:            11/28/2023    12:01 AM 6/12/2024    12:01 AM   Timed 25 Foot Walk:   Did patient wear an AFO? No No   Was assistive device used? No No   Time for 25 Foot Walk (seconds) 3.8 3.3       Neurologic Exam  MENTAL STATUS:  Grossly intact.                                                                                                                             CRANIAL NERVE EXAM:  His extraocular muscles are intact.  Pupils are equal,  round and reactive to light.  No facial asymmetry.                                                                                                          MOTOR EXAM:  Shows some increased tone in the lower extremities and slow      rapid sequential movements in the right hand.                                                                                                               REFLEXES:  3+ and symmetric throughout.  Toes are silent.                                                                                                   SENSORY EXAM:  Shows no evidence of vibration loss.                                                                                                         COORDINATION:  Shows mild dystaxia on finger-to-nose.                                                                                                       GAIT:  Slightly  wide based, but steady.        Imaging:     Results for orders placed during the hospital encounter of 11/03/22    MRI Brain Demyelinating Without Contrast    Impression  Brain appears stable from prior exam, again demonstrating findings compatible with the reported history of multiple sclerosis.  No new discrete lesions to indicate ongoing demyelination.      Electronically signed by: Logan Hutson MD  Date:    11/03/2022  Time:    10:17    Results for orders placed during the hospital encounter of 04/12/21    MRI Cervical Spine Demyelinating Without Contrast    Impression  MRI brain: No significant change from prior with continued several scattered punctate foci of T2 FLAIR signal hyperintensity throughout the brain parenchyma while nonspecific remain concerning for mild degree of prior demyelinating plaque burden.    No definite new lesion or diffusion signal abnormality to suggest significant interval or active demyelination.    Clinical correlation and follow-up advised.    MRI cervical spine: Continued few scattered foci of T2/stir signal hyperintensity within the cervical cord in light of history and intracranial findings concerning for prior demyelination.  No definite new cord signal abnormality to suggest interval or active cervical cord demyelination in light of history    Multilevel degenerative changes cervical spine slightly progressed from prior.    Please see above for additional details.      Electronically signed by: Jose A Carter DO  Date:    04/12/2021  Time:    11:55    No results found for this or any previous visit.    Results for orders placed during the hospital encounter of 03/13/20    MRI Brain Demyelinating W W/O Contrast    Impression  Stable, scattered T2/FLAIR hyperintense lesions throughout the supratentorial white matter, medulla, and upper cervical cord in keeping with patient's reported history of multiple sclerosis.  No evidence of new or ongoing demyelination.    Electronically  "signed by resident: Eric Hackett  Date:    03/13/2020  Time:    13:14    Electronically signed by: Thiago Larose MD  Date:    03/13/2020  Time:    14:46    No results found for this or any previous visit.    No results found for this or any previous visit.        Labs:     Lab Results   Component Value Date    ZYBFAJSN95XW 50 11/28/2023    PTMLYHGT57LT 47 03/31/2021    VDFIXGAO89NU 51 09/03/2020     No results found for: "JCVINDEX", "JCVANTIBODY"  Lab Results   Component Value Date    SJ9WJNMJ 55.1 08/12/2019    ABSOLUTECD3 546 (L) 08/12/2019    YN8YMTLP 15.4 08/12/2019    ABSOLUTECD8 153 (L) 08/12/2019    AH2CAVMI 39.2 08/12/2019    ABSOLUTECD4 389 08/12/2019    LABCD48 2.54 08/12/2019     Lab Results   Component Value Date    WBC 4.53 06/09/2024    RBC 5.21 06/09/2024    HGB 15.0 06/09/2024    HCT 46.1 06/09/2024    MCV 89 06/09/2024    MCH 28.8 06/09/2024    MCHC 32.5 06/09/2024    RDW 11.9 06/09/2024     06/09/2024    MPV 9.1 (L) 06/09/2024    GRAN 1.6 (L) 06/09/2024    GRAN 36.0 (L) 06/09/2024    LYMPH 2.2 06/09/2024    LYMPH 47.7 06/09/2024    MONO 0.4 06/09/2024    MONO 9.7 06/09/2024    EOS 0.2 06/09/2024    BASO 0.06 06/09/2024    EOSINOPHIL 5.1 06/09/2024    BASOPHIL 1.3 06/09/2024     Sodium   Date Value Ref Range Status   06/09/2024 144 136 - 145 mmol/L Final     Potassium   Date Value Ref Range Status   06/09/2024 3.9 3.5 - 5.1 mmol/L Final     Chloride   Date Value Ref Range Status   06/09/2024 110 95 - 110 mmol/L Final     CO2   Date Value Ref Range Status   06/09/2024 20 (L) 23 - 29 mmol/L Final     Glucose   Date Value Ref Range Status   06/09/2024 118 (H) 70 - 110 mg/dL Final     BUN   Date Value Ref Range Status   06/09/2024 14 6 - 20 mg/dL Final     Creatinine   Date Value Ref Range Status   06/09/2024 0.9 0.5 - 1.4 mg/dL Final     Calcium   Date Value Ref Range Status   06/09/2024 9.2 8.7 - 10.5 mg/dL Final     Total Protein   Date Value Ref Range Status   06/09/2024 7.1 6.0 - 8.4 g/dL " Final     Albumin   Date Value Ref Range Status   06/09/2024 3.9 3.5 - 5.2 g/dL Final     Total Bilirubin   Date Value Ref Range Status   06/09/2024 0.3 0.1 - 1.0 mg/dL Final     Comment:     For infants and newborns, interpretation of results should be based  on gestational age, weight and in agreement with clinical  observations.    Premature Infant recommended reference ranges:  Up to 24 hours.............<8.0 mg/dL  Up to 48 hours............<12.0 mg/dL  3-5 days..................<15.0 mg/dL  6-29 days.................<15.0 mg/dL       Alkaline Phosphatase   Date Value Ref Range Status   06/09/2024 59 55 - 135 U/L Final     AST   Date Value Ref Range Status   06/09/2024 32 10 - 40 U/L Final     ALT   Date Value Ref Range Status   06/09/2024 39 10 - 44 U/L Final     Anion Gap   Date Value Ref Range Status   06/09/2024 14 8 - 16 mmol/L Final     eGFR if    Date Value Ref Range Status   08/12/2019 >60 >60 mL/min/1.73 m^2 Final     eGFR if non    Date Value Ref Range Status   08/12/2019 >60 >60 mL/min/1.73 m^2 Final     Comment:     Calculation used to obtain the estimated glomerular filtration  rate (eGFR) is the CKD-EPI equation.        Lab Results   Component Value Date    HEPBSAG Non-reactive 05/18/2024    HEPBSAB <3.00 04/15/2023    HEPBSAB Non-reactive 04/15/2023    HEPBCAB Non-reactive 05/18/2024           MS Impression and Plan:     NEURO MULTIPLE SCLEROSIS IMPRESSION:   Number of relapses in the past year?:  1  Clinical Progression:  Worsened  Type:  New Relapse  MRI Progression:  N/A  MS Classification:  Relapsing-Remitting MS  DMT:  No change in management  Symptom Management:  No change in symptom management   Given lack of response of chest tightness to NTG, suspect this was in fact a MS relapse manifesting as MS hug. Will order STAT MRIs of T and C spine to explore further with contrast.   Would like cardiology evaluation and advised him to continue baby ASA until he  sees cardiology. Check B cells today   F/u 3 weeks Kendy Clark CNS          Problem List Items Addressed This Visit          Unprioritized    MS (multiple sclerosis) - Primary    Relevant Orders    MRI Cervical Spine Demyelinating W W/O Contrast    MRI Thoracic Spine Demyelinating W W/O Contrast    Rituxan Sensitivity (Completed)     Other Visit Diagnoses       Multiple sclerosis exacerbation        Relevant Orders    MRI Cervical Spine Demyelinating W W/O Contrast    MRI Thoracic Spine Demyelinating W W/O Contrast    Rituxan Sensitivity (Completed)    Chest pain, unspecified type        Relevant Orders    Ambulatory referral/consult to Cardiology            Carmenza Junior MD    I spent a total of 45 minutes on the day of the visit.This includes face to face time and non-face to face time preparing to see the patient (eg, review of tests), obtaining and/or reviewing separately obtained history, documenting clinical information in the electronic or other health record, independently interpreting results and communicating results to the patient/family/caregiver, or care coordinator.  Visit today included increased complexity associated with the care of the episodic problem :MS stan addressed and managing the longitudinal care of the patient due to the serious and/or complex managed problem(s) MS.

## 2024-06-12 NOTE — TELEPHONE ENCOUNTER
----- Message from Carmenza Junior MD sent at 6/12/2024  1:39 PM CDT -----  Pt out this whole week with MS exacerbation.  HE contacted us Monday AM and I'm seeing him urgently now, and I'm recommending he stay out of work for the rest of the week.  Please write a note attesting to this to give to his employer.

## 2024-06-12 NOTE — Clinical Note
Pt out this whole week with MS exacerbation.  HE contacted us Monday AM and I'm seeing him urgently now, and I'm recommending he stay out of work for the rest of the week.  Please write a note attesting to this to give to his employer.

## 2024-06-12 NOTE — LETTER
[facility] [address]  [address]  [facility] [address]  Phone: [phone]             2024      RE: Helio Nolasco (: 1970)      To Whom It May Concern:    Helio Nolasco is under my care for treatment of a chronic neurological condition and was seen by me in office on 2024 experiencing symptom exacerbations of muscle pain and numbness. I am recommending he remain home from work for the rest of the week and return on Monday, 2024. I can be reached at [phone] with any questions or concerns.    Sincerely,        Carmenza Junior MD, MPH                                      BB: jc

## 2024-06-12 NOTE — Clinical Note
Hey - Any chance you (or someone you recommend) could fit this patient in?  Suspect it was MS stan (squeezing thoracic pressure), but would feel better if cardiology evaluated him. Thanks, B

## 2024-06-12 NOTE — TELEPHONE ENCOUNTER
Spoke to pt. I let him know that I can schedule him for his t-spine MRI on Saturday. I let pt know if he does not want to make 2 trips [facility], I can schedule him for back-to-back MRIs on Saturday. Pt agreed to keep c-spine MRI appt on 6/13. I also scheduled his t-spine MRI on 6/15 at 2:00 PM at the [facility].

## 2024-06-13 ENCOUNTER — HOSPITAL ENCOUNTER (OUTPATIENT)
Dept: RADIOLOGY | Facility: HOSPITAL | Age: 54
Discharge: HOME | End: 2024-06-13
Attending: PSYCHIATRY & NEUROLOGY
Payer: COMMERCIAL

## 2024-06-13 DIAGNOSIS — G35 MULTIPLE SCLEROSIS EXACERBATION: ICD-10-CM

## 2024-06-13 DIAGNOSIS — G35 MS (MULTIPLE SCLEROSIS): ICD-10-CM

## 2024-06-13 LAB
PATH REPORT.FINAL DX SPEC: NORMAL
RITUXAN SENSITIVITY (CD20): NORMAL

## 2024-06-13 PROCEDURE — A9585 GADOBUTROL INJECTION: HCPCS | Performed by: PSYCHIATRY & NEUROLOGY

## 2024-06-13 PROCEDURE — 25500020 PHARM REV CODE 255: Performed by: PSYCHIATRY & NEUROLOGY

## 2024-06-13 PROCEDURE — 72156 MRI NECK SPINE W/O & W/DYE: CPT | Mod: TC

## 2024-06-13 PROCEDURE — 72156 MRI NECK SPINE W/O & W/DYE: CPT | Mod: 26,,, | Performed by: RADIOLOGY

## 2024-06-13 RX ORDER — GADOBUTROL 604.72 MG/ML
8 INJECTION INTRAVENOUS
Status: COMPLETED | OUTPATIENT
Start: 2024-06-13 | End: 2024-06-13

## 2024-06-13 RX ADMIN — GADOBUTROL 8 ML: 604.72 INJECTION INTRAVENOUS at 05:06

## 2024-06-15 ENCOUNTER — HOSPITAL ENCOUNTER (OUTPATIENT)
Dept: RADIOLOGY | Facility: HOSPITAL | Age: 54
Discharge: HOME | End: 2024-06-15
Attending: PSYCHIATRY & NEUROLOGY
Payer: COMMERCIAL

## 2024-06-15 DIAGNOSIS — G35 MULTIPLE SCLEROSIS EXACERBATION: ICD-10-CM

## 2024-06-15 DIAGNOSIS — G35 MS (MULTIPLE SCLEROSIS): ICD-10-CM

## 2024-06-15 PROCEDURE — 72157 MRI CHEST SPINE W/O & W/DYE: CPT | Mod: TC

## 2024-06-15 PROCEDURE — A9585 GADOBUTROL INJECTION: HCPCS | Performed by: PSYCHIATRY & NEUROLOGY

## 2024-06-15 PROCEDURE — 25500020 PHARM REV CODE 255: Performed by: PSYCHIATRY & NEUROLOGY

## 2024-06-15 PROCEDURE — 72157 MRI CHEST SPINE W/O & W/DYE: CPT | Mod: 26,,, | Performed by: RADIOLOGY

## 2024-06-15 RX ORDER — GADOBUTROL 604.72 MG/ML
8 INJECTION INTRAVENOUS
Status: COMPLETED | OUTPATIENT
Start: 2024-06-15 | End: 2024-06-15

## 2024-06-15 RX ADMIN — GADOBUTROL 8 ML: 604.72 INJECTION INTRAVENOUS at 03:06

## 2024-06-20 NOTE — PROGRESS NOTES
Subjective:          Patient ID: Helio Nolasco is a 53 y.o. male who presents today for a routine clinic visit for MS.  He was last seen 2 weeks ago by Dr. Junior. The history has been provided by the patient.       MS HPI:  DMT: Ocrevus--given on 6/17; due next in December   Side effects from DMT? No  Taking vitamin D3 as recommended? Yes   MRIs were stable after last visit.   He is scheduled with cardiology on 7/10.   The discomfort in his chest that he initially reported at the last visit is no longer present and resolved after the initial episode. He recalls now that the day before the pain started, he had been moving a couch when it is was hot outside.  He does not recall pulling a muscle, but wonders if the chest pain was just an effect of overexertion and the heat. When he was in the ER, he reports having tingling in his fingers and toes. His pain was 10/10 despite aspirin, Tylenol, and then morphine. IV steroids were the thing that finally made the pain more manageable.   He does have a prescription for muscle relaxers at home, given to him in the ER, that he has only taken once since the initial episode.   He denies any new symptoms in the past two weeks.   Her PCP gave him a prescription for gabapentin to have just in case he develops pain again.     Medications:  Current Outpatient Medications   Medication Sig    calcium carbonate-vit D3-min 600 mg calcium- 400 unit Tab Take 1 tablet by mouth once daily.    cetirizine (ZYRTEC) 10 MG tablet Take 10 mg by mouth as needed.     cholecalciferol, vitamin D3, (VITAMIN D3) 50 mcg (2,000 unit) Cap capsule 1 capsule.    cyanocobalamin 2000 MCG tablet Take 1,000 mcg by mouth once daily.     escitalopram (LEXAPRO) 10 MG tablet Take 10 mg by mouth every evening.     FOLIC ACID/MV,FE,OTHER MIN (CENTRUM ORAL) Take by mouth once daily.     glucosamine/chondroitin/C/Chip (GLUCOSAMINE 1500 COMPLEX ORAL) Take by mouth.    latanoprost 0.005 % ophthalmic solution  Place 1 drop into both eyes every evening.    meloxicam (MOBIC) 15 MG tablet Take 15 mg by mouth.    ocrelizumab (OCREVUS) 30 mg/mL Soln Infuse Ocrevus 600mg in 500mL of 0.9% NaCl IV every 24 weeks. Pre-meds: Solumedrol 100mg IVPB, Benadryl 50mg IVP, Tylenol 1000mg PO, Pepcid 20mg IVP. Pt is due for his next infusion on 11/26/22.    omega-3/dha/epa/fish oil (OMEGA-3 ORAL) Take 500 mg by mouth once daily.     No current facility-administered medications for this visit.       SOCIAL HISTORY  Social History     Tobacco Use    Smoking status: Former     Types: Cigars    Smokeless tobacco: Never    Tobacco comments:     Pt stated he quit last yr 2020   Substance Use Topics    Alcohol use: Yes     Alcohol/week: 0.0 standard drinks of alcohol     Comment: socially    Drug use: No       Living arrangements - the patient lives with his family     ROS:      6/18/2024     5:26 PM   REVIEW OF SYMPTOMS   Do you feel abnormally tired on most days? No   Do you feel you generally sleep well? Yes   Do you have difficulty controlling your bladder?  No   Do you have difficulty controlling your bowels?  No   Do you have frequent muscle cramps, tightness or spasms in your limbs?  No   Do you have new visual symptoms?  No   Do you have worsening difficulty with your memory or thinking? No   Do you have worsening symptoms of anxiety or depression?  No   For patients who walk, Do you have more difficulty walking?  No   Have you fallen since your last visit?  No   For patients who use wheelchairs: Do you have any skin wounds or breakdown? Not Applicable   Do you have difficulty using your hands?  No   Do you have shooting or burning pain? No   Do you have difficulty with sexual function?  No   If you are sexually active, are you using birth control? Y/N  N/A Not Applicable   Do you often choke when swallowing liquids or solid food?  No   Do you experience worsening symptoms when overheated? Yes   Do you need any new equipment such  as a wheelchair, walker or shower chair? No   Do you receive co-pay financial assistance for your principal MS medicine? Yes   Would you be interested in participating in an MS research trial in the future? No   For patients on Gilenya, Tecfidera, Aubagio, Rituxan, Ocrevus, Tysabri, Lemtrada or Methotrexate, are you aware that you should NOT receive live virus vaccines?  Yes   Do you feel you have adequate family/friend support?  Yes   Do you have health insurance?   Yes   Are you currently employed? Yes   Do you receive SSDI/SSI?  Not Applicable   Do you use marijuana or cannabis products? No   Have you been diagnosed with a urinary tract infection since your last visit here? No   Have you been diagnosed with a respiratory tract infection since your last visit here? No   Have you been to the emergency room since your last visit here? No   Have you been hospitalized since your last visit here?  No              Objective:        1. 25 foot timed walk:      11/28/2023    12:01 AM 6/12/2024    12:01 AM   Timed 25 Foot Walk:   Did patient wear an AFO? No No   Was assistive device used? No No   Time for 25 Foot Walk (seconds) 3.8 3.3       Neurologic Exam    Deferred   Imaging:     Results for orders placed during the hospital encounter of 11/03/22    MRI Brain Demyelinating Without Contrast    Impression  Brain appears stable from prior exam, again demonstrating findings compatible with the reported history of multiple sclerosis.  No new discrete lesions to indicate ongoing demyelination.      Electronically signed by: Logan Hutson MD  Date:    11/03/2022  Time:    10:17      Results for orders placed during the hospital encounter of 06/13/24    MRI Cervical Spine Demyelinating W W/O Contrast    Impression  1. Small foci of increased T2/STIR imaging within the cervical cord, similar to the prior study consistent with history of multiple sclerosis.  See above comments.  2. Multilevel mild degenerative changes.  3. No  acute abnormality.      Electronically signed by: Zain Enrique  Date:    06/13/2024  Time:    18:22    Results for orders placed during the hospital encounter of 06/15/24    MRI Thoracic Spine Demyelinating W W/O Contrast    Impression  Lesions are suspected within the thoracic cord at T2 and T3, similar to the prior study with no new lesion or abnormal enhancement identified.      Electronically signed by: Kiko Velazquez  Date:    06/15/2024  Time:    15:27      Images were reviewed with the patient.   Labs:     Lab Results   Component Value Date    KNAMAMYX91WQ 50 11/28/2023    [id number] 47 03/31/2021    FXTBCFUC08LR 51 09/03/2020       Lab Results   Component Value Date    WR2OYGBZ 55.1 08/12/2019    ABSOLUTECD3 546 (L) 08/12/2019    EI6OBISO 15.4 08/12/2019    ABSOLUTECD8 153 (L) 08/12/2019    SD7HWTBP 39.2 08/12/2019    ABSOLUTECD4 389 08/12/2019    LABCD48 2.54 08/12/2019     Lab Results   Component Value Date    WBC 4.53 06/09/2024    RBC 5.21 06/09/2024    HGB 15.0 06/09/2024    HCT 46.1 06/09/2024    MCV 89 06/09/2024    MCH 28.8 06/09/2024    MCHC 32.5 06/09/2024    RDW 11.9 06/09/2024     06/09/2024    MPV 9.1 (L) 06/09/2024    GRAN 1.6 (L) 06/09/2024    GRAN 36.0 (L) 06/09/2024    LYMPH 2.2 06/09/2024    LYMPH 47.7 06/09/2024    MONO 0.4 06/09/2024    MONO 9.7 06/09/2024    EOS 0.2 06/09/2024    BASO 0.06 06/09/2024    EOSINOPHIL 5.1 06/09/2024    BASOPHIL 1.3 06/09/2024     Sodium   Date Value Ref Range Status   06/09/2024 144 136 - 145 mmol/L Final     Potassium   Date Value Ref Range Status   06/09/2024 3.9 3.5 - 5.1 mmol/L Final     Chloride   Date Value Ref Range Status   06/09/2024 110 95 - 110 mmol/L Final     CO2   Date Value Ref Range Status   06/09/2024 20 (L) 23 - 29 mmol/L Final     Glucose   Date Value Ref Range Status   06/09/2024 118 (H) 70 - 110 mg/dL Final     BUN   Date Value Ref Range Status   06/09/2024 14 6 - 20 mg/dL Final     Creatinine   Date Value Ref Range Status    06/09/2024 0.9 0.5 - 1.4 mg/dL Final     Calcium   Date Value Ref Range Status   06/09/2024 9.2 8.7 - 10.5 mg/dL Final     Total Protein   Date Value Ref Range Status   06/09/2024 7.1 6.0 - 8.4 g/dL Final     Albumin   Date Value Ref Range Status   06/09/2024 3.9 3.5 - 5.2 g/dL Final     Total Bilirubin   Date Value Ref Range Status   06/09/2024 0.3 0.1 - 1.0 mg/dL Final     Comment:     For infants and newborns, interpretation of results should be based  on gestational age, weight and in agreement with clinical  observations.    Premature Infant recommended reference ranges:  Up to 24 hours.............<8.0 mg/dL  Up to 48 hours............<12.0 mg/dL  3-5 days..................<15.0 mg/dL  6-29 days.................<15.0 mg/dL       Alkaline Phosphatase   Date Value Ref Range Status   06/09/2024 59 55 - 135 U/L Final     AST   Date Value Ref Range Status   06/09/2024 32 10 - 40 U/L Final     ALT   Date Value Ref Range Status   06/09/2024 39 10 - 44 U/L Final     Anion Gap   Date Value Ref Range Status   06/09/2024 14 8 - 16 mmol/L Final     eGFR if    Date Value Ref Range Status   08/12/2019 >60 >60 mL/min/1.73 m^2 Final     eGFR if non    Date Value Ref Range Status   08/12/2019 >60 >60 mL/min/1.73 m^2 Final     Comment:     Calculation used to obtain the estimated glomerular filtration  rate (eGFR) is the CKD-EPI equation.        Lab Results   Component Value Date    HEPBSAG Non-reactive 05/18/2024    HEPBSAB <3.00 04/15/2023    HEPBSAB Non-reactive 04/15/2023    HEPBCAB Non-reactive 05/18/2024           MS Impression and Plan:     NEURO MULTIPLE SCLEROSIS IMPRESSION:   DMT:  No change in management  DMT comment:  Next Ocrevus infusion is due in December. We will check labs at his next appt in November.   1.) MS Roslyn--Discomfort has not recurred. He has gabapentin at home to take if needed, as well as meloxicam. I don't see a muscle relaxer on his list. Since symptoms have  resolved, we can hold on prescribing one for now. We will continue to monitor for return of symptoms or new symptoms. He will keep cardiology appt in July.       Annual brain MRI is due in November. He will follow up with Dr. Junior in November.   The visit today is associated with current or anticipated ongoing medical care related to this patient's single serious condition/complex condition of multiple sclerosis.    Total time spent with patient: 18 minutes   Total time spent on encounter: 25 minutes         RASHEEDA Simmons, CNS

## 2024-06-25 ENCOUNTER — OFFICE VISIT (OUTPATIENT)
Dept: NEUROLOGY | Facility: CLINIC | Age: 54
End: 2024-06-25
Payer: COMMERCIAL

## 2024-06-25 VITALS
HEIGHT: 69 IN | DIASTOLIC BLOOD PRESSURE: 76 MMHG | HEART RATE: 76 BPM | WEIGHT: 176.69 LBS | BODY MASS INDEX: 26.17 KG/M2 | SYSTOLIC BLOOD PRESSURE: 119 MMHG

## 2024-06-25 DIAGNOSIS — Z71.89 COUNSELING REGARDING GOALS OF CARE: ICD-10-CM

## 2024-06-25 DIAGNOSIS — Z29.89 PROPHYLACTIC IMMUNOTHERAPY: ICD-10-CM

## 2024-06-25 DIAGNOSIS — G35 MULTIPLE SCLEROSIS: Primary | ICD-10-CM

## 2024-06-25 DIAGNOSIS — Z79.899 HIGH RISK MEDICATION USE: ICD-10-CM

## 2024-06-25 PROCEDURE — 3008F BODY MASS INDEX DOCD: CPT | Mod: CPTII,S$GLB,, | Performed by: CLINICAL NURSE SPECIALIST

## 2024-06-25 PROCEDURE — 99999 PR PBB SHADOW E&M-EST. PATIENT-LVL III: CPT | Mod: PBBFAC,,, | Performed by: CLINICAL NURSE SPECIALIST

## 2024-06-25 PROCEDURE — 3074F SYST BP LT 130 MM HG: CPT | Mod: CPTII,S$GLB,, | Performed by: CLINICAL NURSE SPECIALIST

## 2024-06-25 PROCEDURE — 3078F DIAST BP <80 MM HG: CPT | Mod: CPTII,S$GLB,, | Performed by: CLINICAL NURSE SPECIALIST

## 2024-06-25 PROCEDURE — 99213 OFFICE O/P EST LOW 20 MIN: CPT | Mod: S$GLB,,, | Performed by: CLINICAL NURSE SPECIALIST

## 2024-06-25 PROCEDURE — 1159F MED LIST DOCD IN RCRD: CPT | Mod: CPTII,S$GLB,, | Performed by: CLINICAL NURSE SPECIALIST

## 2024-06-25 RX ORDER — GABAPENTIN 100 MG/1
100 CAPSULE ORAL 3 TIMES DAILY
COMMUNITY
Start: 2024-06-24

## 2024-07-10 ENCOUNTER — OFFICE VISIT (OUTPATIENT)
Dept: CARDIOLOGY | Facility: CLINIC | Age: 54
End: 2024-07-10
Payer: COMMERCIAL

## 2024-07-10 VITALS
DIASTOLIC BLOOD PRESSURE: 84 MMHG | WEIGHT: 171.94 LBS | HEART RATE: 64 BPM | BODY MASS INDEX: 25.47 KG/M2 | RESPIRATION RATE: 18 BRPM | SYSTOLIC BLOOD PRESSURE: 126 MMHG | OXYGEN SATURATION: 99 % | HEIGHT: 69 IN

## 2024-07-10 DIAGNOSIS — R07.89 OTHER CHEST PAIN: ICD-10-CM

## 2024-07-10 PROCEDURE — 3079F DIAST BP 80-89 MM HG: CPT | Mod: CPTII,S$GLB,, | Performed by: INTERNAL MEDICINE

## 2024-07-10 PROCEDURE — 1159F MED LIST DOCD IN RCRD: CPT | Mod: CPTII,S$GLB,, | Performed by: INTERNAL MEDICINE

## 2024-07-10 PROCEDURE — 3074F SYST BP LT 130 MM HG: CPT | Mod: CPTII,S$GLB,, | Performed by: INTERNAL MEDICINE

## 2024-07-10 PROCEDURE — 99999 PR PBB SHADOW E&M-EST. PATIENT-LVL V: CPT | Mod: PBBFAC,,, | Performed by: INTERNAL MEDICINE

## 2024-07-10 PROCEDURE — 1160F RVW MEDS BY RX/DR IN RCRD: CPT | Mod: CPTII,S$GLB,, | Performed by: INTERNAL MEDICINE

## 2024-07-10 PROCEDURE — 3008F BODY MASS INDEX DOCD: CPT | Mod: CPTII,S$GLB,, | Performed by: INTERNAL MEDICINE

## 2024-07-10 PROCEDURE — 99203 OFFICE O/P NEW LOW 30 MIN: CPT | Mod: S$GLB,,, | Performed by: INTERNAL MEDICINE

## 2024-07-10 NOTE — PROGRESS NOTES
CARDIOLOGY CLINIC VISIT        HISTORY OF PRESENT ILLNESS:     07/10/2024: Helio Nolasco presents for evaluation of chest pain.  Seen 06/09/2024 in the emergency room.  Complaints of retrosternal chest tightness radiating to left arm and neck.  Blood pressure was 142/89.  EKG showed normal sinus rhythm.  Initially pain 10/10.  Nitroglycerin as well as GI cocktail with no improvement.  With prior MS relapses he states that he responded to steroids.  With muscle relaxants the pain decrease lately.  Eventually he received IV steroids which significantly reduced the discomfort.  Seen by his neurologist on 06/25/2024.  Follow-up MRIs of thoracic and cervical spine were performed.  No significant change from previous.  He was placed on a baby aspirin.    CARDIOVASCULAR HISTORY:     None    PAST MEDICAL HISTORY:     Past Medical History:   Diagnosis Date    Multiple sclerosis        PAST SURGICAL HISTORY:     Past Surgical History:   Procedure Laterality Date    HAND SURGERY      2 screws and a plate in hand    tissue surgery  10/2014    necrosis in left arm due rebif injections       ALLERGIES AND MEDICATION:   Review of patient's allergies indicates:  No Known Allergies     Medication List            Accurate as of July 10, 2024 11:39 AM. If you have any questions, ask your nurse or doctor.                CONTINUE taking these medications      calcium carbonate-vit D3-min 600 mg calcium- 400 unit Tab     CENTRUM ORAL     cetirizine 10 MG tablet  Commonly known as: ZYRTEC     cholecalciferol (vitamin D3) 50 mcg (2,000 unit) Cap capsule  Commonly known as: VITAMIN D3     cyanocobalamin 2000 MCG tablet     EScitalopram oxalate 10 MG tablet  Commonly known as: LEXAPRO     gabapentin 100 MG capsule  Commonly known as: NEURONTIN     GLUCOSAMINE 1500 COMPLEX ORAL     latanoprost 0.005 % ophthalmic solution     meloxicam 15 MG tablet  Commonly known as: MOBIC     OCREVUS 30 mg/mL Soln  Generic drug: ocrelizumab  Infuse Ocrevus 600mg  in 500mL of 0.9% NaCl IV every 24 weeks. Pre-meds: Solumedrol 100mg IVPB, Benadryl 50mg IVP, Tylenol 1000mg PO, Pepcid 20mg IVP. Pt is due for his next infusion on 11/26/22.     OMEGA-3 ORAL              SOCIAL HISTORY:     Social History     Socioeconomic History    Marital status:    Tobacco Use    Smoking status: Former     Types: Cigars    Smokeless tobacco: Never    Tobacco comments:     Pt stated he quit last yr 2020   Substance and Sexual Activity    Alcohol use: Yes     Alcohol/week: 0.0 standard drinks of alcohol     Comment: socially    Drug use: No    Sexual activity: Not Currently   Social History Narrative    Lives at home with his wife and son. Work full-time [employer] as a  6 days a week with 12 hour shifts.      Social Determinants of Health     Financial Resource Strain: Low Risk  (6/11/2024)    Overall Financial Resource Strain (CARDIA)     Difficulty of Paying Living Expenses: Not hard at all   Food Insecurity: No Food Insecurity (6/11/2024)    Hunger Vital Sign     Worried About Running Out of Food in the Last Year: Never true     Ran Out of Food in the Last Year: Never true   Physical Activity: Insufficiently Active (6/11/2024)    Exercise Vital Sign     Days of Exercise per Week: 5 days     Minutes of Exercise per Session: 20 min   Stress: No Stress Concern Present (6/11/2024)    Citizen of [place] [employer] Occupational [employer] - Occupational Stress Questionnaire     Feeling of Stress : Not at all   Housing Stability: Unknown (6/11/2024)    Housing Stability Vital Sign     Unable to Pay for Housing in the Last Year: No       FAMILY HISTORY:   No family history on file.    REVIEW OF SYSTEMS:   Review of Systems   Constitutional:  Negative for chills, diaphoresis, fever, malaise/fatigue and weight loss.   HENT:  Negative for congestion, hearing loss, sinus pain, sore throat and tinnitus.    Eyes:  Negative for blurred vision, double vision, photophobia and pain.  "  Respiratory:  Negative for cough, hemoptysis, sputum production, shortness of breath, wheezing and stridor.    Cardiovascular:  Positive for chest pain. Negative for palpitations, orthopnea, claudication, leg swelling and PND.   Gastrointestinal:  Negative for abdominal pain, blood in stool, heartburn, melena, nausea and vomiting.   Musculoskeletal:  Negative for back pain, falls, joint pain, myalgias and neck pain.   Neurological:  Negative for dizziness, tingling, tremors, sensory change, speech change, focal weakness, seizures, loss of consciousness, weakness and headaches.   Endo/Heme/Allergies:  Does not bruise/bleed easily.   Psychiatric/Behavioral:  Negative for depression, memory loss and substance abuse. The patient is not nervous/anxious.        PHYSICAL EXAM:     Vitals:    07/10/24 1110   BP: 126/84   Pulse: 64   Resp: 18    Body mass index is 25.39 kg/m².  Weight: 78 kg (171 lb 15.3 oz)   Height: 5' 9" (175.3 cm)     Physical Exam  Vitals reviewed.   Constitutional:       General: He is not in acute distress.     Appearance: Normal appearance. He is well-developed. He is not diaphoretic.   HENT:      Head: Normocephalic.   Eyes:      Extraocular Movements: Extraocular movements intact.   Neck:      Vascular: No carotid bruit or JVD.   Cardiovascular:      Rate and Rhythm: Normal rate and regular rhythm.      Pulses: Normal pulses.      Heart sounds: Normal heart sounds.   Pulmonary:      Effort: Pulmonary effort is normal.      Breath sounds: Normal breath sounds. No wheezing, rhonchi or rales.   Chest:      Chest wall: No tenderness.   Abdominal:      General: Bowel sounds are normal. There is no distension.      Palpations: Abdomen is soft.      Tenderness: There is no abdominal tenderness.   Musculoskeletal:      Right lower leg: No edema.      Left lower leg: No edema.   Skin:     General: Skin is warm and dry.      Coloration: Skin is not jaundiced or pale.      Findings: No erythema. " "  Neurological:      General: No focal deficit present.      Mental Status: He is alert and oriented to person, place, and time.      Motor: No weakness.   Psychiatric:         Speech: Speech normal.         Behavior: Behavior normal.         Thought Content: Thought content normal.         DATA:   EKG: (personally reviewed tracing)    Results for orders placed or performed during the hospital encounter of 06/09/24   EKG 12-lead    Collection Time: 06/09/24  6:57 AM   Result Value Ref Range    QRS Duration 84 ms    [facility] QTC Calculation 467 ms    Narrative    Test Reason : R07.9,    Vent. Rate : 084 BPM     Atrial Rate : 084 BPM     P-R Int : 150 ms          QRS Dur : 084 ms      QT Int : 396 ms       P-R-T Axes : 070 064 064 degrees     QTc Int : 467 ms    Normal sinus rhythm  Normal ECG  When compared with ECG of 09-JUN-2024 05:42,  No significant change was found  Confirmed by Sheng Bill MD ([id number]) on 6/10/2024 3:50:52 PM    Referred By: [email or web address]   SELF           Confirmed By:Sheng Bill MD        Laboratory:  CBC:  Recent Labs   Lab 11/28/23 0911 05/18/24  1031 06/09/24  0505   WBC 3.40 L 3.57 L 4.53   Hemoglobin 15.3 15.3 15.0   Hematocrit 46.5 47.1 46.1   Platelets 277 276 285       CHEMISTRIES:  Recent Labs   Lab 11/28/23 0911 05/18/24  1031 06/09/24  0505   Glucose 100 102 118 H   Sodium 143 141 144   Potassium 5.0 4.4 3.9   BUN 16 14 14   Creatinine 0.9 0.8 0.9   Calcium 10.1 9.5 9.2       CARDIAC BIOMARKERS:  Recent Labs   Lab 06/09/24  0505 06/09/24  0710   Troponin I 0.014 <0.006       COAGS:        LIPIDS/LFTS:  Recent Labs   Lab 11/28/23 0911 05/18/24  1031 06/09/24  0505   AST 28 21 32   ALT 29 27 39       No results found for: "HGBA1C"     The ASCVD Risk score (Lonnie CORRALES, et al., 2019) failed to calculate for the following reasons:    Cannot find a previous HDL lab    Cannot find a previous total cholesterol lab      Cardiovascular Testing:    No echocardiogram results found for the past 12 " months     No cardiac monitor results found for the past 12 months         ASSESSMENT:     Chest pain  MS    PLAN:     Chest pain: Exercise stress echocardiogram for evaluation.  Return to clinic 1 month.           Matteo Dey MD, MPH, FACC, [facility]

## 2024-07-16 ENCOUNTER — TELEPHONE (OUTPATIENT)
Dept: CARDIOLOGY | Facility: CLINIC | Age: 54
End: 2024-07-16
Payer: COMMERCIAL

## 2024-07-16 DIAGNOSIS — R07.89 OTHER CHEST PAIN: Primary | ICD-10-CM

## 2024-07-16 NOTE — TELEPHONE ENCOUNTER
----- Message from Matteo Dey MD sent at 7/16/2024 12:12 PM CDT -----  Regarding: Tests  Completed. Stress EKG ordered. Echo ordered.  ----- Message -----  From: Orlin Velazquez MA  Sent: 7/16/2024  12:00 PM CDT  To: Matteo Dey MD  Subject: FW: Self .[phone]                           Stress Echo denied.  ----- Message -----  From: Matthew Ojeda  Sent: 7/16/2024  11:46 AM CDT  To: Yissel Felipe Staff  Subject: Self .[phone]                               Type: Patient Call Back     What is the request in detail: Please all pt back with info on if MELONIE reached out to staff about the denial of his echo he has scheduled next week     Can the clinic reply by [facility]? No     Would the patient rather a call back or a response via [facility]? Call back    Best call back number: .[phone]      Additional Information:    Thank you.

## 2024-07-16 NOTE — TELEPHONE ENCOUNTER
Spoke with Mr. Nolasco and scheduled new tests, rescheduled follow up with Dr. Dey. Patient voiced understanding, did not request any further assistance.

## 2024-07-25 ENCOUNTER — PATIENT MESSAGE (OUTPATIENT)
Dept: PSYCHIATRY | Facility: CLINIC | Age: 54
End: 2024-07-25
Payer: COMMERCIAL

## 2024-08-05 ENCOUNTER — PATIENT MESSAGE (OUTPATIENT)
Dept: PSYCHIATRY | Facility: CLINIC | Age: 54
End: 2024-08-05
Payer: COMMERCIAL

## 2024-08-29 ENCOUNTER — HOSPITAL ENCOUNTER (OUTPATIENT)
Dept: CARDIOLOGY | Facility: HOSPITAL | Age: 54
Discharge: HOME | End: 2024-08-29
Attending: INTERNAL MEDICINE
Payer: COMMERCIAL

## 2024-08-29 DIAGNOSIS — R07.89 OTHER CHEST PAIN: ICD-10-CM

## 2024-08-29 LAB
ASCENDING AORTA: 2.75 CM
AV INDEX (PROSTH): 0.88
AV MEAN GRADIENT: 3 MMHG
AV PEAK GRADIENT: 6 MMHG
AV VALVE AREA BY VELOCITY RATIO: 2.86 CM²
AV VALVE AREA: 2.89 CM²
AV VELOCITY RATIO: 0.87
CV ECHO LV RWT: 0.47 CM
CV STRESS BASE HR: 75 BPM
DIASTOLIC BLOOD PRESSURE: 90 MMHG
DOP CALC AO PEAK VEL: 1.19 M/S
DOP CALC AO VTI: 24.3 CM
DOP CALC LVOT AREA: 3.3 CM2
DOP CALC LVOT DIAMETER: 2.04 CM
DOP CALC LVOT PEAK VEL: 1.04 M/S
DOP CALC LVOT STROKE VOLUME: 70.24 CM3
DOP CALC MV VTI: 19.9 CM
DOP CALCLVOT PEAK VEL VTI: 21.5 CM
E WAVE DECELERATION TIME: 183.39 MSEC
E/A RATIO: 1.04
E/E' RATIO: 5.89 M/S
ECHO LV POSTERIOR WALL: 1.01 CM (ref 0.6–1.1)
FRACTIONAL SHORTENING: 36 % (ref 28–44)
INTERVENTRICULAR SEPTUM: 1.02 CM (ref 0.6–1.1)
IVC DIAMETER: 1.77 CM
IVRT: 94.2 MSEC
LA MAJOR: 4.9 CM
LA MINOR: 3.53 CM
LA WIDTH: 3.5 CM
LEFT ATRIUM SIZE: 3.05 CM
LEFT ATRIUM VOLUME: 37.24 CM3
LEFT INTERNAL DIMENSION IN SYSTOLE: 2.73 CM (ref 2.1–4)
LEFT VENTRICLE DIASTOLIC VOLUME: 81.5 ML
LEFT VENTRICLE SYSTOLIC VOLUME: 27.65 ML
LEFT VENTRICULAR INTERNAL DIMENSION IN DIASTOLE: 4.27 CM (ref 3.5–6)
LEFT VENTRICULAR MASS: 143.86 G
LV LATERAL E/E' RATIO: 5.09 M/S
LV SEPTAL E/E' RATIO: 7 M/S
LVED V (TEICH): 81.5 ML
LVES V (TEICH): 27.65 ML
LVOT MG: 2.39 MMHG
LVOT MV: 0.72 CM/S
MV MEAN GRADIENT: 1 MMHG
MV PEAK A VEL: 0.54 M/S
MV PEAK E VEL: 0.56 M/S
MV PEAK GRADIENT: 2 MMHG
MV STENOSIS PRESSURE HALF TIME: 53.18 MS
MV VALVE AREA BY CONTINUITY EQUATION: 3.53 CM2
MV VALVE AREA P 1/2 METHOD: 4.14 CM2
OHS CV CPX 1 MINUTE RECOVERY HEART RATE: 142 BPM
OHS CV CPX 85 PERCENT MAX PREDICTED HEART RATE MALE: 144
OHS CV CPX ESTIMATED METS: 10
OHS CV CPX MAX PREDICTED HEART RATE: 169
OHS CV CPX PATIENT IS FEMALE: 0
OHS CV CPX PATIENT IS MALE: 1
OHS CV CPX PEAK DIASTOLIC BLOOD PRESSURE: 87 MMHG
OHS CV CPX PEAK HEAR RATE: 176 BPM
OHS CV CPX PEAK RATE PRESSURE PRODUCT: NORMAL
OHS CV CPX PEAK SYSTOLIC BLOOD PRESSURE: 201 MMHG
OHS CV CPX PERCENT MAX PREDICTED HEART RATE ACHIEVED: 104
OHS CV CPX RATE PRESSURE PRODUCT PRESENTING: 9750
OHS CV RV/LV RATIO: 0.62 CM
PULM VEIN S/D RATIO: 1.35
PV PEAK D VEL: 0.34 M/S
PV PEAK GRADIENT: 3 MMHG
PV PEAK S VEL: 0.46 M/S
PV PEAK VELOCITY: 0.85 M/S
RA MAJOR: 4.31 CM
RA PRESSURE ESTIMATED: 3 MMHG
RA WIDTH: 3.2 CM
RIGHT VENTRICLE DIASTOLIC BASEL DIMENSION: 2.7 CM
RIGHT VENTRICULAR END-DIASTOLIC DIMENSION: 2.66 CM
SINUS: 3.15 CM
STJ: 2.62 CM
STRESS ECHO POST EXERCISE DUR MIN: 9 MINUTES
STRESS ECHO POST EXERCISE DUR SEC: 0 SECONDS
SYSTOLIC BLOOD PRESSURE: 130 MMHG
TDI LATERAL: 0.11 M/S
TDI SEPTAL: 0.08 M/S
TDI: 0.1 M/S
TV PEAK SYSTOLIC PULMONARY ARTERY PRESSURE: 3 MMHG

## 2024-08-29 PROCEDURE — 93306 TTE W/DOPPLER COMPLETE: CPT

## 2024-08-29 PROCEDURE — 93018 CV STRESS TEST I&R ONLY: CPT | Mod: ,,, | Performed by: INTERNAL MEDICINE

## 2024-08-29 PROCEDURE — 93016 CV STRESS TEST SUPVJ ONLY: CPT | Mod: ,,, | Performed by: INTERNAL MEDICINE

## 2024-08-29 PROCEDURE — 93017 CV STRESS TEST TRACING ONLY: CPT

## 2024-09-06 ENCOUNTER — OFFICE VISIT (OUTPATIENT)
Dept: CARDIOLOGY | Facility: CLINIC | Age: 54
End: 2024-09-06
Payer: COMMERCIAL

## 2024-09-06 VITALS
DIASTOLIC BLOOD PRESSURE: 76 MMHG | BODY MASS INDEX: 26.02 KG/M2 | OXYGEN SATURATION: 98 % | HEIGHT: 69 IN | WEIGHT: 175.69 LBS | SYSTOLIC BLOOD PRESSURE: 112 MMHG | HEART RATE: 80 BPM

## 2024-09-06 DIAGNOSIS — R07.89 OTHER CHEST PAIN: Primary | ICD-10-CM

## 2024-09-06 DIAGNOSIS — G35 MS (MULTIPLE SCLEROSIS): ICD-10-CM

## 2024-09-06 LAB
OHS QRS DURATION: 86 MS
OHS QTC CALCULATION: 432 MS

## 2024-09-06 PROCEDURE — 99999 PR PBB SHADOW E&M-EST. PATIENT-LVL IV: CPT | Mod: PBBFAC,,, | Performed by: INTERNAL MEDICINE

## 2024-09-06 NOTE — PROGRESS NOTES
CARDIOLOGY CLINIC VISIT        HISTORY OF PRESENT ILLNESS:     07/10/2024: Helio Nolasco presents for evaluation of chest pain.  Seen 06/09/2024 in the emergency room.  Complaints of retrosternal chest tightness radiating to left arm and neck.  Blood pressure was 142/89.  EKG showed normal sinus rhythm.  Initially pain 10/10.  Nitroglycerin as well as GI cocktail with no improvement.  With prior MS relapses he states that he responded to steroids.  With muscle relaxants the pain decrease lately.  Eventually he received IV steroids which significantly reduced the discomfort.  Seen by his neurologist on 06/25/2024.  Follow-up MRIs of thoracic and cervical spine were performed.  No significant change from previous.  He was placed on a baby aspirin.    09/06/2024:  Patient exercised for 9 minutes.  Functional capacity 10 Mets.  EKG was negative for ischemia.  Hypertensive response to exercise 201/87.  Echocardiogram showed ejection fraction of 60-65%.    CARDIOVASCULAR HISTORY:     None    PAST MEDICAL HISTORY:     Past Medical History:   Diagnosis Date    Multiple sclerosis        PAST SURGICAL HISTORY:     Past Surgical History:   Procedure Laterality Date    HAND SURGERY      2 screws and a plate in hand    tissue surgery  10/2014    necrosis in left arm due rebif injections       ALLERGIES AND MEDICATION:   Review of patient's allergies indicates:  No Known Allergies     Medication List            Accurate as of September 6, 2024 11:01 AM. If you have any questions, ask your nurse or doctor.                CONTINUE taking these medications      calcium carbonate-vit D3-min 600 mg calcium- 400 unit Tab     CENTRUM ORAL     cetirizine 10 MG tablet  Commonly known as: ZYRTEC     cholecalciferol (vitamin D3) 50 mcg (2,000 unit) Cap capsule  Commonly known as: VITAMIN D3     cyanocobalamin 2000 MCG tablet     EScitalopram oxalate 10 MG tablet  Commonly known as: LEXAPRO     gabapentin 100 MG capsule  Commonly known as:  NEURONTIN     GLUCOSAMINE 1500 COMPLEX ORAL     latanoprost 0.005 % ophthalmic solution     meloxicam 15 MG tablet  Commonly known as: MOBIC     OCREVUS 30 mg/mL Soln  Generic drug: ocrelizumab  Infuse Ocrevus 600mg in 500mL of 0.9% NaCl IV every 24 weeks. Pre-meds: Solumedrol 100mg IVPB, Benadryl 50mg IVP, Tylenol 1000mg PO, Pepcid 20mg IVP. Pt is due for his next infusion on 11/26/22.     OMEGA-3 ORAL              SOCIAL HISTORY:     Social History     Socioeconomic History    Marital status:    Tobacco Use    Smoking status: Former     Types: Cigars    Smokeless tobacco: Never    Tobacco comments:     Pt stated he quit last yr 2020   Substance and Sexual Activity    Alcohol use: Yes     Alcohol/week: 0.0 standard drinks of alcohol     Comment: socially    Drug use: No    Sexual activity: Not Currently   Social History Narrative    Lives at home with his wife and son. Work full-time [employer] as a  6 days a week with 12 hour shifts.      Social Determinants of Health     Financial Resource Strain: Low Risk  (6/11/2024)    Overall Financial Resource Strain (CARDIA)     Difficulty of Paying Living Expenses: Not hard at all   Food Insecurity: No Food Insecurity (6/11/2024)    Hunger Vital Sign     Worried About Running Out of Food in the Last Year: Never true     Ran Out of Food in the Last Year: Never true   Physical Activity: Insufficiently Active (6/11/2024)    Exercise Vital Sign     Days of Exercise per Week: 5 days     Minutes of Exercise per Session: 20 min   Stress: No Stress Concern Present (6/11/2024)    [employer] - Occupational Stress Questionnaire     Feeling of Stress : Not at all   Housing Stability: Unknown (6/11/2024)    Housing Stability Vital Sign     Unable to Pay for Housing in the Last Year: No       FAMILY HISTORY:   No family history on file.    REVIEW OF SYSTEMS:   Review of Systems   Constitutional:  Negative for chills,  "diaphoresis, fever, malaise/fatigue and weight loss.   HENT:  Negative for congestion, hearing loss, sinus pain, sore throat and tinnitus.    Eyes:  Negative for blurred vision, double vision, photophobia and pain.   Respiratory:  Negative for cough, hemoptysis, sputum production, shortness of breath, wheezing and stridor.    Cardiovascular:  Positive for chest pain. Negative for palpitations, orthopnea, claudication, leg swelling and PND.   Gastrointestinal:  Negative for abdominal pain, blood in stool, heartburn, melena, nausea and vomiting.   Musculoskeletal:  Negative for back pain, falls, joint pain, myalgias and neck pain.   Neurological:  Negative for dizziness, tingling, tremors, sensory change, speech change, focal weakness, seizures, loss of consciousness, weakness and headaches.   Endo/Heme/Allergies:  Does not bruise/bleed easily.   Psychiatric/Behavioral:  Negative for depression, memory loss and substance abuse. The patient is not nervous/anxious.        PHYSICAL EXAM:     Vitals:    09/06/24 1047   BP: 112/76   Pulse: 80      Body mass index is 25.95 kg/m².  Weight: 79.7 kg (175 lb 11.3 oz)   Height: 5' 9" (175.3 cm)     Physical Exam  Vitals reviewed.   Constitutional:       General: He is not in acute distress.     Appearance: Normal appearance. He is well-developed. He is not diaphoretic.   HENT:      Head: Normocephalic.   Eyes:      Extraocular Movements: Extraocular movements intact.   Neck:      Vascular: No carotid bruit or JVD.   Cardiovascular:      Rate and Rhythm: Normal rate and regular rhythm.      Pulses: Normal pulses.      Heart sounds: Normal heart sounds.   Pulmonary:      Effort: Pulmonary effort is normal.      Breath sounds: Normal breath sounds. No wheezing, rhonchi or rales.   Chest:      Chest wall: No tenderness.   Abdominal:      General: Bowel sounds are normal. There is no distension.      Palpations: Abdomen is soft.      Tenderness: There is no abdominal tenderness. " "  Musculoskeletal:      Right lower leg: No edema.      Left lower leg: No edema.   Skin:     General: Skin is warm and dry.      Coloration: Skin is not jaundiced or pale.      Findings: No erythema.   Neurological:      General: No focal deficit present.      Mental Status: He is alert and oriented to person, place, and time.      Motor: No weakness.   Psychiatric:         Speech: Speech normal.         Behavior: Behavior normal.         Thought Content: Thought content normal.         DATA:   EKG: (personally reviewed tracing)    Results for orders placed or performed during the hospital encounter of 06/09/24   EKG 12-lead    Collection Time: 06/09/24  6:57 AM   Result Value Ref Range    QRS Duration 84 ms    [facility] QTC Calculation 467 ms    Narrative    Test Reason : R07.9,    Vent. Rate : 084 BPM     Atrial Rate : 084 BPM     P-R Int : 150 ms          QRS Dur : 084 ms      QT Int : 396 ms       P-R-T Axes : 070 064 064 degrees     QTc Int : 467 ms    Normal sinus rhythm  Normal ECG  When compared with ECG of 09-JUN-2024 05:42,  No significant change was found  Confirmed by Sheng Bill MD ([id number]) on 6/10/2024 3:50:52 PM    Referred By: [email or web address]   SELF           Confirmed By:Sheng Bill MD        Laboratory:  CBC:  Recent Labs   Lab 11/28/23 0911 05/18/24  1031 06/09/24  0505   WBC 3.40 L 3.57 L 4.53   Hemoglobin 15.3 15.3 15.0   Hematocrit 46.5 47.1 46.1   Platelets 277 276 285       CHEMISTRIES:  Recent Labs   Lab 11/28/23  0911 05/18/24  1031 06/09/24  0505   Glucose 100 102 118 H   Sodium 143 141 144   Potassium 5.0 4.4 3.9   BUN 16 14 14   Creatinine 0.9 0.8 0.9   Calcium 10.1 9.5 9.2       CARDIAC BIOMARKERS:  Recent Labs   Lab 06/09/24  0505 06/09/24  0710   Troponin I 0.014 <0.006       COAGS:        LIPIDS/LFTS:  Recent Labs   Lab 11/28/23 0911 05/18/24  1031 06/09/24  0505   AST 28 21 32   ALT 29 27 39       No results found for: "HGBA1C"     The ASCVD Risk score (Lonnie DK, et al., 2019) failed to " calculate for the following reasons:    Cannot find a previous HDL lab    Cannot find a previous total cholesterol lab      Cardiovascular Testing:    Exercise EKG 08/29/2024:      The patient exercised for 9 minutes 0 seconds on a Haider protocol, corresponding to a functional capacity of 10METS, achieving a peak heart rate of 176 bpm, which is 104% of the age predicted maximum heart rate. The patient experienced no angina during the test.    The ECG portion of the study is negative for ischemia.    The patient reported no chest pain during the stress test.    There were no arrhythmias during stress.    Echo    Result Date: 8/29/2024    Left Ventricle: The left ventricle is normal in size. There is mild   concentric hypertrophy. There is normal systolic function with a visually   estimated ejection fraction of 60 - 65%.    Right Ventricle: Normal right ventricular cavity size. Systolic   function is normal.    IVC/SVC: Normal venous pressure at 3 mmHg.          No cardiac monitor results found for the past 12 months         ASSESSMENT:     Chest pain  MS    PLAN:     Chest pain:  Echocardiogram showed normal function.  GXT negative for ischemia.  Okay to discontinue aspirin.  Return to clinic 1 year.           Matteo Dey MD, MPH, FACC, [facility]

## 2024-11-11 ENCOUNTER — HOSPITAL ENCOUNTER (OUTPATIENT)
Dept: RADIOLOGY | Facility: HOSPITAL | Age: 54
Discharge: HOME | End: 2024-11-11
Attending: PSYCHIATRY & NEUROLOGY
Payer: COMMERCIAL

## 2024-11-11 PROCEDURE — 70551 MRI BRAIN STEM W/O DYE: CPT | Mod: 26,,, | Performed by: RADIOLOGY

## 2024-11-11 PROCEDURE — 70551 MRI BRAIN STEM W/O DYE: CPT | Mod: TC

## 2024-11-21 ENCOUNTER — LAB VISIT (OUTPATIENT)
Dept: LAB | Facility: HOSPITAL | Age: 54
End: 2024-11-21
Payer: COMMERCIAL

## 2024-11-21 ENCOUNTER — OFFICE VISIT (OUTPATIENT)
Dept: NEUROLOGY | Facility: CLINIC | Age: 54
End: 2024-11-21
Payer: COMMERCIAL

## 2024-11-21 VITALS
BODY MASS INDEX: 26.06 KG/M2 | WEIGHT: 175.94 LBS | DIASTOLIC BLOOD PRESSURE: 77 MMHG | HEIGHT: 69 IN | SYSTOLIC BLOOD PRESSURE: 120 MMHG

## 2024-11-21 DIAGNOSIS — Z79.899 HIGH RISK MEDICATION USE: ICD-10-CM

## 2024-11-21 DIAGNOSIS — G35 MULTIPLE SCLEROSIS: ICD-10-CM

## 2024-11-21 DIAGNOSIS — Z71.89 COUNSELING REGARDING GOALS OF CARE: ICD-10-CM

## 2024-11-21 DIAGNOSIS — Z29.89 PROPHYLACTIC IMMUNOTHERAPY: ICD-10-CM

## 2024-11-21 DIAGNOSIS — G35 MULTIPLE SCLEROSIS: Primary | ICD-10-CM

## 2024-11-21 LAB
25(OH)D3+25(OH)D2 SERPL-MCNC: 48 NG/ML (ref 30–96)
ALBUMIN SERPL BCP-MCNC: 4.3 G/DL (ref 3.5–5.2)
ALP SERPL-CCNC: 68 U/L (ref 40–150)
ALT SERPL W/O P-5'-P-CCNC: 34 U/L (ref 10–44)
ANION GAP SERPL CALC-SCNC: 9 MMOL/L (ref 8–16)
AST SERPL-CCNC: 26 U/L (ref 10–40)
BASOPHILS # BLD AUTO: 0.06 K/UL (ref 0–0.2)
BASOPHILS NFR BLD: 1.6 % (ref 0–1.9)
BILIRUB SERPL-MCNC: 0.6 MG/DL (ref 0.1–1)
BUN SERPL-MCNC: 13 MG/DL (ref 6–20)
CALCIUM SERPL-MCNC: 9.4 MG/DL (ref 8.7–10.5)
CHLORIDE SERPL-SCNC: 104 MMOL/L (ref 95–110)
CO2 SERPL-SCNC: 27 MMOL/L (ref 23–29)
CREAT SERPL-MCNC: 1 MG/DL (ref 0.5–1.4)
DIFFERENTIAL METHOD BLD: ABNORMAL
EOSINOPHIL # BLD AUTO: 0.1 K/UL (ref 0–0.5)
EOSINOPHIL NFR BLD: 1.9 % (ref 0–8)
ERYTHROCYTE [DISTWIDTH] IN BLOOD BY AUTOMATED COUNT: 12 % (ref 11.5–14.5)
EST. GFR  (NO RACE VARIABLE): >60 ML/MIN/1.73 M^2
GLUCOSE SERPL-MCNC: 94 MG/DL (ref 70–110)
HBV CORE AB SERPL QL IA: NORMAL
HBV SURFACE AG SERPL QL IA: NORMAL
HCT VFR BLD AUTO: 44.3 % (ref 40–54)
HGB BLD-MCNC: 14.4 G/DL (ref 14–18)
IGA SERPL-MCNC: 164 MG/DL (ref 40–350)
IGG SERPL-MCNC: 1182 MG/DL (ref 650–1600)
IGM SERPL-MCNC: 100 MG/DL (ref 50–300)
IMM GRANULOCYTES # BLD AUTO: 0.02 K/UL (ref 0–0.04)
IMM GRANULOCYTES NFR BLD AUTO: 0.5 % (ref 0–0.5)
LYMPHOCYTES # BLD AUTO: 1.3 K/UL (ref 1–4.8)
LYMPHOCYTES NFR BLD: 34 % (ref 18–48)
MCH RBC QN AUTO: 29.1 PG (ref 27–31)
MCHC RBC AUTO-ENTMCNC: 32.5 G/DL (ref 32–36)
MCV RBC AUTO: 90 FL (ref 82–98)
MONOCYTES # BLD AUTO: 0.5 K/UL (ref 0.3–1)
MONOCYTES NFR BLD: 12.1 % (ref 4–15)
NEUTROPHILS # BLD AUTO: 1.9 K/UL (ref 1.8–7.7)
NEUTROPHILS NFR BLD: 49.9 % (ref 38–73)
NRBC BLD-RTO: 0 /100 WBC
PLATELET # BLD AUTO: 276 K/UL (ref 150–450)
PMV BLD AUTO: 9.8 FL (ref 9.2–12.9)
POTASSIUM SERPL-SCNC: 4.3 MMOL/L (ref 3.5–5.1)
PROT SERPL-MCNC: 7.3 G/DL (ref 6–8.4)
RBC # BLD AUTO: 4.95 M/UL (ref 4.6–6.2)
SODIUM SERPL-SCNC: 140 MMOL/L (ref 136–145)
WBC # BLD AUTO: 3.71 K/UL (ref 3.9–12.7)

## 2024-11-21 PROCEDURE — 82306 VITAMIN D 25 HYDROXY: CPT | Performed by: CLINICAL NURSE SPECIALIST

## 2024-11-21 PROCEDURE — 99999 PR PBB SHADOW E&M-EST. PATIENT-LVL III: CPT | Mod: PBBFAC,,, | Performed by: CLINICAL NURSE SPECIALIST

## 2024-11-21 PROCEDURE — 1159F MED LIST DOCD IN RCRD: CPT | Mod: CPTII,S$GLB,, | Performed by: CLINICAL NURSE SPECIALIST

## 2024-11-21 PROCEDURE — 3074F SYST BP LT 130 MM HG: CPT | Mod: CPTII,S$GLB,, | Performed by: CLINICAL NURSE SPECIALIST

## 2024-11-21 PROCEDURE — 36415 COLL VENOUS BLD VENIPUNCTURE: CPT | Performed by: CLINICAL NURSE SPECIALIST

## 2024-11-21 PROCEDURE — 80053 COMPREHEN METABOLIC PANEL: CPT | Performed by: CLINICAL NURSE SPECIALIST

## 2024-11-21 PROCEDURE — 87340 HEPATITIS B SURFACE AG IA: CPT | Performed by: CLINICAL NURSE SPECIALIST

## 2024-11-21 PROCEDURE — G2211 COMPLEX E/M VISIT ADD ON: HCPCS | Mod: S$GLB,,, | Performed by: CLINICAL NURSE SPECIALIST

## 2024-11-21 PROCEDURE — 99214 OFFICE O/P EST MOD 30 MIN: CPT | Mod: S$GLB,,, | Performed by: CLINICAL NURSE SPECIALIST

## 2024-11-21 PROCEDURE — 3008F BODY MASS INDEX DOCD: CPT | Mod: CPTII,S$GLB,, | Performed by: CLINICAL NURSE SPECIALIST

## 2024-11-21 PROCEDURE — 82784 ASSAY IGA/IGD/IGG/IGM EACH: CPT | Performed by: CLINICAL NURSE SPECIALIST

## 2024-11-21 PROCEDURE — 3078F DIAST BP <80 MM HG: CPT | Mod: CPTII,S$GLB,, | Performed by: CLINICAL NURSE SPECIALIST

## 2024-11-21 PROCEDURE — 86704 HEP B CORE ANTIBODY TOTAL: CPT | Performed by: CLINICAL NURSE SPECIALIST

## 2024-11-21 PROCEDURE — 85025 COMPLETE CBC W/AUTO DIFF WBC: CPT | Performed by: CLINICAL NURSE SPECIALIST

## 2024-11-21 NOTE — Clinical Note
He reports that his eye doctor hold him he has mild glaucoma, likely due to steroid use. Do you think we should eliminate steroid as pre-med for Ocrevus?

## 2024-11-21 NOTE — PROGRESS NOTES
Subjective:       Patient ID: Helio Nolasco is a 54 y.o. male who presents today for a routine clinic visit for MS.  He was last seen by Dr. Junior in June 2024. The history has been provided by the patient.       MS HPI:  History of Present Illness    DMT:  Ocrevus--scheduled 12/20/24  Side effects from DMT? No  Taking vitamin D3 as recommended? 2000 units daily +MVI and Caltrate +D     CHIEF COMPLAINT:  Patient presents today for follow-up of multiple sclerosis.    MULTIPLE SCLEROSIS MANAGEMENT:  He receives Ocrevus infusion treatment for MS every six months, with the last infusion in June and the next scheduled for December 20th. He expresses satisfaction with the current treatment regimen. He denies any issues with the current treatment and reports doing well on the medication. Brain MRI results are stable compared to previous scans from 2022 and 2023. The cervical spine MRI reveals degenerative changes, including an osteophyte complex and severe foraminal narrowing. The thoracic spine MRI shows disc bulging, similar to prior imaging and without cord compression or stenosis. There were no new lesions on the spine MRI.     SYMPTOMS AND QUALITY OF LIFE:  He denies abnormal fatigue, sleeping well and typically falling asleep by 9 PM. He reports no bowel or bladder issues or muscle spasms. He denies significant memory concerns, attributing occasional forgetfulness to normal aging. His mood is good without depression or anxiety. He denies mobility issues or falls. He notes arthritis in his hands, exacerbated by cold weather, managed with OTC pain relievers. Nerve pain is controlled, with only one instance of mild discomfort that resolved with gabapentin. He denies swallowing difficulties. He has heat sensitivity during summer months and uses cooling devices to mitigate symptoms.     MEDICATIONS AND SUPPLEMENTS:  He takes gabapentin as prescribed for nerve pain, though he has only used it once since receiving the  "prescription. He keeps it on hand for symptom recurrence. He also takes B12 supplementation, reporting it helps with energy levels.     PREVIOUS CHEST PAIN EPISODE:  He reports a previous episode of chest pain over the summer, which prompted a full cardiac workup including EKG, echocardiogram, and stress test. All cardiac evaluations were normal. The chest pain has not recurred since that episode. He believes the pain may have been an "MS hug" and notes possible exertion as a contributing factor, as he was moving furniture on the day of the incident. The pain was successfully treated with muscle relaxers and steroids.    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 He reports taking numerous steps daily, with over 5,700 steps recorded before midday today. He denies engaging in additional exercise outside of work.    VACCINATIONS AND HEALTH STATUS:  He is aware of the need to avoid live virus vaccines. He denies any serious infections, emergency room visits, or hospitalizations since June.    OPHTHALMOLOGIC:  He mentions slight glaucoma formation, which is being monitored by his eye doctor. He reports that his eye doctor feels this is due to steroid treatments.         SOCIAL HISTORY  Social History     Tobacco Use    Smoking status: Former     Types: Cigars    Smokeless tobacco: Never    Tobacco comments:     Pt stated he quit last yr 2020   Substance Use Topics    Alcohol use: Yes     Alcohol/week: 0.0 standard drinks of alcohol     Comment: socially    Drug use: No     Living arrangements - the patient lives with wife and son   Employment: works full-time at the [employer]        11/14/2024     9:37 AM   REVIEW OF SYMPTOMS   Do you feel abnormally tired on most days? No --B12 helps him to get through the day    Do you feel you generally sleep well? Yes     Do you have difficulty controlling your bladder?  No    Do you have difficulty " controlling your bowels?  No    Do you have frequent muscle cramps, tightness or spasms in your limbs?  No    Do you have new visual symptoms?  No --monitoring glaucoma (steroid-induced)   Do you have worsening difficulty with your memory or thinking? No --forgets why she walks into a room    Do you have worsening symptoms of anxiety or depression?  No    For patients who walk, Do you have more difficulty walking?  No    Have you fallen since your last visit?  No    For patients who use wheelchairs: Do you have any skin wounds or breakdown? Not Applicable    Do you have difficulty using your hands?  No --arthritis in hands worsens with the cold weather; takes Advil or Tylenol as needed    Do you have shooting or burning pain? No    Do you have difficulty with sexual function?  No    If you are sexually active, are you using birth control? Y/N  N/A Not Applicable    Do you often choke when swallowing liquids or solid food?  No    Do you experience worsening symptoms when overheated? Yes --tries to limit how much time spent in the heat during the summer; uses cooling devices    Do you need any new equipment such as a wheelchair, walker or shower chair? No    Do you receive co-pay financial assistance for your principal MS medicine? Yes    Would you be interested in participating in an MS research trial in the future? No    For patients on Gilenya, Tecfidera, Aubagio, Rituxan, Ocrevus, Tysabri, Lemtrada or Methotrexate, are you aware that you should NOT receive live virus vaccines?  Yes    Do you feel you have adequate family/friend support?  Yes    Do you have health insurance?   Yes    Are you currently employed? Yes    Do you receive SSDI/SSI?  Not Applicable    Do you use marijuana or cannabis products? No    Have you been diagnosed with a urinary tract infection since your last visit here? No    Have you been diagnosed with a respiratory tract infection since your last visit here? No    Have you been to the  emergency room since your last visit here? No    Have you been hospitalized since your last visit here?  No               Objective:        1. 25 foot timed walk:      2024     1:00 PM 2024    11:30 AM   Timed 25 Foot Walk:   Did patient wear an AFO? No No   Was assistive device used? No No   Time for 25 Foot Walk (seconds) 3.3 3.3   Time for 25 Foot Walk (seconds)  3.5       Neurological Exam      Mental Status   Oriented to person, place, and time.   Attention: normal. Concentration: normal.   Speech: speech is normal   Level of consciousness: alert  Knowledge: good.   Normal comprehension.      Cranial Nerves      CN II   Visual acuity: normal with correction     CN III, IV, VI   Extraocular motions are normal.      CN V   Facial sensation intact.      CN VII   Facial expression full, symmetric.      CN VIII   Hearing: intact (finger rub)     CN IX, X   Palate: symmetric     CN XI   CN XI normal.      CN XII   Tongue deviation: none     Motor Exam   Muscle bulk: normal  Right arm tone: normal  Left arm tone: normal  Right leg tone: increased  Left leg tone: increased     Strength   Right neck flexion: 5/5  Left neck flexion: 5/5  Right neck extension: 5/5  Left neck extension: 5/5  Right deltoid: 5/5  Left deltoid: 5/5  Right biceps: 5/5  Left biceps: 5/5  Right triceps: 5/5  Left triceps: 5/5  Right wrist flexion: 5/5  Left wrist flexion: 5/5  Right wrist extension: 5/5  Left wrist extension: 5/5  Right interossei: 5/5  Left interossei: 5/5  Right iliopsoas: 5/5  Left iliopsoas: 5/5  Right quadriceps: 5/5  Left quadriceps: 5/5  Right hamstrin/5  Left hamstrin/5  Right anterior tibial: 5/5  Left anterior tibial: 5/5  Right gastroc: 5/5  Left gastroc: 5/5     Sensory Exam   Right arm vibration: normal  Left arm vibration: normal  Right leg vibration: normal  Left leg vibration: normal     Gait, Coordination, and Reflexes      Coordination   Romberg: negative  Finger to nose coordination:  normal  Heel to shin coordination: normal  Tandem walking coordination: slow, but steady     Reflexes   Right brachioradialis: 3+  Left brachioradialis: 3+  Right biceps: 3+  Left biceps: 3+  Right triceps: 3+  Left triceps: 3+  Right patellar: 3+  Left patellar: 3+  Right achilles: 3+  Left achilles: 3+  Right plantar: normal  Left plantar: normal  He is able to walk on toes and heels.   Normal RSM in UE and LE  He can hop 10 times on each foot.        Imaging:     Results for orders placed in visit on 11/28/23    MRI Brain Demyelinating Without Contrast    Impression  Stable white matter lesions intracranially consistent with history of demyelinating disease.  No new lesion is identified.      Electronically signed by: Kiko Velazquez  Date:    11/11/2024  Time:    10:49    Results for orders placed during the hospital encounter of 06/13/24    MRI Cervical Spine Demyelinating W W/O Contrast    Impression  1. Small foci of increased T2/STIR imaging within the cervical cord, similar to the prior study consistent with history of multiple sclerosis.  See above comments.  2. Multilevel mild degenerative changes.  3. No acute abnormality.      Electronically signed by: Zain Enrique  Date:    06/13/2024  Time:    18:22    Results for orders placed during the hospital encounter of 06/15/24    MRI Thoracic Spine Demyelinating W W/O Contrast    Impression  Lesions are suspected within the thoracic cord at T2 and T3, similar to the prior study with no new lesion or abnormal enhancement identified.      Electronically signed by: Kiko Velazquez  Date:    06/15/2024  Time:    15:27    Images were reviewed with the patient.   Labs:     Lab Results   Component Value Date    [id number] 50 11/28/2023    [id number] 47 03/31/2021    [id number] 51 09/03/2020     Lab Results   Component Value Date    WBC 4.53 06/09/2024    RBC 5.21 06/09/2024    HGB 15.0 06/09/2024    HCT 46.1 06/09/2024    MCV 89 06/09/2024    MCH 28.8  06/09/2024    MCHC 32.5 06/09/2024    RDW 11.9 06/09/2024     06/09/2024    MPV 9.1 (L) 06/09/2024    GRAN 1.6 (L) 06/09/2024    GRAN 36.0 (L) 06/09/2024    LYMPH 2.2 06/09/2024    LYMPH 47.7 06/09/2024    MONO 0.4 06/09/2024    MONO 9.7 06/09/2024    EOS 0.2 06/09/2024    BASO 0.06 06/09/2024    EOSINOPHIL 5.1 06/09/2024    BASOPHIL 1.3 06/09/2024     Sodium   Date Value Ref Range Status   06/09/2024 144 136 - 145 mmol/L Final     Potassium   Date Value Ref Range Status   06/09/2024 3.9 3.5 - 5.1 mmol/L Final     Chloride   Date Value Ref Range Status   06/09/2024 110 95 - 110 mmol/L Final     CO2   Date Value Ref Range Status   06/09/2024 20 (L) 23 - 29 mmol/L Final     Glucose   Date Value Ref Range Status   06/09/2024 118 (H) 70 - 110 mg/dL Final     BUN   Date Value Ref Range Status   06/09/2024 14 6 - 20 mg/dL Final     Creatinine   Date Value Ref Range Status   06/09/2024 0.9 0.5 - 1.4 mg/dL Final     Calcium   Date Value Ref Range Status   06/09/2024 9.2 8.7 - 10.5 mg/dL Final     Total Protein   Date Value Ref Range Status   06/09/2024 7.1 6.0 - 8.4 g/dL Final     Albumin   Date Value Ref Range Status   06/09/2024 3.9 3.5 - 5.2 g/dL Final     Total Bilirubin   Date Value Ref Range Status   06/09/2024 0.3 0.1 - 1.0 mg/dL Final     Comment:     For infants and newborns, interpretation of results should be based  on gestational age, weight and in agreement with clinical  observations.    Premature Infant recommended reference ranges:  Up to 24 hours.............<8.0 mg/dL  Up to 48 hours............<12.0 mg/dL  3-5 days..................<15.0 mg/dL  6-29 days.................<15.0 mg/dL       Alkaline Phosphatase   Date Value Ref Range Status   06/09/2024 59 55 - 135 U/L Final     AST   Date Value Ref Range Status   06/09/2024 32 10 - 40 U/L Final     ALT   Date Value Ref Range Status   06/09/2024 39 10 - 44 U/L Final     Anion Gap   Date Value Ref Range Status   06/09/2024 14 8 - 16 mmol/L Final      eGFR if    Date Value Ref Range Status   08/12/2019 >60 >60 mL/min/1.73 m^2 Final     eGFR if non    Date Value Ref Range Status   08/12/2019 >60 >60 mL/min/1.73 m^2 Final     Comment:     Calculation used to obtain the estimated glomerular filtration  rate (eGFR) is the CKD-EPI equation.              Diagnosis/Assessment/Plan:       Assessment & Plan    Reviewed stable brain MRI results, noting no new lesions  Assessed cervical spine MRI, noting degenerative changes and severe foraminal narrowing that may explain recent left-sided chest/arm pain episode  Considered lowering or eliminating steroid dose for Ocrevus infusions due to lack of previous reactions and to mitigate glaucoma risk; will discuss with Dr. Junior.   Evaluated patient's active lifestyle and symptom management, noting good control of MS symptoms with current treatment regimen  He is clinically stable; both neuro exam and walk-time are unchanged compared to prior.     MULTIPLE SCLEROSIS:  - Explained MRI findings  - Continue Ocrevus infusions every 6 months. Safety labs are planned for today. He is aware of the risks associated with immunosuppressant therapy, including increased risk of infection.   - Continue vitamin D 2000 IU daily, plus additional vitamin D in multivitamin and Caltrate. Will check Vitamin D level today.   - Continue B12 supplementation for energy management  - Brain MRI is due in 1 year and can be ordered at the next visit.     CERVICAL SPONDYLOSIS:  - Explained MRI findings, including degenerative changes in cervical spine.     FOLLOW-UP AND IMMUNIZATION:  - Follow up in 6 months with Dr. Junior.   - Contact office if any changes or concerns arise before next scheduled appointment.  The visit today is associated with current or anticipated ongoing medical care related to this patient's single serious condition/complex condition of multiple sclerosis.         Total time spent with patient: 22  minutes   Total time spent on encounter: 32 minutes         Problem List Items Addressed This Visit       Counseling regarding goals of care     Other Visit Diagnoses       Multiple sclerosis    -  Primary    Relevant Orders    CBC Auto Differential    Comprehensive Metabolic Panel    Hepatitis B Core Antibody, Total    Hepatitis B Surface Antigen    Immunoglobulins (IgG, IgA, IgM) Quantitative    Vitamin D    Prophylactic immunotherapy        High risk medication use                  This note was generated with the assistance of ambient listening technology. Verbal consent was obtained by the patient and accompanying visitor(s) for the recording of patient appointment to facilitate this note. I attest to having reviewed and edited the generated note for accuracy, though some syntax or spelling errors may persist. Please contact the author of this note for any clarification.

## 2024-11-29 ENCOUNTER — PATIENT MESSAGE (OUTPATIENT)
Dept: NEUROLOGY | Facility: CLINIC | Age: 54
End: 2024-11-29
Payer: COMMERCIAL

## 2024-12-16 ENCOUNTER — PATIENT MESSAGE (OUTPATIENT)
Dept: PSYCHIATRY | Facility: CLINIC | Age: 54
End: 2024-12-16
Payer: COMMERCIAL

## 2024-12-23 ENCOUNTER — TELEPHONE (OUTPATIENT)
Dept: NEUROLOGY | Facility: CLINIC | Age: 54
End: 2024-12-23
Payer: COMMERCIAL

## 2024-12-23 NOTE — TELEPHONE ENCOUNTER
----- Message from Pharmacist Jones sent at 12/20/2024  3:11 PM CST -----  Regarding: FW: pharm auth  Contact: Annabelle [phone]    ----- Message -----  From: Sanna Sanders RN  Sent: 12/20/2024   2:55 PM CST  To: Jones Davila PharmD  Subject: FW: pharm auth                                     ----- Message -----  From: Amanda Veras  Sent: 12/20/2024   8:15 AM CST  To: Sandi GOODE Staff  Subject: pharm auth                                       Annabelle/eliezer calling to advise Ocrevus has been approved. Decision letter has been sent. Pls call [phone]

## 2025-03-07 ENCOUNTER — PATIENT MESSAGE (OUTPATIENT)
Dept: PSYCHIATRY | Facility: CLINIC | Age: 55
End: 2025-03-07
Payer: COMMERCIAL

## 2025-04-24 ENCOUNTER — PATIENT MESSAGE (OUTPATIENT)
Dept: NEUROLOGY | Facility: CLINIC | Age: 55
End: 2025-04-24
Payer: COMMERCIAL

## 2025-05-08 ENCOUNTER — PATIENT MESSAGE (OUTPATIENT)
Dept: NEUROLOGY | Facility: CLINIC | Age: 55
End: 2025-05-08

## 2025-05-08 ENCOUNTER — OFFICE VISIT (OUTPATIENT)
Dept: NEUROLOGY | Facility: CLINIC | Age: 55
End: 2025-05-08
Payer: COMMERCIAL

## 2025-05-08 DIAGNOSIS — Z29.89 PROPHYLACTIC IMMUNOTHERAPY: ICD-10-CM

## 2025-05-08 DIAGNOSIS — Z71.89 COUNSELING REGARDING GOALS OF CARE: ICD-10-CM

## 2025-05-08 DIAGNOSIS — F17.290 CIGAR SMOKER: ICD-10-CM

## 2025-05-08 DIAGNOSIS — Z79.899 HIGH RISK MEDICATION USE: ICD-10-CM

## 2025-05-08 DIAGNOSIS — L43.9 LICHEN PLANUS: ICD-10-CM

## 2025-05-08 DIAGNOSIS — G35 MULTIPLE SCLEROSIS: Primary | ICD-10-CM

## 2025-05-08 PROCEDURE — G2211 COMPLEX E/M VISIT ADD ON: HCPCS | Mod: 95,,, | Performed by: CLINICAL NURSE SPECIALIST

## 2025-05-08 PROCEDURE — 1159F MED LIST DOCD IN RCRD: CPT | Mod: CPTII,95,, | Performed by: CLINICAL NURSE SPECIALIST

## 2025-05-08 PROCEDURE — 98007 SYNCH AUDIO-VIDEO EST HI 40: CPT | Mod: 95,,, | Performed by: CLINICAL NURSE SPECIALIST

## 2025-05-08 NOTE — Clinical Note
F/U with BB in 4 months in person  Spoke with dr hawley and called and talked to pt about her symptoms. Pt voiced not having belly pain that she feels like is getting worse. Pt denies being around anyone with respiratory illness. Per Frank pt need to go to urgent care to have chest xray. Pt understood to call back if she has any additional pain that she feels is surgery related.

## 2025-05-08 NOTE — Clinical Note
Patient has new diagnosis of lichen planus. He is agreeable to switch to Mavenclad with less sustained immunosuppression.   TH--please send patient release form to request records from his dermatologist, Dr. Evie Ulloa AND colonoscopy report from [facility], [place] in [place]. Please schedule labs in late May.    Jones, he is an occasional cigar smoker. Low dose chest CT order is in. MRI brain was in November. I think this is ok.   BB, FYI--please let me know if any changes are needed to plan. His next Ocrevus infusion would be due around 6/20, so we can plan to start Mavenclad after that.

## 2025-05-08 NOTE — PROGRESS NOTES
Subjective:          Patient ID: Helio Nolasco is a 54 y.o. male who presents today for a fit-in virtual visit for MS.  He was last seen in November 2024. The history has been provided by the patient.     The patient location is: his home   The chief complaint leading to consultation is: MS     Visit type: audiovisual    Face to Face time with patient: 30 minutes   40 minutes of total time spent on the encounter, which includes face to face time and non-face to face time preparing to see the patient (eg, review of tests), Obtaining and/or reviewing separately obtained history, Documenting clinical information in the electronic or other health record, Independently interpreting results (not separately reported) and communicating results to the patient/family/caregiver, or Care coordination (not separately reported).       Each patient to whom he or she provides medical services by telemedicine is:  (1) informed of the relationship between the physician and patient and the respective role of any other health care provider with respect to management of the patient; and (2) notified that he or she may decline to receive medical services by telemedicine and may withdraw from such care at any time.        MS HPI:  DMT: Ocrevus--scheduled 6/20/25  Side effects from DMT? No  Taking vitamin D3 as recommended? Yes  He has recently been diagnosed with lichen planus. He took steroids for 15 days, which did help, but then the areas of inflammation returned. He is currently taking metronidazole orally and clobetasol topically. The lesions are currently present on his hands, wrists, arms, chest, back, ankles, tops of feet. It can sometimes be itchy. He takes hydroxyzine at night and Zyrtec during the day.   He denies any recent infections.     Medications:  Current Outpatient Medications   Medication Sig    calcium carbonate-vit D3-min 600 mg calcium- 400 unit Tab Take 1 tablet by mouth once daily.    cetirizine (ZYRTEC) 10 MG  tablet Take 10 mg by mouth as needed.     cholecalciferol, vitamin D3, (VITAMIN D3) 50 mcg (2,000 unit) Cap capsule 1 capsule.    cyanocobalamin 2000 MCG tablet Take 1,000 mcg by mouth once daily.     escitalopram (LEXAPRO) 10 MG tablet Take 10 mg by mouth every evening.     FOLIC ACID/MV,FE,OTHER MIN (CENTRUM ORAL) Take by mouth once daily.     gabapentin (NEURONTIN) 100 MG capsule Take 100 mg by mouth 3 (three) times daily.    glucosamine/chondroitin/C/Chip (GLUCOSAMINE 1500 COMPLEX ORAL) Take by mouth.    latanoprost 0.005 % ophthalmic solution Place 1 drop into both eyes every evening.    meloxicam (MOBIC) 15 MG tablet Take 15 mg by mouth.    ocrelizumab (OCREVUS) 30 mg/mL Soln Infuse Ocrevus 600mg in 500mL of 0.9% NaCl IV every 24 weeks. Pre-meds: Solumedrol 100mg IVPB, Benadryl 50mg IVP, Tylenol 1000mg PO, Pepcid 20mg IVP. Pt is due for his next infusion on 11/26/22.    omega-3/dha/epa/fish oil (OMEGA-3 ORAL) Take 500 mg by mouth once daily.     No current facility-administered medications for this visit.       SOCIAL HISTORY  Social History[1]    Living arrangements - the patient lives with family                Objective:        1. 25 foot timed walk:      6/12/2024     1:00 PM 11/21/2024    11:30 AM   Timed 25 Foot Walk:   Did patient wear an AFO? No No   Was assistive device used? No No   Time for 25 Foot Walk (seconds) 3.3 3.3   Time for 25 Foot Walk (seconds)  3.5       Neurological Exam    Deferred   Imaging:     Results for orders placed in visit on 11/28/23    MRI Brain Demyelinating Without Contrast    Impression  Stable white matter lesions intracranially consistent with history of demyelinating disease.  No new lesion is identified.      Electronically signed by: Kiko Velazquez  Date:    11/11/2024  Time:    10:49      Results for orders placed during the hospital encounter of 06/13/24    MRI Cervical Spine Demyelinating W W/O Contrast    Impression  1. Small foci of increased T2/STIR imaging  within the cervical cord, similar to the prior study consistent with history of multiple sclerosis.  See above comments.  2. Multilevel mild degenerative changes.  3. No acute abnormality.      Electronically signed by: Zain Enrique  Date:    06/13/2024  Time:    18:22    Results for orders placed during the hospital encounter of 06/15/24    MRI Thoracic Spine Demyelinating W W/O Contrast    Impression  Lesions are suspected within the thoracic cord at T2 and T3, similar to the prior study with no new lesion or abnormal enhancement identified.      Electronically signed by: Kiko Velazquez  Date:    06/15/2024  Time:    15:27        Labs:     Lab Results   Component Value Date    AFTJMFXX91NN 48 11/21/2024    FVHHHWOM14GM 50 11/28/2023    OJHHNDQV52QO 47 03/31/2021       Lab Results   Component Value Date    WBC 3.71 (L) 11/21/2024    RBC 4.95 11/21/2024    HGB 14.4 11/21/2024    HCT 44.3 11/21/2024    MCV 90 11/21/2024    MCH 29.1 11/21/2024    MCHC 32.5 11/21/2024    RDW 12.0 11/21/2024     11/21/2024    MPV 9.8 11/21/2024    GRAN 1.9 11/21/2024    GRAN 49.9 11/21/2024    LYMPH 1.3 11/21/2024    LYMPH 34.0 11/21/2024    MONO 0.5 11/21/2024    MONO 12.1 11/21/2024    EOS 0.1 11/21/2024    BASO 0.06 11/21/2024    EOSINOPHIL 1.9 11/21/2024    BASOPHIL 1.6 11/21/2024     Sodium   Date Value Ref Range Status   11/21/2024 140 136 - 145 mmol/L Final     Potassium   Date Value Ref Range Status   11/21/2024 4.3 3.5 - 5.1 mmol/L Final     Chloride   Date Value Ref Range Status   11/21/2024 104 95 - 110 mmol/L Final     CO2   Date Value Ref Range Status   11/21/2024 27 23 - 29 mmol/L Final     Glucose   Date Value Ref Range Status   11/21/2024 94 70 - 110 mg/dL Final     BUN   Date Value Ref Range Status   11/21/2024 13 6 - 20 mg/dL Final     Creatinine   Date Value Ref Range Status   11/21/2024 1.0 0.5 - 1.4 mg/dL Final     Calcium   Date Value Ref Range Status   11/21/2024 9.4 8.7 - 10.5 mg/dL Final     Total  Protein   Date Value Ref Range Status   11/21/2024 7.3 6.0 - 8.4 g/dL Final     Albumin   Date Value Ref Range Status   11/21/2024 4.3 3.5 - 5.2 g/dL Final     Total Bilirubin   Date Value Ref Range Status   11/21/2024 0.6 0.1 - 1.0 mg/dL Final     Comment:     For infants and newborns, interpretation of results should be based  on gestational age, weight and in agreement with clinical  observations.    Premature Infant recommended reference ranges:  Up to 24 hours.............<8.0 mg/dL  Up to 48 hours............<12.0 mg/dL  3-5 days..................<15.0 mg/dL  6-29 days.................<15.0 mg/dL       Alkaline Phosphatase   Date Value Ref Range Status   11/21/2024 68 40 - 150 U/L Final     AST   Date Value Ref Range Status   11/21/2024 26 10 - 40 U/L Final     ALT   Date Value Ref Range Status   11/21/2024 34 10 - 44 U/L Final     Anion Gap   Date Value Ref Range Status   11/21/2024 9 8 - 16 mmol/L Final     eGFR if    Date Value Ref Range Status   08/12/2019 >60 >60 mL/min/1.73 m^2 Final     eGFR if non    Date Value Ref Range Status   08/12/2019 >60 >60 mL/min/1.73 m^2 Final     Comment:     Calculation used to obtain the estimated glomerular filtration  rate (eGFR) is the CKD-EPI equation.        Lab Results   Component Value Date    HEPBSAG Non-reactive 11/21/2024    HEPBSAB <3.00 04/15/2023    HEPBSAB Non-reactive 04/15/2023    HEPBCAB Non-reactive 11/21/2024           MS Impression and Plan:     NEURO MULTIPLE SCLEROSIS IMPRESSION:   MS Classification:  Relapsing-Remitting MS  Current DMT: ocrelizumab  Current DMT comment: Helio has new diagnosis of lichen planus, which may also be autoimmune in origin. There have been rare case reports of reactivation of lichen planus possibly related to ocrelizumab.--https://pubmed.ncbi.nlm.nih.gov/48387974/. Out of caution, Dr. Junior and I recommend a switch to Mavenclad, which has less sustained immunosuppression. The patient is  agreeable to this. We discussed mechanism of action of Mavenclad, side effects, and risk of malignancy and recommended cancer screenings prior to starting treatment. We will check safety labs in late May. He had a colonoscopy at [facility] recently. We will request these records. He is an occasional cigar smoker, so we will try to get low dose chest CT approved.   Symptom Management:  No change in symptom management  We will also request records from dermatology, Dr. Evie Ulloa.   MRI brain due in November.   He will follow up with Dr. Junior in 4 months.   The visit today is associated with current or anticipated ongoing medical care related to this patient's single serious condition/complex condition of multiple sclerosis.        RASHEEDA Simmons, CNS    Problem List Items Addressed This Visit       Counseling regarding goals of care     Other Visit Diagnoses         Multiple sclerosis    -  Primary    Relevant Orders    CBC Auto Differential    Comprehensive Metabolic Panel    [place]-Suppressor Ratio    Hepatitis A antibody, IgG    Hepatitis B Core Antibody, Total    Hepatitis B Surface Ab, Qualitative    Hepatitis B Surface Antigen    Hepatitis C Antibody    HIV 1/2 Ag/Ab (4th Gen)    Immunoglobulins (IgG, IgA, IgM) Quantitative    Quantiferon Gold TB    STRATIFY JCV ANTIBODY (with Index)    Strongyloides IgG Antibodies    Varicella Zoster Antibody, IgG    Treponema Pallidium Antibodies IgG, IgM      Cigar smoker        Relevant Orders    CT Chest Low Dose Diagnostic      Lichen planus          Prophylactic immunotherapy          High risk medication use                       [1]   Social History  Tobacco Use    Smoking status: Former     Types: Cigars    Smokeless tobacco: Never    Tobacco comments:     Pt stated he quit last yr 2020   Substance Use Topics    Alcohol use: Yes     Alcohol/week: 0.0 standard drinks of alcohol     Comment: socially    Drug use: No

## 2025-05-12 ENCOUNTER — TELEPHONE (OUTPATIENT)
Dept: NEUROLOGY | Facility: CLINIC | Age: 55
End: 2025-05-12
Payer: COMMERCIAL

## 2025-05-12 NOTE — TELEPHONE ENCOUNTER
----- Message from Lylette sent at 5/12/2025 12:34 PM CDT -----  Regarding: Pt Advice  Contact: [phone]  Missed Callback Pt returning callback from missed call. Requesting to speak with somebody in Dr. Junior  office. Please call.[phone]

## 2025-05-12 NOTE — TELEPHONE ENCOUNTER
----- Message from Kendy Clark sent at 5/10/2025  9:56 AM CDT -----  F/U with BB in 4 months in person

## 2025-05-13 ENCOUNTER — PATIENT MESSAGE (OUTPATIENT)
Dept: PSYCHIATRY | Facility: CLINIC | Age: 55
End: 2025-05-13
Payer: COMMERCIAL

## 2025-05-26 ENCOUNTER — TELEPHONE (OUTPATIENT)
Dept: NEUROLOGY | Facility: CLINIC | Age: 55
End: 2025-05-26
Payer: COMMERCIAL

## 2025-05-26 ENCOUNTER — LAB VISIT (OUTPATIENT)
Dept: LAB | Facility: HOSPITAL | Age: 55
End: 2025-05-26
Payer: COMMERCIAL

## 2025-05-26 DIAGNOSIS — Z79.899 HIGH RISK MEDICATION USE: ICD-10-CM

## 2025-05-26 DIAGNOSIS — G35 MULTIPLE SCLEROSIS: Primary | ICD-10-CM

## 2025-05-26 DIAGNOSIS — D84.9 IMMUNOSUPPRESSION: ICD-10-CM

## 2025-05-26 DIAGNOSIS — G35 MULTIPLE SCLEROSIS: ICD-10-CM

## 2025-05-26 LAB
ABSOLUTE EOSINOPHIL (OHS): 0.13 K/UL
ABSOLUTE MONOCYTE (OHS): 0.37 K/UL (ref 0.3–1)
ABSOLUTE NEUTROPHIL COUNT (OHS): 1.77 K/UL (ref 1.8–7.7)
ALBUMIN SERPL BCP-MCNC: 4 G/DL (ref 3.5–5.2)
ALP SERPL-CCNC: 60 UNIT/L (ref 40–150)
ALT SERPL W/O P-5'-P-CCNC: 116 UNIT/L (ref 10–44)
ANION GAP (OHS): 8 MMOL/L (ref 8–16)
AST SERPL-CCNC: 57 UNIT/L (ref 11–45)
BASOPHILS # BLD AUTO: 0.05 K/UL
BASOPHILS NFR BLD AUTO: 1.5 %
BILIRUB SERPL-MCNC: 0.5 MG/DL (ref 0.1–1)
BUN SERPL-MCNC: 12 MG/DL (ref 6–20)
CALCIUM SERPL-MCNC: 9.5 MG/DL (ref 8.7–10.5)
CHLORIDE SERPL-SCNC: 107 MMOL/L (ref 95–110)
CO2 SERPL-SCNC: 25 MMOL/L (ref 23–29)
CREAT SERPL-MCNC: 0.9 MG/DL (ref 0.5–1.4)
ERYTHROCYTE [DISTWIDTH] IN BLOOD BY AUTOMATED COUNT: 11.9 % (ref 11.5–14.5)
GFR SERPLBLD CREATININE-BSD FMLA CKD-EPI: >60 ML/MIN/1.73/M2
GLUCOSE SERPL-MCNC: 95 MG/DL (ref 70–110)
HAV AB SER QL IA: REACTIVE
HBV CORE AB SERPL QL IA: NORMAL
HBV SURFACE AB SER-ACNC: <3 MIU/ML
HBV SURFACE AB SERPL IA-ACNC: NORMAL M[IU]/ML
HBV SURFACE AG SERPL QL IA: NORMAL
HCT VFR BLD AUTO: 49.9 % (ref 40–54)
HCV AB SERPL QL IA: NORMAL
HGB BLD-MCNC: 15.5 GM/DL (ref 14–18)
HIV 1+2 AB+HIV1 P24 AG SERPL QL IA: NORMAL
IGA SERPL-MCNC: 156 MG/DL (ref 40–350)
IGG SERPL-MCNC: 1112 MG/DL (ref 650–1600)
IGM SERPL-MCNC: 100 MG/DL (ref 50–300)
IMM GRANULOCYTES # BLD AUTO: 0.01 K/UL (ref 0–0.04)
IMM GRANULOCYTES NFR BLD AUTO: 0.3 % (ref 0–0.5)
LYMPHOCYTES # BLD AUTO: 1 K/UL (ref 1–4.8)
MCH RBC QN AUTO: 28.6 PG (ref 27–31)
MCHC RBC AUTO-ENTMCNC: 31.1 G/DL (ref 32–36)
MCV RBC AUTO: 92 FL (ref 82–98)
NUCLEATED RBC (/100WBC) (OHS): 0 /100 WBC
PLATELET # BLD AUTO: 304 K/UL (ref 150–450)
PMV BLD AUTO: 9.8 FL (ref 9.2–12.9)
POTASSIUM SERPL-SCNC: 4.3 MMOL/L (ref 3.5–5.1)
PROT SERPL-MCNC: 7.2 GM/DL (ref 6–8.4)
RBC # BLD AUTO: 5.42 M/UL (ref 4.6–6.2)
RELATIVE EOSINOPHIL (OHS): 3.9 %
RELATIVE LYMPHOCYTE (OHS): 30 % (ref 18–48)
RELATIVE MONOCYTE (OHS): 11.1 % (ref 4–15)
RELATIVE NEUTROPHIL (OHS): 53.2 % (ref 38–73)
SODIUM SERPL-SCNC: 140 MMOL/L (ref 136–145)
T PALLIDUM IGG+IGM SER QL: NORMAL
WBC # BLD AUTO: 3.33 K/UL (ref 3.9–12.7)

## 2025-05-26 PROCEDURE — 86704 HEP B CORE ANTIBODY TOTAL: CPT

## 2025-05-26 PROCEDURE — 86360 T CELL ABSOLUTE COUNT/RATIO: CPT

## 2025-05-26 PROCEDURE — 87340 HEPATITIS B SURFACE AG IA: CPT

## 2025-05-26 PROCEDURE — 86803 HEPATITIS C AB TEST: CPT

## 2025-05-26 PROCEDURE — 82784 ASSAY IGA/IGD/IGG/IGM EACH: CPT | Mod: 59

## 2025-05-26 PROCEDURE — 82040 ASSAY OF SERUM ALBUMIN: CPT

## 2025-05-26 PROCEDURE — 86480 TB TEST CELL IMMUN MEASURE: CPT

## 2025-05-26 PROCEDURE — 86790 VIRUS ANTIBODY NOS: CPT

## 2025-05-26 PROCEDURE — 86593 SYPHILIS TEST NON-TREP QUANT: CPT

## 2025-05-26 PROCEDURE — 86706 HEP B SURFACE ANTIBODY: CPT | Mod: 59

## 2025-05-26 PROCEDURE — 85025 COMPLETE CBC W/AUTO DIFF WBC: CPT

## 2025-05-26 PROCEDURE — 87389 HIV-1 AG W/HIV-1&-2 AB AG IA: CPT

## 2025-05-26 PROCEDURE — 86787 VARICELLA-ZOSTER ANTIBODY: CPT

## 2025-05-26 PROCEDURE — 36415 COLL VENOUS BLD VENIPUNCTURE: CPT

## 2025-05-26 NOTE — TELEPHONE ENCOUNTER
----- Message from Kendy Clark sent at 5/10/2025  9:53 AM CDT -----  Patient has new diagnosis of lichen planus. He is agreeable to switch to Mavenclad with less sustained immunosuppression.     TH--please send patient release form to request records from his dermatologist, Dr. Evie Ulloa AND colonoscopy report from [facility] in [place]. Please schedule labs in late May.      Jones, he is an occasional cigar smoker. Low dose chest CT order is in. MRI brain was in November. I think this is ok.     YODIT, ARTURO--please let me know if any changes are needed to plan. His next Ocrevus infusion would be due around 6/20, so we can plan to start Mavenclad after that.

## 2025-05-26 NOTE — TELEPHONE ENCOUNTER
Mavenclad new start     Dosing:   Year 1 -78 kg   First cycle (month 1)  Day 1-2: Take 2 tablets daily  Take 3-5: Take 1 tablet daily  7 tablets in total    Second cycle (month 2)  Day 1-2: Take 2 tablets daily  Take 3-5: Take 1 tablet daily  7 tablets in total     Labs   5/26/25  WBC 3.33    AST 57,   IgG  1112  IgM  100  IgA 156  HBsAg - anti-Hbc - anti-Hbs -, no current or past infection, not immune  Hep C Ab  -  Hep A IgG +, immune  HIV -  Varicella IgG +, immune  TB gold -    Completing repeat CBC, Hepatic function panel, strongyloides and JCV antibody on 5/30    Cancer screenings:  Low-dose CT scan -   Waiting for results  Colonoscopy - 5/16/23, due 5/16/2033  Mammogram -  n/a     Vaccines:  N/a - coming off B-cell therapy     Start Form:   Pending patient and provider signature    [place]:   Ocrevus due 6/20 - start Mavenclad then     Contraception:  N/a    Drug interactions:   No interactions with current list of medications and Mavenclad

## 2025-05-27 ENCOUNTER — RESULTS FOLLOW-UP (OUTPATIENT)
Dept: NEUROLOGY | Facility: CLINIC | Age: 55
End: 2025-05-27

## 2025-05-27 ENCOUNTER — HOSPITAL ENCOUNTER (OUTPATIENT)
Dept: RADIOLOGY | Facility: HOSPITAL | Age: 55
Discharge: HOME | End: 2025-05-27
Attending: CLINICAL NURSE SPECIALIST
Payer: COMMERCIAL

## 2025-05-27 DIAGNOSIS — F17.290 CIGAR SMOKER: ICD-10-CM

## 2025-05-27 DIAGNOSIS — R79.89 ELEVATED LFTS: Primary | ICD-10-CM

## 2025-05-27 LAB
CD3 ABSOLUTE FLOW (OHS): 722 CELLS/UL (ref 700–2100)
CD3 PERCENT (OHS): 69.18 % (ref 55–83)
CD3+CD4+ CELLS # SPEC: 511 CELLS/UL (ref 300–1400)
CD3+CD4+ CELLS NFR BLD: 48.92 % (ref 28–57)
CD3+CD8+ CELLS NFR SPEC: 18.86 % (ref 10–39)
CD4/CD8 RATIO FLOW (OHS): 2.59 (ref 0.9–3.6)
CD8 ABSOLUTE FLOW (OHS): 197 CELLS/UL (ref 200–900)
LABORATORY COMMENT REPORT: ABNORMAL
MITOGEN MINUS NIL (OHS): 4.49
NIL TB SYNCED (OHS): 0.02
QUANTIFERON GOLD INTERP (OHS): NEGATIVE
TB1 AG MINUS NIL (OHS): 0.01
TB2 AG MINUS NIL (OHS): 0.01
V.ZOSTER IGG INTERP (OHS): POSITIVE
VARICELLA ZOSTER IGG (OHS): 15.9 S/CO

## 2025-05-27 PROCEDURE — 71250 CT THORAX DX C-: CPT | Mod: TC

## 2025-05-29 ENCOUNTER — TELEPHONE (OUTPATIENT)
Dept: NEUROLOGY | Facility: CLINIC | Age: 55
End: 2025-05-29
Payer: COMMERCIAL

## 2025-05-29 NOTE — TELEPHONE ENCOUNTER
Colonoscopy result reviewed from [facility]:   Small non-bleeding grade/stage 1 internal hemorrhoids were noted. Repeat colonoscopy in 10 years unless change in family history of colon cancer or GI symptoms.

## 2025-05-30 ENCOUNTER — RESULTS FOLLOW-UP (OUTPATIENT)
Dept: NEUROLOGY | Facility: CLINIC | Age: 55
End: 2025-05-30

## 2025-05-30 ENCOUNTER — LAB VISIT (OUTPATIENT)
Dept: LAB | Facility: HOSPITAL | Age: 55
End: 2025-05-30
Payer: COMMERCIAL

## 2025-05-30 DIAGNOSIS — D84.9 IMMUNOSUPPRESSION: ICD-10-CM

## 2025-05-30 DIAGNOSIS — G35 MULTIPLE SCLEROSIS: Primary | ICD-10-CM

## 2025-05-30 DIAGNOSIS — R79.89 ELEVATED LFTS: ICD-10-CM

## 2025-05-30 DIAGNOSIS — G35 MULTIPLE SCLEROSIS: ICD-10-CM

## 2025-05-30 DIAGNOSIS — Z79.899 HIGH RISK MEDICATION USE: ICD-10-CM

## 2025-05-30 LAB
ABSOLUTE EOSINOPHIL (OHS): 0.17 K/UL
ABSOLUTE MONOCYTE (OHS): 0.45 K/UL (ref 0.3–1)
ABSOLUTE NEUTROPHIL COUNT (OHS): 1.56 K/UL (ref 1.8–7.7)
ALBUMIN SERPL BCP-MCNC: 4.2 G/DL (ref 3.5–5.2)
ALP SERPL-CCNC: 57 UNIT/L (ref 40–150)
ALT SERPL W/O P-5'-P-CCNC: 160 UNIT/L (ref 10–44)
AST SERPL-CCNC: 85 UNIT/L (ref 11–45)
BASOPHILS # BLD AUTO: 0.05 K/UL
BASOPHILS NFR BLD AUTO: 1.6 %
BILIRUB DIRECT SERPL-MCNC: 0.2 MG/DL (ref 0.1–0.3)
BILIRUB SERPL-MCNC: 0.5 MG/DL (ref 0.1–1)
ERYTHROCYTE [DISTWIDTH] IN BLOOD BY AUTOMATED COUNT: 12 % (ref 11.5–14.5)
HCT VFR BLD AUTO: 45.3 % (ref 40–54)
HGB BLD-MCNC: 14.4 GM/DL (ref 14–18)
IMM GRANULOCYTES # BLD AUTO: 0.02 K/UL (ref 0–0.04)
IMM GRANULOCYTES NFR BLD AUTO: 0.6 % (ref 0–0.5)
LYMPHOCYTES # BLD AUTO: 0.89 K/UL (ref 1–4.8)
MCH RBC QN AUTO: 28.6 PG (ref 27–31)
MCHC RBC AUTO-ENTMCNC: 31.8 G/DL (ref 32–36)
MCV RBC AUTO: 90 FL (ref 82–98)
NUCLEATED RBC (/100WBC) (OHS): 0 /100 WBC
PLATELET # BLD AUTO: 278 K/UL (ref 150–450)
PMV BLD AUTO: 9.9 FL (ref 9.2–12.9)
PROT SERPL-MCNC: 6.7 GM/DL (ref 6–8.4)
RBC # BLD AUTO: 5.04 M/UL (ref 4.6–6.2)
RELATIVE EOSINOPHIL (OHS): 5.4 %
RELATIVE LYMPHOCYTE (OHS): 28.3 % (ref 18–48)
RELATIVE MONOCYTE (OHS): 14.3 % (ref 4–15)
RELATIVE NEUTROPHIL (OHS): 49.8 % (ref 38–73)
WBC # BLD AUTO: 3.14 K/UL (ref 3.9–12.7)

## 2025-05-30 PROCEDURE — 84460 ALANINE AMINO (ALT) (SGPT): CPT

## 2025-05-30 PROCEDURE — 36415 COLL VENOUS BLD VENIPUNCTURE: CPT

## 2025-05-30 PROCEDURE — 85025 COMPLETE CBC W/AUTO DIFF WBC: CPT

## 2025-05-30 PROCEDURE — 86711 JOHN CUNNINGHAM ANTIBODY: CPT

## 2025-05-30 PROCEDURE — 86682 HELMINTH ANTIBODY: CPT

## 2025-06-03 LAB
INDEX VALUE: 0.81
JCV ANTIBODY: POSITIVE
STRONGYLOIDES IGG SER QL IA: NEGATIVE

## 2025-06-04 ENCOUNTER — TELEPHONE (OUTPATIENT)
Dept: NEUROLOGY | Facility: CLINIC | Age: 55
End: 2025-06-04
Payer: COMMERCIAL

## 2025-06-13 ENCOUNTER — LAB VISIT (OUTPATIENT)
Dept: LAB | Facility: HOSPITAL | Age: 55
End: 2025-06-13
Payer: COMMERCIAL

## 2025-06-13 DIAGNOSIS — G35 MULTIPLE SCLEROSIS: ICD-10-CM

## 2025-06-13 DIAGNOSIS — R79.89 ELEVATED LFTS: ICD-10-CM

## 2025-06-13 LAB
ABSOLUTE EOSINOPHIL (OHS): 0.16 K/UL
ABSOLUTE MONOCYTE (OHS): 0.43 K/UL (ref 0.3–1)
ABSOLUTE NEUTROPHIL COUNT (OHS): 1.55 K/UL (ref 1.8–7.7)
ALBUMIN SERPL BCP-MCNC: 4.1 G/DL (ref 3.5–5.2)
ALP SERPL-CCNC: 61 UNIT/L (ref 40–150)
ALT SERPL W/O P-5'-P-CCNC: 124 UNIT/L (ref 10–44)
AST SERPL-CCNC: 56 UNIT/L (ref 11–45)
BASOPHILS # BLD AUTO: 0.05 K/UL
BASOPHILS NFR BLD AUTO: 1.5 %
BILIRUB DIRECT SERPL-MCNC: 0.2 MG/DL (ref 0.1–0.3)
BILIRUB SERPL-MCNC: 0.4 MG/DL (ref 0.1–1)
ERYTHROCYTE [DISTWIDTH] IN BLOOD BY AUTOMATED COUNT: 11.9 % (ref 11.5–14.5)
HCT VFR BLD AUTO: 44.4 % (ref 40–54)
HGB BLD-MCNC: 14.4 GM/DL (ref 14–18)
IMM GRANULOCYTES # BLD AUTO: 0.01 K/UL (ref 0–0.04)
IMM GRANULOCYTES NFR BLD AUTO: 0.3 % (ref 0–0.5)
LYMPHOCYTES # BLD AUTO: 1.03 K/UL (ref 1–4.8)
MCH RBC QN AUTO: 29.6 PG (ref 27–31)
MCHC RBC AUTO-ENTMCNC: 32.4 G/DL (ref 32–36)
MCV RBC AUTO: 91 FL (ref 82–98)
NUCLEATED RBC (/100WBC) (OHS): 0 /100 WBC
PLATELET # BLD AUTO: 281 K/UL (ref 150–450)
PMV BLD AUTO: 10 FL (ref 9.2–12.9)
PROT SERPL-MCNC: 6.8 GM/DL (ref 6–8.4)
RBC # BLD AUTO: 4.87 M/UL (ref 4.6–6.2)
RELATIVE EOSINOPHIL (OHS): 5 %
RELATIVE LYMPHOCYTE (OHS): 31.9 % (ref 18–48)
RELATIVE MONOCYTE (OHS): 13.3 % (ref 4–15)
RELATIVE NEUTROPHIL (OHS): 48 % (ref 38–73)
WBC # BLD AUTO: 3.23 K/UL (ref 3.9–12.7)

## 2025-06-13 PROCEDURE — 36415 COLL VENOUS BLD VENIPUNCTURE: CPT

## 2025-06-13 PROCEDURE — 85025 COMPLETE CBC W/AUTO DIFF WBC: CPT

## 2025-06-13 PROCEDURE — 84075 ASSAY ALKALINE PHOSPHATASE: CPT

## 2025-06-19 ENCOUNTER — PATIENT MESSAGE (OUTPATIENT)
Dept: PSYCHIATRY | Facility: CLINIC | Age: 55
End: 2025-06-19
Payer: COMMERCIAL

## 2025-06-20 ENCOUNTER — TELEPHONE (OUTPATIENT)
Dept: NEUROLOGY | Facility: CLINIC | Age: 55
End: 2025-06-20
Payer: COMMERCIAL

## 2025-06-23 ENCOUNTER — TELEPHONE (OUTPATIENT)
Dept: NEUROLOGY | Facility: CLINIC | Age: 55
End: 2025-06-23
Payer: COMMERCIAL

## 2025-06-25 ENCOUNTER — TELEPHONE (OUTPATIENT)
Dept: NEUROLOGY | Facility: CLINIC | Age: 55
End: 2025-06-25
Payer: COMMERCIAL

## 2025-07-07 ENCOUNTER — LAB VISIT (OUTPATIENT)
Dept: LAB | Facility: HOSPITAL | Age: 55
End: 2025-07-07
Payer: COMMERCIAL

## 2025-07-07 DIAGNOSIS — G35 MULTIPLE SCLEROSIS: ICD-10-CM

## 2025-07-07 LAB
ABSOLUTE EOSINOPHIL (OHS): 0.11 K/UL
ABSOLUTE MONOCYTE (OHS): 0.38 K/UL (ref 0.3–1)
ABSOLUTE NEUTROPHIL COUNT (OHS): 1.89 K/UL (ref 1.8–7.7)
ALBUMIN SERPL BCP-MCNC: 4.4 G/DL (ref 3.5–5.2)
ALP SERPL-CCNC: 74 UNIT/L (ref 40–150)
ALT SERPL W/O P-5'-P-CCNC: 68 UNIT/L (ref 10–44)
AST SERPL-CCNC: 31 UNIT/L (ref 11–45)
BASOPHILS # BLD AUTO: 0.05 K/UL
BASOPHILS NFR BLD AUTO: 1.4 %
BILIRUB DIRECT SERPL-MCNC: 0.3 MG/DL (ref 0.1–0.3)
BILIRUB SERPL-MCNC: 0.7 MG/DL (ref 0.1–1)
ERYTHROCYTE [DISTWIDTH] IN BLOOD BY AUTOMATED COUNT: 12.1 % (ref 11.5–14.5)
HCT VFR BLD AUTO: 46.1 % (ref 40–54)
HGB BLD-MCNC: 14.9 GM/DL (ref 14–18)
IMM GRANULOCYTES # BLD AUTO: 0.01 K/UL (ref 0–0.04)
IMM GRANULOCYTES NFR BLD AUTO: 0.3 % (ref 0–0.5)
LYMPHOCYTES # BLD AUTO: 1.16 K/UL (ref 1–4.8)
MCH RBC QN AUTO: 29.2 PG (ref 27–31)
MCHC RBC AUTO-ENTMCNC: 32.3 G/DL (ref 32–36)
MCV RBC AUTO: 90 FL (ref 82–98)
NUCLEATED RBC (/100WBC) (OHS): 0 /100 WBC
PLATELET # BLD AUTO: 293 K/UL (ref 150–450)
PMV BLD AUTO: 9.5 FL (ref 9.2–12.9)
PROT SERPL-MCNC: 7 GM/DL (ref 6–8.4)
RBC # BLD AUTO: 5.11 M/UL (ref 4.6–6.2)
RELATIVE EOSINOPHIL (OHS): 3.1 %
RELATIVE LYMPHOCYTE (OHS): 32.2 % (ref 18–48)
RELATIVE MONOCYTE (OHS): 10.6 % (ref 4–15)
RELATIVE NEUTROPHIL (OHS): 52.4 % (ref 38–73)
WBC # BLD AUTO: 3.6 K/UL (ref 3.9–12.7)

## 2025-07-07 PROCEDURE — 36415 COLL VENOUS BLD VENIPUNCTURE: CPT

## 2025-07-07 PROCEDURE — 84460 ALANINE AMINO (ALT) (SGPT): CPT

## 2025-07-07 PROCEDURE — 85025 COMPLETE CBC W/AUTO DIFF WBC: CPT

## 2025-07-10 ENCOUNTER — PATIENT MESSAGE (OUTPATIENT)
Dept: NEUROLOGY | Facility: CLINIC | Age: 55
End: 2025-07-10
Payer: COMMERCIAL

## 2025-07-10 DIAGNOSIS — R79.89 ABNORMAL LFTS: Primary | ICD-10-CM

## 2025-07-14 ENCOUNTER — TELEPHONE (OUTPATIENT)
Dept: NEUROLOGY | Facility: CLINIC | Age: 55
End: 2025-07-14
Payer: COMMERCIAL

## 2025-07-17 ENCOUNTER — LAB VISIT (OUTPATIENT)
Dept: LAB | Facility: HOSPITAL | Age: 55
End: 2025-07-17
Payer: COMMERCIAL

## 2025-07-17 DIAGNOSIS — R79.89 ABNORMAL LFTS: ICD-10-CM

## 2025-07-17 LAB
ALBUMIN SERPL BCP-MCNC: 4.1 G/DL (ref 3.5–5.2)
ALP SERPL-CCNC: 66 UNIT/L (ref 40–150)
ALT SERPL W/O P-5'-P-CCNC: 63 UNIT/L (ref 10–44)
AST SERPL-CCNC: 33 UNIT/L (ref 11–45)
BILIRUB DIRECT SERPL-MCNC: 0.2 MG/DL (ref 0.1–0.3)
BILIRUB SERPL-MCNC: 0.5 MG/DL (ref 0.1–1)
PROT SERPL-MCNC: 6.8 GM/DL (ref 6–8.4)

## 2025-07-17 PROCEDURE — 36415 COLL VENOUS BLD VENIPUNCTURE: CPT

## 2025-07-17 PROCEDURE — 84075 ASSAY ALKALINE PHOSPHATASE: CPT

## 2025-07-17 PROCEDURE — 88185 FLOWCYTOMETRY/TC ADD-ON: CPT

## 2025-07-18 ENCOUNTER — RESULTS FOLLOW-UP (OUTPATIENT)
Dept: NEUROLOGY | Facility: CLINIC | Age: 55
End: 2025-07-18
Payer: COMMERCIAL

## 2025-07-19 LAB
M CEE20 RESULT: NORMAL
PATH REPORT.FINAL DX SPEC: NORMAL

## 2025-07-30 ENCOUNTER — PATIENT MESSAGE (OUTPATIENT)
Dept: PSYCHIATRY | Facility: CLINIC | Age: 55
End: 2025-07-30
Payer: COMMERCIAL

## 2025-08-01 ENCOUNTER — PATIENT MESSAGE (OUTPATIENT)
Dept: PSYCHIATRY | Facility: CLINIC | Age: 55
End: 2025-08-01
Payer: COMMERCIAL

## 2025-08-02 ENCOUNTER — LAB VISIT (OUTPATIENT)
Dept: LAB | Facility: HOSPITAL | Age: 55
End: 2025-08-02
Payer: COMMERCIAL

## 2025-08-02 DIAGNOSIS — G35 MULTIPLE SCLEROSIS: ICD-10-CM

## 2025-08-02 DIAGNOSIS — D84.9 IMMUNOSUPPRESSION: ICD-10-CM

## 2025-08-02 DIAGNOSIS — Z79.899 HIGH RISK MEDICATION USE: ICD-10-CM

## 2025-08-02 LAB
ALBUMIN SERPL BCP-MCNC: 4.3 G/DL (ref 3.5–5.2)
ALP SERPL-CCNC: 74 UNIT/L (ref 40–150)
ALT SERPL W/O P-5'-P-CCNC: 43 UNIT/L (ref 0–55)
AST SERPL-CCNC: 31 UNIT/L (ref 0–50)
BILIRUB DIRECT SERPL-MCNC: 0.2 MG/DL (ref 0.1–0.3)
BILIRUB SERPL-MCNC: 0.5 MG/DL (ref 0.1–1)
PROT SERPL-MCNC: 7 GM/DL (ref 6–8.4)

## 2025-08-02 PROCEDURE — 80076 HEPATIC FUNCTION PANEL: CPT

## 2025-08-02 PROCEDURE — 36415 COLL VENOUS BLD VENIPUNCTURE: CPT

## 2025-08-05 ENCOUNTER — TELEPHONE (OUTPATIENT)
Dept: NEUROLOGY | Facility: CLINIC | Age: 55
End: 2025-08-05
Payer: COMMERCIAL

## 2025-08-05 NOTE — TELEPHONE ENCOUNTER
Copied from CRM #[id number]. Topic: Medications - Medication Authorization  >> Aug 5, 2025 11:49 AM Amanda wrote:  Type:  Medication Auth    Who Called: Debo/[employer]   Refill or New Rx:New   RX Name and Strength:cladribine,multiple sclerosis, (MAVENCLAD, 7 TABLET PACK,) 10 mg Tab   [[id number]]      Ordering Provider:Dr. Clark    Would the patient rather a call back or a response via [facility]? Callback     Best Call Back Number:  [phone]  >> Aug 5, 2025  2:10 PM ATA Gonzalez wrote:    ----- Message -----  From: Amanda Veras  Sent: 8/5/2025  11:52 AM CDT  To: Amber Sin

## 2025-08-09 ENCOUNTER — PATIENT MESSAGE (OUTPATIENT)
Dept: NEUROLOGY | Facility: CLINIC | Age: 55
End: 2025-08-09
Payer: COMMERCIAL

## 2025-08-09 DIAGNOSIS — D84.9 IMMUNOSUPPRESSION: Primary | ICD-10-CM

## 2025-08-09 DIAGNOSIS — G35 MULTIPLE SCLEROSIS: ICD-10-CM

## 2025-08-23 ENCOUNTER — LAB VISIT (OUTPATIENT)
Dept: LAB | Facility: HOSPITAL | Age: 55
End: 2025-08-23
Payer: COMMERCIAL

## 2025-08-23 DIAGNOSIS — G35 MULTIPLE SCLEROSIS: ICD-10-CM

## 2025-08-23 DIAGNOSIS — D84.9 IMMUNOSUPPRESSION: ICD-10-CM

## 2025-08-23 LAB
ALBUMIN SERPL BCP-MCNC: 4.2 G/DL (ref 3.5–5.2)
ALP SERPL-CCNC: 69 UNIT/L (ref 40–150)
ALT SERPL W/O P-5'-P-CCNC: 57 UNIT/L (ref 0–55)
AST SERPL-CCNC: 36 UNIT/L (ref 0–50)
BILIRUB DIRECT SERPL-MCNC: 0.2 MG/DL (ref 0.1–0.3)
BILIRUB SERPL-MCNC: 0.5 MG/DL (ref 0.1–1)
PROT SERPL-MCNC: 6.9 GM/DL (ref 6–8.4)

## 2025-08-23 PROCEDURE — 36415 COLL VENOUS BLD VENIPUNCTURE: CPT

## 2025-08-23 PROCEDURE — 80076 HEPATIC FUNCTION PANEL: CPT

## 2025-08-24 ENCOUNTER — PATIENT MESSAGE (OUTPATIENT)
Dept: ADMINISTRATIVE | Facility: OTHER | Age: 55
End: 2025-08-24
Payer: COMMERCIAL

## 2025-09-04 ENCOUNTER — TELEPHONE (OUTPATIENT)
Dept: NEUROLOGY | Facility: CLINIC | Age: 55
End: 2025-09-04
Payer: COMMERCIAL

## 2025-09-04 DIAGNOSIS — D84.9 IMMUNOSUPPRESSION: Primary | ICD-10-CM

## 2025-09-04 DIAGNOSIS — R79.89 ABNORMAL LFTS: ICD-10-CM

## 2025-09-04 DIAGNOSIS — G35 MULTIPLE SCLEROSIS: ICD-10-CM

## 2025-09-04 DIAGNOSIS — R79.89 ELEVATED LFTS: ICD-10-CM
